# Patient Record
Sex: MALE | Race: BLACK OR AFRICAN AMERICAN | NOT HISPANIC OR LATINO | Employment: FULL TIME | URBAN - METROPOLITAN AREA
[De-identification: names, ages, dates, MRNs, and addresses within clinical notes are randomized per-mention and may not be internally consistent; named-entity substitution may affect disease eponyms.]

---

## 2017-02-23 DIAGNOSIS — B00.9 HERPES: ICD-10-CM

## 2017-02-24 RX ORDER — VALACYCLOVIR HYDROCHLORIDE 500 MG/1
500 TABLET, FILM COATED ORAL DAILY
Qty: 90 TABLET | Refills: 2 | Status: SHIPPED | OUTPATIENT
Start: 2017-02-24 | End: 2018-01-25 | Stop reason: SDUPTHER

## 2017-02-24 RX ORDER — OMEPRAZOLE 20 MG/1
20 CAPSULE, DELAYED RELEASE ORAL NIGHTLY
Qty: 90 CAPSULE | Refills: 0 | Status: SHIPPED | OUTPATIENT
Start: 2017-02-24 | End: 2017-07-10 | Stop reason: SDUPTHER

## 2017-02-24 RX ORDER — MELOXICAM 15 MG/1
15 TABLET ORAL DAILY
Qty: 90 TABLET | Refills: 0 | Status: SHIPPED | OUTPATIENT
Start: 2017-02-24 | End: 2017-07-10 | Stop reason: SDUPTHER

## 2017-03-28 ENCOUNTER — OFFICE VISIT (OUTPATIENT)
Dept: INTERNAL MEDICINE | Facility: CLINIC | Age: 62
End: 2017-03-28

## 2017-03-28 VITALS
BODY MASS INDEX: 26.6 KG/M2 | WEIGHT: 190 LBS | HEART RATE: 85 BPM | DIASTOLIC BLOOD PRESSURE: 90 MMHG | OXYGEN SATURATION: 97 % | SYSTOLIC BLOOD PRESSURE: 128 MMHG | HEIGHT: 71 IN

## 2017-03-28 DIAGNOSIS — Z12.5 PROSTATE CANCER SCREENING: ICD-10-CM

## 2017-03-28 DIAGNOSIS — I10 ESSENTIAL HYPERTENSION: ICD-10-CM

## 2017-03-28 DIAGNOSIS — M19.90 ARTHRITIS: ICD-10-CM

## 2017-03-28 DIAGNOSIS — E08.00 DIABETES MELLITUS DUE TO UNDERLYING CONDITION WITH HYPEROSMOLARITY WITHOUT COMA, WITHOUT LONG-TERM CURRENT USE OF INSULIN (HCC): ICD-10-CM

## 2017-03-28 DIAGNOSIS — Z00.00 ANNUAL PHYSICAL EXAM: Primary | ICD-10-CM

## 2017-03-28 LAB
ALBUMIN SERPL-MCNC: 4.13 G/DL (ref 3.4–4.6)
ALBUMIN UR-MCNC: 20.3 MG/L (ref 1.3–20)
ALBUMIN/GLOB SERPL: 1.3 G/DL
ALP SERPL-CCNC: 57 U/L (ref 46–116)
ALT SERPL W P-5'-P-CCNC: 29 U/L (ref 16–63)
ANION GAP SERPL CALCULATED.3IONS-SCNC: 11 MMOL/L
AST SERPL-CCNC: 27 U/L (ref 7–37)
BASOPHILS # BLD AUTO: 0.03 10*3/MM3 (ref 0–0.2)
BASOPHILS NFR BLD AUTO: 0.5 % (ref 0–1.5)
BILIRUB SERPL-MCNC: 0.7 MG/DL (ref 0.2–1)
BUN BLD-MCNC: 25 MG/DL (ref 6–22)
BUN/CREAT SERPL: 28.4 (ref 7–25)
CALCIUM SPEC-SCNC: 8.5 MG/DL (ref 8.6–10.5)
CHLORIDE SERPL-SCNC: 106 MMOL/L (ref 95–107)
CHOLEST SERPL-MCNC: 170 MG/DL (ref 0–200)
CO2 SERPL-SCNC: 25 MMOL/L (ref 23–32)
CREAT BLD-MCNC: 0.88 MG/DL (ref 0.7–1.3)
CREAT UR-MCNC: 267.1 MG/DL (ref 20–320)
EOSINOPHIL # BLD AUTO: 0.07 10*3/MM3 (ref 0–0.7)
EOSINOPHIL # BLD AUTO: 1.1 % (ref 0.3–6.2)
ERYTHROCYTE [DISTWIDTH] IN BLOOD BY AUTOMATED COUNT: 14 % (ref 11.5–14.5)
GFR SERPL CREATININE-BSD FRML MDRD: 106 ML/MIN/1.73
GLOBULIN UR ELPH-MCNC: 3.3 GM/DL
GLUCOSE BLD-MCNC: 104 MG/DL (ref 70–100)
HBA1C MFR BLD: 5.84 % (ref 4.8–5.6)
HCT VFR BLD AUTO: 42.7 % (ref 40.4–52.2)
HDLC SERPL-MCNC: 56 MG/DL (ref 40–81)
HGB BLD-MCNC: 14.2 G/DL (ref 13.7–17.6)
IMM GRANULOCYTES # BLD: 0 10*3/MM3 (ref 0–0.03)
IMM GRANULOCYTES NFR BLD: 0 % (ref 0–0.5)
LDLC SERPL CALC-MCNC: 107 MG/DL (ref 0–100)
LDLC/HDLC SERPL: 1.92 {RATIO}
LYMPHOCYTES # BLD AUTO: 1.53 10*3/MM3 (ref 0.9–4.8)
LYMPHOCYTES NFR BLD AUTO: 24.1 % (ref 19.6–45.3)
MCH RBC QN AUTO: 30.9 PG (ref 27–32.7)
MCHC RBC AUTO-ENTMCNC: 33.3 G/DL (ref 32.6–36.4)
MCV RBC AUTO: 93 FL (ref 79.8–96.2)
MICROALBUMIN/CREAT UR: 7.6 MG/L (ref 1.3–30)
MONOCYTES # BLD AUTO: 0.42 10*3/MM3 (ref 0.2–1.2)
MONOCYTES NFR BLD AUTO: 6.6 % (ref 5–12)
NEUTROPHILS # BLD AUTO: 4.3 10*3/MM3 (ref 1.9–8.1)
NEUTROPHILS NFR BLD AUTO: 67.7 % (ref 42.7–76)
PLATELET # BLD AUTO: 214 10*3/MM3 (ref 140–500)
POTASSIUM BLD-SCNC: 4.4 MMOL/L (ref 3.3–5.3)
PROT SERPL-MCNC: 7.4 G/DL (ref 6.3–8.4)
PSA SERPL-MCNC: 0.74 NG/ML (ref 0.13–4)
RBC # BLD AUTO: 4.59 10*6/MM3 (ref 4.6–6)
SODIUM BLD-SCNC: 142 MMOL/L (ref 136–145)
TRIGL SERPL-MCNC: 33 MG/DL (ref 0–150)
VLDLC SERPL-MCNC: 6.6 MG/DL
WBC # BLD AUTO: 6.35 10*3/MM3 (ref 4.5–10.7)

## 2017-03-28 PROCEDURE — 82043 UR ALBUMIN QUANTITATIVE: CPT | Performed by: INTERNAL MEDICINE

## 2017-03-28 PROCEDURE — 80053 COMPREHEN METABOLIC PANEL: CPT | Performed by: INTERNAL MEDICINE

## 2017-03-28 PROCEDURE — 99396 PREV VISIT EST AGE 40-64: CPT | Performed by: INTERNAL MEDICINE

## 2017-03-28 PROCEDURE — 84153 ASSAY OF PSA TOTAL: CPT | Performed by: INTERNAL MEDICINE

## 2017-03-28 PROCEDURE — 80061 LIPID PANEL: CPT | Performed by: INTERNAL MEDICINE

## 2017-03-28 PROCEDURE — 82570 ASSAY OF URINE CREATININE: CPT | Performed by: INTERNAL MEDICINE

## 2017-03-28 NOTE — PROGRESS NOTES
Subjective   Bob Biggs is a 62 y.o. male.     HPI Comments: Here for Annual Physical       The following portions of the patient's history were reviewed and updated as appropriate: allergies, current medications, past family history, past medical history, past social history, past surgical history and problem list.    Review of Systems   Constitutional:        Doing well Has [employer] program @ work  Has chandan dieting exercising & haslost 20 lbs   HENT: Negative.    Eyes: Positive for visual disturbance (Wears glasses).        Diabetic eye exam was good   Respiratory: Negative.    Cardiovascular: Negative for chest pain and palpitations.   Gastrointestinal: Negative.    Endocrine:        Diabetes has been doing well   Genitourinary: Negative.    Musculoskeletal: Positive for joint swelling (Has Rt knee replacement Dr Apoorva HARTLEY knee giving him some trouble).   Neurological: Negative.    Hematological: Negative.        Objective   Physical Exam   Constitutional: He is oriented to person, place, and time. He appears well-developed and well-nourished.   HENT:   Head: Normocephalic.   Right Ear: External ear normal.   Left Ear: External ear normal.   Nose: Nose normal.   Mouth/Throat: Oropharynx is clear and moist.   Eyes: Conjunctivae and EOM are normal. Pupils are equal, round, and reactive to light.   Neck: Normal range of motion. Neck supple.   Cardiovascular: Normal rate, regular rhythm, normal heart sounds and intact distal pulses.    Repeat 140/90   Pulmonary/Chest: Effort normal and breath sounds normal.   Abdominal: Soft. Bowel sounds are normal.   Genitourinary: Rectum normal, prostate normal and penis normal.   Musculoskeletal: Normal range of motion.   Neurological: He is alert and oriented to person, place, and time. He has normal reflexes.   Skin: Skin is warm and dry.   Psychiatric: He has a normal mood and affect. His behavior is normal. Thought content normal.   Vitals reviewed.        Assessment/Plan    Bob was seen today for annual exam.    Diagnoses and all orders for this visit:    Annual physical exam    Diabetes mellitus due to underlying condition with hyperosmolarity without coma, without long-term current use of insulin  -     Hemoglobin A1c; Future  -     Microalbumin / Creatinine Urine Ratio; Future  -     Lipid Panel; Future  -     Hemoglobin A1c  -     Microalbumin / Creatinine Urine Ratio  -     Lipid Panel    Essential hypertension  -     CBC Auto Differential; Future  -     Comprehensive Metabolic Panel; Future  -     XR Chest 2 View  -     CBC Auto Differential  -     Comprehensive Metabolic Panel    Arthritis    Prostate cancer screening  -     PSA; Future  -     PSA    Other orders  -     Cancel: ECG 12 Lead    Procedures

## 2017-07-10 RX ORDER — MELOXICAM 15 MG/1
TABLET ORAL
Qty: 90 TABLET | Refills: 1 | Status: SHIPPED | OUTPATIENT
Start: 2017-07-10 | End: 2018-04-05

## 2017-07-10 RX ORDER — OMEPRAZOLE 20 MG/1
CAPSULE, DELAYED RELEASE ORAL
Qty: 90 CAPSULE | Refills: 1 | Status: SHIPPED | OUTPATIENT
Start: 2017-07-10 | End: 2017-09-21 | Stop reason: CLARIF

## 2017-09-12 DIAGNOSIS — R21 RASH: Primary | ICD-10-CM

## 2017-09-12 RX ORDER — BETAMETHASONE DIPROPIONATE 0.5 MG/G
CREAM TOPICAL 2 TIMES DAILY
Qty: 15 G | Refills: 0 | Status: SHIPPED | OUTPATIENT
Start: 2017-09-12 | End: 2018-12-28 | Stop reason: SDUPTHER

## 2017-09-12 NOTE — TELEPHONE ENCOUNTER
"Patient called complaining of a buttock rash x \"few weeks.\"   States he gets this every once in a while, and has just ran out of his cream.  He is requesting a script for  Betamethasone 0.05% cream, 15 g    Last ov as physical 03/28/17  This cream not on his med list and never E RX by you ..  Please approve if OK with you.  Patient:  [phone]    "

## 2017-09-21 ENCOUNTER — OFFICE VISIT (OUTPATIENT)
Dept: INTERNAL MEDICINE | Facility: CLINIC | Age: 62
End: 2017-09-21

## 2017-09-21 VITALS
HEART RATE: 78 BPM | WEIGHT: 204 LBS | BODY MASS INDEX: 28.56 KG/M2 | OXYGEN SATURATION: 98 % | DIASTOLIC BLOOD PRESSURE: 108 MMHG | HEIGHT: 71 IN | SYSTOLIC BLOOD PRESSURE: 160 MMHG

## 2017-09-21 DIAGNOSIS — R10.84 GENERALIZED ABDOMINAL PAIN: Primary | ICD-10-CM

## 2017-09-21 LAB
ALBUMIN SERPL-MCNC: 4.4 G/DL (ref 3.5–5.2)
ALBUMIN/GLOB SERPL: 1.8 G/DL
ALP SERPL-CCNC: 53 U/L (ref 39–117)
ALT SERPL-CCNC: 21 U/L (ref 1–41)
AMYLASE SERPL-CCNC: 73 U/L (ref 28–100)
AST SERPL-CCNC: 20 U/L (ref 1–40)
BASOPHILS # BLD AUTO: 0.04 10*3/MM3 (ref 0–0.2)
BASOPHILS NFR BLD AUTO: 0.5 % (ref 0–1.5)
BILIRUB SERPL-MCNC: 0.4 MG/DL (ref 0.1–1.2)
BUN SERPL-MCNC: 18 MG/DL (ref 8–23)
BUN/CREAT SERPL: 21.4 (ref 7–25)
CALCIUM SERPL-MCNC: 9.2 MG/DL (ref 8.6–10.5)
CHLORIDE SERPL-SCNC: 104 MMOL/L (ref 98–107)
CO2 SERPL-SCNC: 26.5 MMOL/L (ref 22–29)
CREAT SERPL-MCNC: 0.84 MG/DL (ref 0.76–1.27)
EOSINOPHIL # BLD AUTO: 0.31 10*3/MM3 (ref 0–0.7)
EOSINOPHIL # BLD AUTO: 4.2 % (ref 0.3–6.2)
ERYTHROCYTE [DISTWIDTH] IN BLOOD BY AUTOMATED COUNT: 14 % (ref 11.5–14.5)
GLOBULIN SER CALC-MCNC: 2.5 GM/DL
GLUCOSE SERPL-MCNC: 117 MG/DL (ref 65–99)
HCT VFR BLD AUTO: 41.3 % (ref 40.4–52.2)
HGB BLD-MCNC: 13.6 G/DL (ref 13.7–17.6)
IMM GRANULOCYTES # BLD: 0.02 10*3/MM3 (ref 0–0.03)
IMM GRANULOCYTES NFR BLD: 0.3 % (ref 0–0.5)
LIPASE SERPL-CCNC: 27 U/L (ref 13–60)
LYMPHOCYTES # BLD AUTO: 2.58 10*3/MM3 (ref 0.9–4.8)
LYMPHOCYTES NFR BLD AUTO: 35 % (ref 19.6–45.3)
MCH RBC QN AUTO: 30.7 PG (ref 27–32.7)
MCHC RBC AUTO-ENTMCNC: 32.9 G/DL (ref 32.6–36.4)
MCV RBC AUTO: 93.2 FL (ref 79.8–96.2)
MONOCYTES # BLD AUTO: 0.5 10*3/MM3 (ref 0.2–1.2)
MONOCYTES NFR BLD AUTO: 6.8 % (ref 5–12)
NEUTROPHILS # BLD AUTO: 3.93 10*3/MM3 (ref 1.9–8.1)
NEUTROPHILS NFR BLD AUTO: 53.2 % (ref 42.7–76)
PLATELET # BLD AUTO: 208 10*3/MM3 (ref 140–500)
POTASSIUM SERPL-SCNC: 4.2 MMOL/L (ref 3.5–5.2)
PROT SERPL-MCNC: 6.9 G/DL (ref 6–8.5)
RBC # BLD AUTO: 4.43 10*6/MM3 (ref 4.6–6)
SODIUM SERPL-SCNC: 143 MMOL/L (ref 136–145)
WBC # BLD AUTO: 7.38 10*3/MM3 (ref 4.5–10.7)

## 2017-09-21 PROCEDURE — 99214 OFFICE O/P EST MOD 30 MIN: CPT | Performed by: INTERNAL MEDICINE

## 2017-09-21 RX ORDER — PANTOPRAZOLE SODIUM 40 MG/1
40 TABLET, DELAYED RELEASE ORAL 2 TIMES DAILY
Qty: 60 TABLET | Refills: 3 | Status: SHIPPED | OUTPATIENT
Start: 2017-09-21 | End: 2017-11-13 | Stop reason: SDUPTHER

## 2017-09-21 NOTE — PROGRESS NOTES
Subjective   Bob Biggs is a 62 y.o. male.     Abdominal Cramping   This is a new problem. The current episode started 1 to 4 weeks ago.        The following portions of the patient's history were reviewed and updated as appropriate: allergies, current medications, past family history, past medical history, past social history, past surgical history and problem list.    Review of Systems   Constitutional: Unexpected weight change: Not feeling well.   HENT: Negative.    Eyes: Negative.    Respiratory: Negative.    Cardiovascular: Negative.    Gastrointestinal: Positive for abdominal pain (Has been having upper abdominal pain for past 1o days Having a lot of heartburn & reflux JHas alot of gas & belching BM<s OK  No nausea or vomiting  Pain is more generalized).   Genitourinary: Negative.    Musculoskeletal: Negative.    Neurological: Negative.        Objective   Physical Exam   Constitutional: He appears well-developed.   HENT:   Head: Normocephalic.   Eyes: EOM are normal.   Neck: Neck supple.   Cardiovascular: Normal rate, regular rhythm and normal heart sounds.    Repeat 150/90   Pulmonary/Chest: Effort normal and breath sounds normal.   Abdominal: Soft. Bowel sounds are normal. He exhibits no distension. There is tenderness (Mild diffuse tenderness W/O guarding).   Musculoskeletal: Normal range of motion.   Neurological: He is alert.   Vitals reviewed.      Assessment/Plan   Bob was seen today for abdominal cramping.    Diagnoses and all orders for this visit:    Generalized abdominal pain  -     CT Abdomen Pelvis With Contrast; Future  -     CBC Auto Differential; Future  -     Comprehensive Metabolic Panel; Future  -     Amylase; Future  -     Lipase; Future  -     pantoprazole (PROTONIX) 40 MG EC tablet; Take 1 tablet by mouth 2 (Two) Times a Day.  -     CBC Auto Differential  -     Comprehensive Metabolic Panel  -     Amylase  -     Lipase

## 2017-09-22 ENCOUNTER — TELEPHONE (OUTPATIENT)
Dept: INTERNAL MEDICINE | Facility: CLINIC | Age: 62
End: 2017-09-22

## 2017-09-22 NOTE — TELEPHONE ENCOUNTER
Pt calling for a doctors note to excuse him from work 9/22-9/24 with return date 9/25 due to abd pain.    Pt needs this faxed by noon today and wants called when done with a copy left at front for him.     Fax# [phone] [employer]    Pt#[phone]

## 2017-09-28 ENCOUNTER — TELEPHONE (OUTPATIENT)
Dept: INTERNAL MEDICINE | Facility: CLINIC | Age: 62
End: 2017-09-28

## 2017-09-28 DIAGNOSIS — K21.9 GASTROESOPHAGEAL REFLUX DISEASE, ESOPHAGITIS PRESENCE NOT SPECIFIED: Primary | ICD-10-CM

## 2017-10-02 ENCOUNTER — TELEPHONE (OUTPATIENT)
Dept: INTERNAL MEDICINE | Facility: CLINIC | Age: 62
End: 2017-10-02

## 2017-10-02 NOTE — TELEPHONE ENCOUNTER
Please see if he can schedule him with the first available GI in that group and let me know thanks

## 2017-10-02 NOTE — TELEPHONE ENCOUNTER
----- Message from Agustina Mayfield MA sent at 10/2/2017  9:51 AM EDT -----  Pt calling and says he is still having severe reflux and stomach pains.  He called Dr Branch office but can not be seen in consultation until November.  Pt would like to know if our office can get an appt sooner or even with another Gastro.  Pt has missed work and is concerned about his employment        Pt#[phone]

## 2017-10-09 ENCOUNTER — OFFICE (AMBULATORY)
Dept: URBAN - METROPOLITAN AREA CLINIC 75 | Facility: CLINIC | Age: 62
End: 2017-10-09

## 2017-10-09 VITALS
HEIGHT: 70 IN | SYSTOLIC BLOOD PRESSURE: 130 MMHG | WEIGHT: 199 LBS | DIASTOLIC BLOOD PRESSURE: 70 MMHG | HEART RATE: 81 BPM

## 2017-10-09 DIAGNOSIS — R10.13 EPIGASTRIC PAIN: ICD-10-CM

## 2017-10-09 DIAGNOSIS — R13.10 DYSPHAGIA, UNSPECIFIED: ICD-10-CM

## 2017-10-09 DIAGNOSIS — K21.9 GASTRO-ESOPHAGEAL REFLUX DISEASE WITHOUT ESOPHAGITIS: ICD-10-CM

## 2017-10-09 PROCEDURE — 99204 OFFICE O/P NEW MOD 45 MIN: CPT | Performed by: INTERNAL MEDICINE

## 2017-10-10 ENCOUNTER — TELEPHONE (OUTPATIENT)
Dept: INTERNAL MEDICINE | Facility: CLINIC | Age: 62
End: 2017-10-10

## 2017-10-10 NOTE — TELEPHONE ENCOUNTER
----- Message from Isis Mendoza sent at 10/10/2017  9:13 AM EDT -----  Contact: Patient  Patent called regarding FMLA paperwork.  He saw Dr. Mckay Mason yesterday with scope scheduled for Tuesday 10/17/2017.  Needs FMLA papers ASAP; they need to be filled out for 6 weeks with [id number] date of 11/03/2017.  Please call patient if any questions.  Please advise.  Thanks.      Patient:  [phone]

## 2017-10-10 NOTE — TELEPHONE ENCOUNTER
Called pt, informed him that we completed and faxed his FMLA .  would like to f/u with him on 10/31 , made him yeimi for that date at 2:00pm and will discuss his health situation further .

## 2017-10-16 VITALS
HEART RATE: 94 BPM | TEMPERATURE: 97.3 F | OXYGEN SATURATION: 99 % | DIASTOLIC BLOOD PRESSURE: 79 MMHG | HEART RATE: 90 BPM | RESPIRATION RATE: 18 BRPM | SYSTOLIC BLOOD PRESSURE: 128 MMHG | OXYGEN SATURATION: 98 % | TEMPERATURE: 97.2 F | DIASTOLIC BLOOD PRESSURE: 90 MMHG | OXYGEN SATURATION: 96 % | OXYGEN SATURATION: 95 % | SYSTOLIC BLOOD PRESSURE: 151 MMHG | DIASTOLIC BLOOD PRESSURE: 86 MMHG | SYSTOLIC BLOOD PRESSURE: 136 MMHG | WEIGHT: 199 LBS | HEIGHT: 70 IN | SYSTOLIC BLOOD PRESSURE: 131 MMHG | DIASTOLIC BLOOD PRESSURE: 77 MMHG | DIASTOLIC BLOOD PRESSURE: 95 MMHG | RESPIRATION RATE: 20 BRPM | HEART RATE: 81 BPM | OXYGEN SATURATION: 91 % | HEART RATE: 77 BPM | DIASTOLIC BLOOD PRESSURE: 97 MMHG | RESPIRATION RATE: 23 BRPM | OXYGEN SATURATION: 87 % | SYSTOLIC BLOOD PRESSURE: 156 MMHG | HEART RATE: 88 BPM | DIASTOLIC BLOOD PRESSURE: 82 MMHG | OXYGEN SATURATION: 97 % | SYSTOLIC BLOOD PRESSURE: 141 MMHG | HEART RATE: 91 BPM

## 2017-10-17 ENCOUNTER — OFFICE (AMBULATORY)
Dept: URBAN - METROPOLITAN AREA CLINIC 64 | Facility: CLINIC | Age: 62
End: 2017-10-17

## 2017-10-17 ENCOUNTER — AMBULATORY SURGICAL CENTER (AMBULATORY)
Dept: URBAN - METROPOLITAN AREA SURGERY 17 | Facility: SURGERY | Age: 62
End: 2017-10-17

## 2017-10-17 DIAGNOSIS — R10.84 GENERALIZED ABDOMINAL PAIN: ICD-10-CM

## 2017-10-17 DIAGNOSIS — R13.10 DYSPHAGIA, UNSPECIFIED: ICD-10-CM

## 2017-10-17 DIAGNOSIS — K21.9 GASTRO-ESOPHAGEAL REFLUX DISEASE WITHOUT ESOPHAGITIS: ICD-10-CM

## 2017-10-17 DIAGNOSIS — B96.81 HELICOBACTER PYLORI [H. PYLORI] AS THE CAUSE OF DISEASES CLA: ICD-10-CM

## 2017-10-17 DIAGNOSIS — K21.0 GASTRO-ESOPHAGEAL REFLUX DISEASE WITH ESOPHAGITIS: ICD-10-CM

## 2017-10-17 DIAGNOSIS — K29.70 GASTRITIS, UNSPECIFIED, WITHOUT BLEEDING: ICD-10-CM

## 2017-10-17 DIAGNOSIS — K29.50 UNSPECIFIED CHRONIC GASTRITIS WITHOUT BLEEDING: ICD-10-CM

## 2017-10-17 DIAGNOSIS — R10.13 EPIGASTRIC PAIN: ICD-10-CM

## 2017-10-17 LAB
GI HISTOLOGY: A. UNSPECIFIED: (no result)
GI HISTOLOGY: B. UNSPECIFIED: (no result)
GI HISTOLOGY: C. UNSPECIFIED: (no result)
GI HISTOLOGY: D. SELECT: (no result)
GI HISTOLOGY: E. SELECT: (no result)
GI HISTOLOGY: PDF REPORT: (no result)

## 2017-10-17 PROCEDURE — 43239 EGD BIOPSY SINGLE/MULTIPLE: CPT | Performed by: INTERNAL MEDICINE

## 2017-10-17 PROCEDURE — 88305 TISSUE EXAM BY PATHOLOGIST: CPT | Performed by: INTERNAL MEDICINE

## 2017-10-17 PROCEDURE — 43450 DILATE ESOPHAGUS 1/MULT PASS: CPT | Performed by: INTERNAL MEDICINE

## 2017-10-17 RX ADMIN — PROPOFOL 100 MG: 10 INJECTION, EMULSION INTRAVENOUS at 15:20

## 2017-10-17 RX ADMIN — PROPOFOL 25 MG: 10 INJECTION, EMULSION INTRAVENOUS at 15:26

## 2017-10-17 RX ADMIN — PROPOFOL 25 MG: 10 INJECTION, EMULSION INTRAVENOUS at 15:27

## 2017-10-17 RX ADMIN — LIDOCAINE HYDROCHLORIDE 25 MG: 10 INJECTION, SOLUTION EPIDURAL; INFILTRATION; INTRACAUDAL; PERINEURAL at 15:20

## 2017-10-17 RX ADMIN — PROPOFOL 25 MG: 10 INJECTION, EMULSION INTRAVENOUS at 15:23

## 2017-10-17 RX ADMIN — PROPOFOL 50 MG: 10 INJECTION, EMULSION INTRAVENOUS at 15:21

## 2017-10-31 ENCOUNTER — OFFICE VISIT (OUTPATIENT)
Dept: INTERNAL MEDICINE | Facility: CLINIC | Age: 62
End: 2017-10-31

## 2017-10-31 VITALS
HEIGHT: 71 IN | BODY MASS INDEX: 28.56 KG/M2 | WEIGHT: 204 LBS | SYSTOLIC BLOOD PRESSURE: 150 MMHG | DIASTOLIC BLOOD PRESSURE: 98 MMHG

## 2017-10-31 DIAGNOSIS — J06.9 UPPER RESPIRATORY TRACT INFECTION, UNSPECIFIED TYPE: ICD-10-CM

## 2017-10-31 DIAGNOSIS — K29.70 HELICOBACTER PYLORI GASTRITIS: Primary | ICD-10-CM

## 2017-10-31 DIAGNOSIS — I10 ESSENTIAL HYPERTENSION: ICD-10-CM

## 2017-10-31 DIAGNOSIS — B96.81 HELICOBACTER PYLORI GASTRITIS: Primary | ICD-10-CM

## 2017-10-31 PROCEDURE — 99214 OFFICE O/P EST MOD 30 MIN: CPT | Performed by: INTERNAL MEDICINE

## 2017-10-31 RX ORDER — CLARITHROMYCIN 500 MG/1
TABLET, COATED ORAL
Refills: 0 | COMMUNITY
Start: 2017-10-19 | End: 2018-04-05

## 2017-10-31 RX ORDER — METRONIDAZOLE 500 MG/1
TABLET ORAL
Refills: 0 | COMMUNITY
Start: 2017-10-19 | End: 2018-04-05

## 2017-10-31 NOTE — PROGRESS NOTES
Subjective   Bob Biggs is a 62 y.o. male.     Abdominal Pain   This is a recurrent problem.        The following portions of the patient's history were reviewed and updated as appropriate: allergies, current medications, past family history, past medical history, past social history, past surgical history and problem list.    Review of Systems   Constitutional:        Here for F/U   HENT: Positive for rhinorrhea (Having URI SX ).    Respiratory: Positive for cough.    Gastrointestinal: Abdominal pain:  found to have H Pylori gastritis  Finishing RX  Abdominal pain is better.   Genitourinary: Negative.    Musculoskeletal: Negative.        Objective   Physical Exam   Constitutional: He appears well-developed.   HENT:   Head: Normocephalic.   Eyes: EOM are normal.   Cardiovascular: Normal rate, regular rhythm and normal heart sounds.    Repeat 150/100   Pulmonary/Chest: Effort normal and breath sounds normal.   Musculoskeletal: Normal range of motion.       Assessment/Plan   Bob was seen today for abdominal pain.    Diagnoses and all orders for this visit:    Helicobacter pylori gastritis    Essential hypertension    Upper respiratory tract infection, unspecified type    May return to work Friday    Advised OTC RXs for URI

## 2017-11-02 ENCOUNTER — TELEPHONE (OUTPATIENT)
Dept: INTERNAL MEDICINE | Facility: CLINIC | Age: 62
End: 2017-11-02

## 2017-11-02 NOTE — TELEPHONE ENCOUNTER
----- Message from Agustina Mayfield MA sent at 11/2/2017  9:08 AM EDT -----      Pt called and needs a release to work note with no restrictions effect 11/3.  Pt asks to pls fax it to employer-he did not have number but thinks we should have it

## 2017-11-13 ENCOUNTER — TELEPHONE (OUTPATIENT)
Dept: INTERNAL MEDICINE | Facility: CLINIC | Age: 62
End: 2017-11-13

## 2017-11-13 DIAGNOSIS — R10.84 GENERALIZED ABDOMINAL PAIN: ICD-10-CM

## 2017-11-13 RX ORDER — PANTOPRAZOLE SODIUM 40 MG/1
40 TABLET, DELAYED RELEASE ORAL 2 TIMES DAILY
Qty: 180 TABLET | Refills: 3 | Status: SHIPPED | OUTPATIENT
Start: 2017-11-13 | End: 2017-11-20 | Stop reason: SDUPTHER

## 2017-11-13 NOTE — TELEPHONE ENCOUNTER
----- Message from Ashli Brantley sent at 11/13/2017  1:57 PM EST -----  Contact: pt - Dr Mitchell's pt - RE: Rx to mail order pharmacy  Pt calling and would like a refill on Rx to be sent to pt's mail order pharmacy. Could you please send to pt's pharmacy? Please advise. Thanks      pantoprazole (PROTONIX) 40 MG EC tablet 60 tablet 3       Sig - Route: Take 1 tablet by mouth 2 (Two) Times a Day. - Oral    [facility] - [facility] [address] - [phone]  - [phone] FX    Pt # [phone]

## 2017-11-20 ENCOUNTER — TELEPHONE (OUTPATIENT)
Dept: INTERNAL MEDICINE | Facility: CLINIC | Age: 62
End: 2017-11-20

## 2017-11-20 DIAGNOSIS — R10.84 GENERALIZED ABDOMINAL PAIN: ICD-10-CM

## 2017-11-20 RX ORDER — PANTOPRAZOLE SODIUM 40 MG/1
40 TABLET, DELAYED RELEASE ORAL 2 TIMES DAILY
Qty: 14 TABLET | Refills: 0 | Status: SHIPPED | OUTPATIENT
Start: 2017-11-20 | End: 2018-09-20

## 2017-11-20 NOTE — TELEPHONE ENCOUNTER
----- Message from Isis Mendoza sent at 11/20/2017 12:30 PM EST -----  Contact: Patient   Patient called regarding his Rx for pantoprazole.  Mail order is being processed but his medication will not be delivered for a week.  He is requesting a week's supply of this be called into local pharmacy.  Please advise.      pantoprazole (PROTONIX) 40 MG EC tablet    Patient:  [phone]    Pharmacy:  [facility] [address] - [phone] [facility] [phone] FX

## 2017-11-28 ENCOUNTER — TELEPHONE (OUTPATIENT)
Dept: INTERNAL MEDICINE | Facility: CLINIC | Age: 62
End: 2017-11-28

## 2017-11-29 ENCOUNTER — CLINICAL SUPPORT (OUTPATIENT)
Dept: INTERNAL MEDICINE | Facility: CLINIC | Age: 62
End: 2017-11-29

## 2017-11-29 DIAGNOSIS — Z23 NEED FOR VACCINATION: Primary | ICD-10-CM

## 2017-11-29 PROCEDURE — 90686 IIV4 VACC NO PRSV 0.5 ML IM: CPT | Performed by: INTERNAL MEDICINE

## 2017-11-29 PROCEDURE — G0009 ADMIN PNEUMOCOCCAL VACCINE: HCPCS | Performed by: INTERNAL MEDICINE

## 2017-11-29 PROCEDURE — 90670 PCV13 VACCINE IM: CPT | Performed by: INTERNAL MEDICINE

## 2017-11-29 PROCEDURE — G0008 ADMIN INFLUENZA VIRUS VAC: HCPCS | Performed by: INTERNAL MEDICINE

## 2018-01-25 DIAGNOSIS — E08.00 DIABETES MELLITUS DUE TO UNDERLYING CONDITION WITH HYPEROSMOLARITY WITHOUT COMA, WITHOUT LONG-TERM CURRENT USE OF INSULIN (HCC): ICD-10-CM

## 2018-01-25 DIAGNOSIS — B00.9 HERPES: ICD-10-CM

## 2018-01-26 RX ORDER — METFORMIN HYDROCHLORIDE 500 MG/1
TABLET, EXTENDED RELEASE ORAL
Qty: 90 TABLET | Refills: 0 | Status: SHIPPED | OUTPATIENT
Start: 2018-01-26 | End: 2018-04-05 | Stop reason: SDUPTHER

## 2018-01-26 RX ORDER — VALACYCLOVIR HYDROCHLORIDE 500 MG/1
TABLET, FILM COATED ORAL
Qty: 90 TABLET | Refills: 2 | Status: SHIPPED | OUTPATIENT
Start: 2018-01-26 | End: 2018-11-07

## 2018-02-06 ENCOUNTER — TELEPHONE (OUTPATIENT)
Dept: INTERNAL MEDICINE | Facility: CLINIC | Age: 63
End: 2018-02-06

## 2018-02-15 DIAGNOSIS — Z96.651 HX OF TOTAL KNEE ARTHROPLASTY, RIGHT: Primary | ICD-10-CM

## 2018-02-15 RX ORDER — CEPHALEXIN 500 MG/1
CAPSULE ORAL
Qty: 4 CAPSULE | Refills: 0 | Status: SHIPPED | OUTPATIENT
Start: 2018-02-15 | End: 2018-04-05

## 2018-02-15 NOTE — TELEPHONE ENCOUNTER
PATIENT HAVING DENTAL PROCEDURE-02/20/2018    SX 03/03/2016 TKA-RIGHT   [facility] ANTIBIOTIC PROTOCOL-2 YEARS AFTER SURGERY     [facility] OUT OF OFFICE.

## 2018-02-25 DIAGNOSIS — R10.84 GENERALIZED ABDOMINAL PAIN: ICD-10-CM

## 2018-02-26 DIAGNOSIS — R10.84 GENERALIZED ABDOMINAL PAIN: ICD-10-CM

## 2018-02-26 RX ORDER — PANTOPRAZOLE SODIUM 40 MG/1
TABLET, DELAYED RELEASE ORAL
Qty: 60 TABLET | Refills: 0 | Status: SHIPPED | OUTPATIENT
Start: 2018-02-26 | End: 2018-02-26 | Stop reason: SDUPTHER

## 2018-02-26 RX ORDER — PANTOPRAZOLE SODIUM 40 MG/1
TABLET, DELAYED RELEASE ORAL
Qty: 180 TABLET | Refills: 0 | Status: SHIPPED | OUTPATIENT
Start: 2018-02-26 | End: 2018-04-05 | Stop reason: SDUPTHER

## 2018-03-29 ENCOUNTER — TELEPHONE (OUTPATIENT)
Dept: ORTHOPEDIC SURGERY | Facility: CLINIC | Age: 63
End: 2018-03-29

## 2018-04-05 ENCOUNTER — OFFICE VISIT (OUTPATIENT)
Dept: INTERNAL MEDICINE | Facility: CLINIC | Age: 63
End: 2018-04-05

## 2018-04-05 VITALS
DIASTOLIC BLOOD PRESSURE: 100 MMHG | OXYGEN SATURATION: 99 % | BODY MASS INDEX: 28.84 KG/M2 | HEART RATE: 83 BPM | HEIGHT: 71 IN | WEIGHT: 206 LBS | SYSTOLIC BLOOD PRESSURE: 150 MMHG

## 2018-04-05 DIAGNOSIS — I10 ESSENTIAL HYPERTENSION: ICD-10-CM

## 2018-04-05 DIAGNOSIS — K21.00 GASTROESOPHAGEAL REFLUX DISEASE WITH ESOPHAGITIS: ICD-10-CM

## 2018-04-05 DIAGNOSIS — E08.00 DIABETES MELLITUS DUE TO UNDERLYING CONDITION WITH HYPEROSMOLARITY WITHOUT COMA, WITHOUT LONG-TERM CURRENT USE OF INSULIN (HCC): ICD-10-CM

## 2018-04-05 DIAGNOSIS — Z00.00 ANNUAL PHYSICAL EXAM: Primary | ICD-10-CM

## 2018-04-05 DIAGNOSIS — Z12.5 PROSTATE CANCER SCREENING: ICD-10-CM

## 2018-04-05 DIAGNOSIS — E11.9 DIABETES MELLITUS WITHOUT COMPLICATION (HCC): ICD-10-CM

## 2018-04-05 DIAGNOSIS — M19.90 ARTHRITIS: ICD-10-CM

## 2018-04-05 LAB
ALBUMIN SERPL-MCNC: 4.2 G/DL (ref 3.5–5.2)
ALBUMIN UR-MCNC: <1.2 MG/L
ALBUMIN/GLOB SERPL: 1.4 G/DL
ALP SERPL-CCNC: 58 U/L (ref 39–117)
ALT SERPL W P-5'-P-CCNC: 25 U/L (ref 1–41)
ANION GAP SERPL CALCULATED.3IONS-SCNC: 11.9 MMOL/L
AST SERPL-CCNC: 22 U/L (ref 1–40)
BASOPHILS # BLD AUTO: 0.02 10*3/MM3 (ref 0–0.2)
BASOPHILS NFR BLD AUTO: 0.3 % (ref 0–2)
BILIRUB SERPL-MCNC: 0.5 MG/DL (ref 0.1–1.2)
BUN BLD-MCNC: 14 MG/DL (ref 8–23)
BUN/CREAT SERPL: 16.1 (ref 7–25)
CALCIUM SPEC-SCNC: 9.4 MG/DL (ref 8.6–10.5)
CHLORIDE SERPL-SCNC: 101 MMOL/L (ref 98–107)
CHOLEST SERPL-MCNC: 183 MG/DL (ref 0–200)
CO2 SERPL-SCNC: 26.1 MMOL/L (ref 22–29)
CREAT BLD-MCNC: 0.87 MG/DL (ref 0.76–1.27)
CREAT UR-MCNC: 206.8 MG/DL
DEPRECATED RDW RBC AUTO: 45.6 FL (ref 37–54)
EOSINOPHIL # BLD AUTO: 0.22 10*3/MM3 (ref 0–0.7)
EOSINOPHIL NFR BLD AUTO: 3.2 % (ref 0–5)
ERYTHROCYTE [DISTWIDTH] IN BLOOD BY AUTOMATED COUNT: 13.7 % (ref 11.5–15)
GFR SERPL CREATININE-BSD FRML MDRD: 107 ML/MIN/1.73
GLOBULIN UR ELPH-MCNC: 3.1 GM/DL
GLUCOSE BLD-MCNC: 184 MG/DL (ref 65–99)
HBA1C MFR BLD: 7.92 % (ref 4.8–5.6)
HCT VFR BLD AUTO: 41.3 % (ref 40.1–51)
HDLC SERPL-MCNC: 37 MG/DL (ref 40–60)
HGB BLD-MCNC: 13.7 G/DL (ref 13.7–17.5)
LDLC SERPL CALC-MCNC: 126 MG/DL (ref 0–100)
LDLC/HDLC SERPL: 3.41 {RATIO}
LYMPHOCYTES # BLD AUTO: 2.06 10*3/MM3 (ref 0.8–7)
LYMPHOCYTES NFR BLD AUTO: 29.7 % (ref 10–60)
MCH RBC QN AUTO: 30.9 PG (ref 26–34)
MCHC RBC AUTO-ENTMCNC: 33.2 G/DL (ref 31–37)
MCV RBC AUTO: 93 FL (ref 80–100)
MICROALBUMIN/CREAT UR: NORMAL MG/G
MONOCYTES # BLD AUTO: 0.53 10*3/MM3 (ref 0–1)
MONOCYTES NFR BLD AUTO: 7.6 % (ref 0–13)
NEUTROPHILS # BLD AUTO: 4.1 10*3/MM3 (ref 1–11)
NEUTROPHILS NFR BLD AUTO: 59.2 % (ref 30–85)
PLATELET # BLD AUTO: 189 10*3/MM3 (ref 150–450)
PMV BLD AUTO: 11.5 FL (ref 6–12)
POTASSIUM BLD-SCNC: 4.2 MMOL/L (ref 3.5–5.2)
PROT SERPL-MCNC: 7.3 G/DL (ref 6–8.5)
PSA SERPL-MCNC: 1.05 NG/ML (ref 0–4)
RBC # BLD AUTO: 4.44 10*6/MM3 (ref 4.63–6.08)
SODIUM BLD-SCNC: 139 MMOL/L (ref 136–145)
TRIGL SERPL-MCNC: 100 MG/DL (ref 0–150)
VLDLC SERPL-MCNC: 20 MG/DL (ref 5–40)
WBC NRBC COR # BLD: 6.93 10*3/MM3 (ref 5–10)

## 2018-04-05 PROCEDURE — 80053 COMPREHEN METABOLIC PANEL: CPT | Performed by: INTERNAL MEDICINE

## 2018-04-05 PROCEDURE — 85025 COMPLETE CBC W/AUTO DIFF WBC: CPT | Performed by: INTERNAL MEDICINE

## 2018-04-05 PROCEDURE — 80061 LIPID PANEL: CPT | Performed by: INTERNAL MEDICINE

## 2018-04-05 PROCEDURE — 82570 ASSAY OF URINE CREATININE: CPT | Performed by: INTERNAL MEDICINE

## 2018-04-05 PROCEDURE — 36415 COLL VENOUS BLD VENIPUNCTURE: CPT | Performed by: INTERNAL MEDICINE

## 2018-04-05 PROCEDURE — 93000 ELECTROCARDIOGRAM COMPLETE: CPT | Performed by: INTERNAL MEDICINE

## 2018-04-05 PROCEDURE — 82043 UR ALBUMIN QUANTITATIVE: CPT | Performed by: INTERNAL MEDICINE

## 2018-04-05 PROCEDURE — G0103 PSA SCREENING: HCPCS | Performed by: INTERNAL MEDICINE

## 2018-04-05 PROCEDURE — 99396 PREV VISIT EST AGE 40-64: CPT | Performed by: INTERNAL MEDICINE

## 2018-04-05 PROCEDURE — 83036 HEMOGLOBIN GLYCOSYLATED A1C: CPT | Performed by: INTERNAL MEDICINE

## 2018-04-05 RX ORDER — METFORMIN HYDROCHLORIDE 500 MG/1
500 TABLET, EXTENDED RELEASE ORAL
Qty: 90 TABLET | Refills: 3 | Status: SHIPPED | OUTPATIENT
Start: 2018-04-05 | End: 2019-05-08 | Stop reason: SDUPTHER

## 2018-04-05 NOTE — PROGRESS NOTES
Subjective   Bob Biggs is a 63 y.o. male.     Diabetes   He presents for his follow-up diabetic visit. He has type 2 diabetes mellitus. Pertinent negatives for diabetes include no chest pain.        The following portions of the patient's history were reviewed and updated as appropriate: allergies, current medications, past family history, past medical history, past social history, past surgical history and problem list.    Review of Systems   Constitutional:        Has been doing well No new problems Daibetes has been doing well   HENT: Negative.    Eyes: Positive for visual disturbance (Wears glasses ).        No diabetic issues    Respiratory: Negative.    Cardiovascular: Negative.  Negative for chest pain and palpitations.   Gastrointestinal: Positive for abdominal pain (No further bowell problems).   Genitourinary: Negative.    Musculoskeletal: Positive for arthralgias (Usual aches & Pains L knee need replacement).   Neurological: Negative.    Psychiatric/Behavioral: Negative.        Objective   Physical Exam   Constitutional: He is oriented to person, place, and time. He appears well-developed and well-nourished.   HENT:   Head: Normocephalic.   Right Ear: External ear normal.   Left Ear: External ear normal.   Nose: Nose normal.   Mouth/Throat: Oropharynx is clear and moist.   Eyes: Conjunctivae and EOM are normal. Pupils are equal, round, and reactive to light.   Neck: Normal range of motion. Neck supple.   Cardiovascular: Normal rate, regular rhythm, normal heart sounds and intact distal pulses.    Repeat 140/90   Pulmonary/Chest: Effort normal and breath sounds normal.   Abdominal: Soft. Bowel sounds are normal.   Genitourinary: Rectum normal, prostate normal and penis normal.   Musculoskeletal: Normal range of motion.   Rt knee replacement scar   Lymphadenopathy:     He has no cervical adenopathy.   Neurological: He is alert and oriented to person, place, and time. He has normal reflexes.   Skin:  Skin is warm and dry.   Psychiatric: He has a normal mood and affect. His behavior is normal.   Vitals reviewed.      Assessment/Plan   Bob was seen today for annual exam.    Diagnoses and all orders for this visit:    Annual physical exam    Diabetes mellitus without complication  -     Comprehensive Metabolic Panel; Future  -     Lipid Panel; Future  -     Hemoglobin A1c; Future  -     Microalbumin / Creatinine Urine Ratio - Urine, Clean Catch; Future    Essential hypertension  -     CBC Auto Differential; Future  -     Cancel: XR Chest 2 View; Future  -     ECG 12 Lead    Arthritis    Gastroesophageal reflux disease with esophagitis    Prostate cancer screening  -     PSA SCREENING; Future    Diabetes mellitus due to underlying condition with hyperosmolarity without coma, without long-term current use of insulin  -     metFORMIN ER (GLUCOPHAGE-XR) 500 MG 24 hr tablet; Take 1 tablet by mouth Daily With Breakfast.      ECG 12 Lead  Date/Time: 4/5/2018 9:27 AM  Performed by: ALVARO ZEPEDA  Authorized by: ALVARO ZEPEDA   Rhythm: sinus rhythm  Rate: normal  Conduction: conduction normal  ST Segments: ST segments normal  T depression: V4, V5 and V6  Clinical impression: non-specific ECG  Comments: No change from prior tracing

## 2018-07-02 DIAGNOSIS — R10.84 GENERALIZED ABDOMINAL PAIN: ICD-10-CM

## 2018-07-02 RX ORDER — PANTOPRAZOLE SODIUM 40 MG/1
TABLET, DELAYED RELEASE ORAL
Qty: 180 TABLET | Refills: 0 | Status: SHIPPED | OUTPATIENT
Start: 2018-07-02 | End: 2018-09-20

## 2018-07-11 ENCOUNTER — OFFICE VISIT (OUTPATIENT)
Dept: ORTHOPEDIC SURGERY | Facility: CLINIC | Age: 63
End: 2018-07-11

## 2018-07-11 VITALS — BODY MASS INDEX: 27.77 KG/M2 | HEIGHT: 70 IN | WEIGHT: 194 LBS

## 2018-07-11 DIAGNOSIS — M25.562 CHRONIC PAIN OF LEFT KNEE: Primary | ICD-10-CM

## 2018-07-11 DIAGNOSIS — G89.29 CHRONIC PAIN OF LEFT KNEE: Primary | ICD-10-CM

## 2018-07-11 DIAGNOSIS — M17.10 ARTHRITIS OF KNEE: ICD-10-CM

## 2018-07-11 PROCEDURE — 99214 OFFICE O/P EST MOD 30 MIN: CPT | Performed by: ORTHOPAEDIC SURGERY

## 2018-07-11 PROCEDURE — 73562 X-RAY EXAM OF KNEE 3: CPT | Performed by: ORTHOPAEDIC SURGERY

## 2018-07-11 RX ORDER — PREGABALIN 75 MG/1
150 CAPSULE ORAL ONCE
Status: CANCELLED | OUTPATIENT
Start: 2018-10-04 | End: 2018-10-04

## 2018-07-11 RX ORDER — CEFAZOLIN SODIUM 2 G/100ML
2 INJECTION, SOLUTION INTRAVENOUS ONCE
Status: CANCELLED | OUTPATIENT
Start: 2018-10-04 | End: 2018-10-04

## 2018-07-11 RX ORDER — MELOXICAM 15 MG/1
15 TABLET ORAL ONCE
Status: CANCELLED | OUTPATIENT
Start: 2018-10-04 | End: 2018-10-04

## 2018-07-12 NOTE — PROGRESS NOTES
Patient: Bob Biggs    YOB: 1955    Medical Record Number: [id number]    Chief Complaints:  Worsening left knee pain    History of Present Illness:     63 y.o. male patient who presents for follow-up of his left knee.  He reports that his symptoms seem to be getting progressively worse.  He cannot walk more than 100-200 feet without having to rest.  He describes severe constant aching and/or throbbing pain associated with grinding, clicking, and popping.  The last injection did not help significantly.  He comes in today wanting to discuss the option of an arthroplasty.    Allergies:   Allergies   Allergen Reactions   • No Known Drug Allergy        Home Medications:    Current Outpatient Prescriptions:   •  Acetaminophen (ACETAMIN PO), Take 500-1,000 mg by mouth as needed (pain)., Disp: , Rfl:   •  betamethasone dipropionate (DIPROLENE) 0.05 % cream, Apply  topically 2 (Two) Times a Day., Disp: 15 g, Rfl: 0  •  metFORMIN ER (GLUCOPHAGE-XR) 500 MG 24 hr tablet, Take 1 tablet by mouth Daily With Breakfast., Disp: 90 tablet, Rfl: 3  •  Multiple Vitamins-Minerals (CENTRUM SILVER ADULT 50+) tablet, Take  by mouth daily., Disp: , Rfl:   •  ONE TOUCH LANCETS misc, 1 each 2 (Two) Times a Day., Disp: 200 each, Rfl: 1  •  pantoprazole (PROTONIX) 40 MG EC tablet, Take 1 tablet by mouth 2 (Two) Times a Day., Disp: 14 tablet, Rfl: 0  •  pantoprazole (PROTONIX) 40 MG EC tablet, TAKE 1 TABLET BY MOUTH TWICE DAILY, Disp: 180 tablet, Rfl: 0  •  valACYclovir (VALTREX) 500 MG tablet, TAKE 1 TABLET EVERY DAY, Disp: 90 tablet, Rfl: 2  •  Glucos-MSM-C-Fn-Pjbuxi-Sypelh (GLUCOSAMINE MSM COMPLEX) tablet, Take 1,500 mg by mouth every night., Disp: , Rfl:   •  glucose blood test strip, Patient tests two times a day, Disp: 100 each, Rfl: 12    Past Medical History:   Diagnosis Date   • Arthritis    • Diabetes mellitus (CMS/[facility])    • GERD (gastroesophageal reflux disease)    • Hypertension    • Painful swelling of joint    •  "Tear of meniscus of knee        Past Surgical History:   Procedure Laterality Date   • COLONOSCOPY  2008   • HYDROCELECTOMY      late 1990s   • KNEE ARTHROSCOPY Left    • KNEE SURGERY  03/03/2016    Right TKA       Social History     Occupational History   • Not on file.     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   • Sexual activity: Defer      Social History     Social History Narrative   • No narrative on file       Family History   Problem Relation Age of Onset   • Rheumatologic disease Mother    • Heart disease Father    • Cancer Sister    • Diabetes Brother    • Cancer Brother    • No Known Problems Daughter    • No Known Problems Son    • No Known Problems Other    • No Known Problems Daughter        Review of Systems:      Constitutional: Denies fever, shaking or chills   Eyes: Denies change in visual acuity   HEENT: Denies nasal congestion or sore throat   Respiratory: Denies cough or shortness of breath   Cardiovascular: Denies chest pain or edema  Endocrine: Denies tremors, palpitations, intolerance of heat or cold, polyuria, polydipsia.  GI: Denies abdominal pain, nausea, vomiting, bloody stools or diarrhea  : Denies frequency, urgency, incontinence, retention, or nocturia.  Musculoskeletal: Denies numbness tingling or loss of motor function   Integument: Denies rash, lesion or ulceration   Neurologic: Denies headache or focal weakness, deficits  Heme: Denies spontaneous or excessive bleeding, epistaxis, hematuria, melena, fatigue, enlarged or tender lymph nodes.      All other pertinent positives and negatives as noted above in HPI.    Physical Exam: 63 y.o. male  Vitals:    07/11/18 1314   Weight: 88 kg (194 lb)   Height: 177.8 cm (70\")     General:  Patient is awake and alert.  Appears in no acute distress or discomfort.    Psych:  Affect and demeanor are appropriate.    Eyes:  " Conjunctiva and sclera appear grossly normal.  Eyes track well and EOM seem to be intact.    Ears:  No gross abnormalities.  Hearing adequate for the exam.    Cardiovascular:  Regular rate and rhythm.    Lungs:  Good chest expansion.  Breathing unlabored.    Extremities: Left knee is examined.  Moderate medial and patellofemoral tenderness.  Moderate effusion.  Varus alignment.  There is pseudo-laxity of the MCL but the knee is otherwise stable.  Motion is from 3° hyperextension to 100° of flexion.  Good strength with hip flexion, knee extension, ankle and toe plantarflexion dorsal flex compared sensations intact.  Palpable pedal pulses.    Imaging:  Bilateral standing AP views and knees, bilateral merchants and a left knee lateral x-ray are ordered and reviewed.  These are compared to previous x-rays.  He has end stage left knee osteoarthritis with bone-on-bone degeneration, multiple intra-articular loose bodies, varus alignment and significant subchondral sclerosis.    Assessment/Plan:  Left knee osteoarthritis    We discussed options, both surgical and nonsurgical.  He had a good experience with his right knee and wants to pursue an arthroplasty for his left.  The risks, benefits, and alternatives were thoroughly discussed.  He consented.  He will follow-up for a preoperative visit.    Nato Guerin MD    07/11/2018

## 2018-07-19 PROBLEM — M25.562 CHRONIC PAIN OF LEFT KNEE: Status: ACTIVE | Noted: 2018-07-19

## 2018-07-19 PROBLEM — G89.29 CHRONIC PAIN OF LEFT KNEE: Status: ACTIVE | Noted: 2018-07-19

## 2018-09-04 ENCOUNTER — TREATMENT (OUTPATIENT)
Dept: PHYSICAL THERAPY | Facility: CLINIC | Age: 63
End: 2018-09-04

## 2018-09-04 DIAGNOSIS — G89.29 CHRONIC PAIN OF LEFT KNEE: Primary | ICD-10-CM

## 2018-09-04 DIAGNOSIS — M25.562 CHRONIC PAIN OF LEFT KNEE: Primary | ICD-10-CM

## 2018-09-04 PROCEDURE — 97161 PT EVAL LOW COMPLEX 20 MIN: CPT | Performed by: PHYSICAL THERAPIST

## 2018-09-04 PROCEDURE — 97110 THERAPEUTIC EXERCISES: CPT | Performed by: PHYSICAL THERAPIST

## 2018-09-04 PROCEDURE — 97530 THERAPEUTIC ACTIVITIES: CPT | Performed by: PHYSICAL THERAPIST

## 2018-09-04 NOTE — PATIENT INSTRUCTIONS
Educated about Dx and exam findings, rationale and POC. Gave handout on HEP with instructions.  Basic joint protection principles.

## 2018-09-04 NOTE — PROGRESS NOTES
Physical Therapy Initial Evaluation and Plan of Care        Subjective Evaluation    History of Present Illness  Onset date: 2018.  Mechanism of injury: Knee has been hurting for years with recent worsening last few months - Hx of 2 scopes L knee; R TKA 2016      Patient Occupation:  ([employer])   Precautions and Work Restrictions: nonePain  Current pain ratin  At best pain ratin  At worst pain rating: 10  Location: ant left knee  Quality: grinding, burning, dull ache, sharp and throbbing  Relieving factors: rest, medications and ice  Aggravating factors: standing, ambulation, stairs, squatting, sleeping and prolonged positioning  Progression: worsening    Social Support  Lives in: multiple-level home  Lives with: not alone.    Diagnostic Tests  X-ray: abnormal (end stage OA with multiple loose bodies)    Treatments  Previous treatment: injection treatment and medication  Current treatment: medication  Patient Goals  Patient goal: get knee ready for TKA Oct 4           Objective       Tenderness   Left Knee   Tenderness in the medial joint line.     Active Range of Motion   Left Knee   Flexion: 112 degrees     Right Knee   Flexion: 105 degrees   Extensor la degrees     Patellar Mobility   Left Knee Patellar tendons within functional limits include the medial and lateral. Hypomobile in the left superior and left inferior patellar tendon(s).     Strength/Myotome Testing     Left Hip   Planes of Motion   Flexion: 4-  Abduction: 4+  Adduction: 4+  External rotation: 4-  Internal rotation: 4-    Right Hip   Planes of Motion   Flexion: 5  Abduction: 5  Adduction: 5  External rotation: 5  Internal rotation: 4    Left Knee   Flexion: 4-  Extension: 4-    Right Knee   Flexion: 5  Extension: 5    Tests     Additional Tests Details  + Crepitus moderate; mod tight HS, gastroc, ITB    Ambulation     Ambulation: Level Surfaces     Additional Level Surfaces Ambulation Details  Mild antalgia L knee  with dec stance time         Assessment & Plan     Assessment  Impairments: abnormal gait, abnormal or restricted ROM, impaired physical strength and pain with function  Assessment details: 64 yo M with pain from advanced OA and plan of TKA in 1 month presents with limited and painful AROM, khris extension with notable mm tightness, notable weakness knee and hip mm, antalgic gait and limited kelly for normal ADLs  Prognosis: good  Prognosis details: For preparation for successful outcome of planned TKA  Functional Limitations: sleeping, walking, uncomfortable because of pain, sitting, standing and stooping  Goals  Plan Goals: STGs x 2 wks  1. Increasing ROM and strength for improved ADLs  2. Demos kelly for and compliance with PT and HEP  3. Review body mechanics and joint protection principles with ADLs  4. Dec pain with sleep and WB ADLs to < 8/10    LTGs x 4 wks  1. ROM and strength continue to improve  2. Independent with HEP for preparation of TKA  3. Decreased pain (< 6/10) and antalgia with WB ADLs  4.demos understanding of joint protection principles    Plan  Therapy options: will be seen for skilled physical therapy services  Planned modality interventions: cryotherapy  Planned therapy interventions: home exercise program, therapeutic activities, stretching, strengthening, functional ROM exercises, body mechanics training and balance/weight-bearing training  Duration in visits: 12  Treatment plan discussed with: patient        Manual Therapy:    0     mins  78851;  Therapeutic Exercise:    20     mins  67807;     Neuromuscular Cirilo:    0    mins  65435;    Therapeutic Activity:     10     mins  87228;       Timed Treatment:   30   mins   Total Treatment:     57   mins    PT SIGNATURE: Mayuri Mauricio PT   DATE TREATMENT INITIATED: 9/4/2018    Initial Certification  Certification Period: 12/3/2018  I certify that the therapy services are furnished while this patient is under my care.  The services outlined above  are required by this patient, and will be reviewed every 90 days.     PHYSICIAN: Nato Guerin MD      DATE:     Please sign and return via fax to [phone].. Thank you, [facility].

## 2018-09-06 ENCOUNTER — TREATMENT (OUTPATIENT)
Dept: PHYSICAL THERAPY | Facility: CLINIC | Age: 63
End: 2018-09-06

## 2018-09-06 DIAGNOSIS — G89.29 CHRONIC PAIN OF LEFT KNEE: Primary | ICD-10-CM

## 2018-09-06 DIAGNOSIS — M25.562 CHRONIC PAIN OF LEFT KNEE: Primary | ICD-10-CM

## 2018-09-06 PROCEDURE — 97110 THERAPEUTIC EXERCISES: CPT | Performed by: PHYSICAL THERAPIST

## 2018-09-06 PROCEDURE — 97530 THERAPEUTIC ACTIVITIES: CPT | Performed by: PHYSICAL THERAPIST

## 2018-09-06 NOTE — PROGRESS NOTES
Physical Therapy Daily Progress Note            Subjective   Not able to do any ex as was on vacation.  Knee feels the same.    Objective   See Exercise, Manual, and Modality Logs for complete treatment.       Assessment/Plan    Able to progress ex without increased c/o pain but fatigued easily with SLR.  Good squat mechanics after instruction with report of improved tolerance vs how he normally does it.      Progress flexibility and strength ex as kelly to prepare for TKA             Manual Therapy:    0     mins  79099;  Therapeutic Exercise:    26    mins  52865;     Neuromuscular Cirilo:    0    mins  67417;    Therapeutic Activity:     8     mins  34650;       Timed Treatment:   34   mins   Total Treatment:     44   mins    Mayuri Mauricio, ERIC  Physical Therapist

## 2018-09-10 ENCOUNTER — TREATMENT (OUTPATIENT)
Dept: PHYSICAL THERAPY | Facility: CLINIC | Age: 63
End: 2018-09-10

## 2018-09-10 DIAGNOSIS — G89.29 CHRONIC PAIN OF LEFT KNEE: Primary | ICD-10-CM

## 2018-09-10 DIAGNOSIS — M25.562 CHRONIC PAIN OF LEFT KNEE: Primary | ICD-10-CM

## 2018-09-10 PROCEDURE — 97530 THERAPEUTIC ACTIVITIES: CPT | Performed by: PHYSICAL THERAPIST

## 2018-09-10 PROCEDURE — 97110 THERAPEUTIC EXERCISES: CPT | Performed by: PHYSICAL THERAPIST

## 2018-09-10 NOTE — PROGRESS NOTES
Physical Therapy Daily Progress Note    Visit # : 3  Bob Biggs reports: exercises are going well.  Having a lot of knee pain at work due to prolonged standing/walking.    Subjective     Objective   See Exercise, Manual, and Modality Logs for complete treatment.       Assessment/Plan  TKE remains limited secondary to end stage OA.  Pt reported pain with quad sets.  Add lateral band walks next visit.   Progress strengthening /stabilization /functional activity           Manual Therapy:    -     mins  69984;  Therapeutic Exercise:    28     mins  81401;     Neuromuscular Cirilo:    -    mins  42509;    Therapeutic Activity:     8     mins  44928;     Gait Training:      -     mins  02557;     Ultrasound:     -     mins  60360;    Electrical Stimulation:    -     mins  58055 ( );  Iontophoresis                 -     mins 39336      Timed Treatment:   36   mins   Total Treatment:     46   mins        Criss Hansen PT  Physical Therapist  [place] License # [id number]

## 2018-09-10 NOTE — PATIENT INSTRUCTIONS
Access Code: [email or web address]   URL: [email or web address]   Date: 09/10/2018   Prepared by: Henna Hansen     Exercises   Standing Terminal Knee Extension with Resistance - 15 reps - 1 sets - 5 hold - 1x daily     Issued blue TB for HEP

## 2018-09-18 ENCOUNTER — TREATMENT (OUTPATIENT)
Dept: PHYSICAL THERAPY | Facility: CLINIC | Age: 63
End: 2018-09-18

## 2018-09-18 DIAGNOSIS — M25.562 CHRONIC PAIN OF LEFT KNEE: Primary | ICD-10-CM

## 2018-09-18 DIAGNOSIS — G89.29 CHRONIC PAIN OF LEFT KNEE: Primary | ICD-10-CM

## 2018-09-18 PROCEDURE — 97110 THERAPEUTIC EXERCISES: CPT | Performed by: PHYSICAL THERAPIST

## 2018-09-18 NOTE — PROGRESS NOTES
Physical Therapy Daily Progress Note            Subjective   Feeling a little stronger but still the same pain    Objective   See Exercise, Manual, and Modality Logs for complete treatment.       Assessment/Plan    Slow, gradual progression of ex due to limited tolerance but patient reporting improvement in strength    Continue to progress ex and functional activities as kelly             Manual Therapy:    0     mins  35350;  Therapeutic Exercise:    32     mins  80919;       Timed Treatment:   32   mins   Total Treatment:     42   mins    Mayuri Mauricio PT  Physical Therapist

## 2018-09-20 ENCOUNTER — APPOINTMENT (OUTPATIENT)
Dept: PREADMISSION TESTING | Facility: HOSPITAL | Age: 63
End: 2018-09-20

## 2018-09-20 VITALS
HEART RATE: 68 BPM | DIASTOLIC BLOOD PRESSURE: 82 MMHG | WEIGHT: 197 LBS | BODY MASS INDEX: 28.2 KG/M2 | SYSTOLIC BLOOD PRESSURE: 130 MMHG | RESPIRATION RATE: 16 BRPM | TEMPERATURE: 98.2 F | OXYGEN SATURATION: 98 % | HEIGHT: 70 IN

## 2018-09-20 DIAGNOSIS — M25.562 CHRONIC PAIN OF LEFT KNEE: ICD-10-CM

## 2018-09-20 DIAGNOSIS — G89.29 CHRONIC PAIN OF LEFT KNEE: ICD-10-CM

## 2018-09-20 LAB
ABO GROUP BLD: NORMAL
ANION GAP SERPL CALCULATED.3IONS-SCNC: 10.2 MMOL/L
BILIRUB UR QL STRIP: NEGATIVE
BLD GP AB SCN SERPL QL: NEGATIVE
BUN BLD-MCNC: 17 MG/DL (ref 8–23)
BUN/CREAT SERPL: 18.7 (ref 7–25)
CALCIUM SPEC-SCNC: 9.3 MG/DL (ref 8.6–10.5)
CHLORIDE SERPL-SCNC: 103 MMOL/L (ref 98–107)
CLARITY UR: CLEAR
CO2 SERPL-SCNC: 26.8 MMOL/L (ref 22–29)
COLOR UR: YELLOW
CREAT BLD-MCNC: 0.91 MG/DL (ref 0.76–1.27)
DEPRECATED RDW RBC AUTO: 47.3 FL (ref 37–54)
ERYTHROCYTE [DISTWIDTH] IN BLOOD BY AUTOMATED COUNT: 14 % (ref 11.5–14.5)
GFR SERPL CREATININE-BSD FRML MDRD: 102 ML/MIN/1.73
GLUCOSE BLD-MCNC: 147 MG/DL (ref 65–99)
GLUCOSE UR STRIP-MCNC: NEGATIVE MG/DL
HCT VFR BLD AUTO: 42.1 % (ref 40.4–52.2)
HGB BLD-MCNC: 13.8 G/DL (ref 13.7–17.6)
HGB UR QL STRIP.AUTO: NEGATIVE
KETONES UR QL STRIP: NEGATIVE
LEUKOCYTE ESTERASE UR QL STRIP.AUTO: NEGATIVE
MCH RBC QN AUTO: 30.2 PG (ref 27–32.7)
MCHC RBC AUTO-ENTMCNC: 32.8 G/DL (ref 32.6–36.4)
MCV RBC AUTO: 92.1 FL (ref 79.8–96.2)
NITRITE UR QL STRIP: NEGATIVE
PH UR STRIP.AUTO: 8 [PH] (ref 5–8)
PLATELET # BLD AUTO: 218 10*3/MM3 (ref 140–500)
PMV BLD AUTO: 11.1 FL (ref 6–12)
POTASSIUM BLD-SCNC: 4.5 MMOL/L (ref 3.5–5.2)
PROT UR QL STRIP: NEGATIVE
RBC # BLD AUTO: 4.57 10*6/MM3 (ref 4.6–6)
RH BLD: POSITIVE
SODIUM BLD-SCNC: 140 MMOL/L (ref 136–145)
SP GR UR STRIP: 1.02 (ref 1–1.03)
T&S EXPIRATION DATE: NORMAL
UROBILINOGEN UR QL STRIP: NORMAL
WBC NRBC COR # BLD: 7.15 10*3/MM3 (ref 4.5–10.7)

## 2018-09-20 PROCEDURE — 85027 COMPLETE CBC AUTOMATED: CPT | Performed by: ORTHOPAEDIC SURGERY

## 2018-09-20 PROCEDURE — 86900 BLOOD TYPING SEROLOGIC ABO: CPT | Performed by: ORTHOPAEDIC SURGERY

## 2018-09-20 PROCEDURE — 86850 RBC ANTIBODY SCREEN: CPT | Performed by: ORTHOPAEDIC SURGERY

## 2018-09-20 PROCEDURE — 86901 BLOOD TYPING SEROLOGIC RH(D): CPT | Performed by: ORTHOPAEDIC SURGERY

## 2018-09-20 PROCEDURE — 36415 COLL VENOUS BLD VENIPUNCTURE: CPT

## 2018-09-20 PROCEDURE — 80048 BASIC METABOLIC PNL TOTAL CA: CPT | Performed by: ORTHOPAEDIC SURGERY

## 2018-09-20 PROCEDURE — 81003 URINALYSIS AUTO W/O SCOPE: CPT | Performed by: ORTHOPAEDIC SURGERY

## 2018-09-20 RX ORDER — GUAIFENESIN 600 MG/1
1200 TABLET, EXTENDED RELEASE ORAL AS NEEDED
COMMUNITY

## 2018-09-20 RX ORDER — CETIRIZINE HYDROCHLORIDE 10 MG/1
10 TABLET ORAL DAILY
COMMUNITY

## 2018-09-20 RX ORDER — PANTOPRAZOLE SODIUM 40 MG/1
40 TABLET, DELAYED RELEASE ORAL 2 TIMES DAILY
COMMUNITY
End: 2020-01-16

## 2018-09-20 RX ORDER — SENNOSIDES 8.6 MG
650 CAPSULE ORAL EVERY 8 HOURS PRN
COMMUNITY

## 2018-09-20 ASSESSMENT — KOOS JR
KOOS JR SCORE: 39.625
KOOS JR SCORE: 19

## 2018-09-20 NOTE — DISCHARGE INSTRUCTIONS
Take the following medications the morning of surgery with a small sip of water:  PANTOPRAZOLE    PLEASE ARRIVE AT THE HOSPITAL AT 6 AM ON October 4, 2018    General Instructions:  • Do not eat solid food after midnight the night before surgery.  • You may drink clear liquids day of surgery but must stop at least one hour before your hospital arrival time.  • It is beneficial for you to have a clear drink that contains carbohydrates the day of surgery.  We suggest a 12 to 20 ounce bottle of Gatorade or Powerade for non-diabetic patients or a 12 to 20 ounce bottle of G2 or Powerade Zero for diabetic patients. (Pediatric patients, are not advised to drink a 12 to 20 ounce carbohydrate drink)    Clear liquids are liquids you can see through.  Nothing red in color.     Plain water                               Sports drinks  Sodas                                   Gelatin (Jell-O)  Fruit juices without pulp such as white grape juice and apple juice  Popsicles that contain no fruit or yogurt  Tea or coffee (no cream or milk added)  Gatorade / Powerade  G2 / Powerade Zero    • Infants may have breast milk up to four hours before surgery.  • Infants drinking formula may drink formula up to six hours before surgery.   • Patients who avoid smoking, chewing tobacco and alcohol for 4 weeks prior to surgery have a reduced risk of post-operative complications.  Quit smoking as many days before surgery as you can.  • Do not smoke, use chewing tobacco or drink alcohol the day of surgery.   • If applicable bring your C-PAP/ BI-PAP machine.  • Bring any papers given to you in the doctor’s office.  • Wear clean comfortable clothes and socks.  • Do not wear contact lenses or make-up.  Bring a case for your glasses.   • Bring crutches or walker if applicable.  • Remove all piercings.  Leave jewelry and any other valuables at home.  • Hair extensions with metal clips must be removed prior to surgery.  • The Pre-Admission Testing nurse  will instruct you to bring medications if unable to obtain an accurate list in Pre-Admission Testing.        If you were given a blood bank ID arm band remember to bring it with you the day of surgery.    Preventing a Surgical Site Infection:  • For 2 to 3 days before surgery, avoid shaving with a razor because the razor can irritate skin and make it easier to develop an infection.    • Any areas of open skin can increase the risk of a post-operative wound infection by allowing bacteria to enter and travel throughout the body.  Notify your surgeon if you have any skin wounds / rashes even if it is not near the expected surgical site.  The area will need assessed to determine if surgery should be delayed until it is healed.  • The night prior to surgery sleep in a clean bed with clean clothing.  Do not allow pets to sleep with you.  • Shower on the morning of surgery using a fresh bar of anti-bacterial soap (such as Dial) and clean washcloth.  Dry with a clean towel and dress in clean clothing.  • Ask your surgeon if you will be receiving antibiotics prior to surgery.  • Make sure you, your family, and all healthcare providers clean their hands with soap and water or an alcohol based hand  before caring for you or your wound.    Day of surgery:  Upon arrival, a Pre-op nurse and Anesthesiologist will review your health history, obtain vital signs, and answer questions you may have.  The only belongings needed at this time will be your home medications and if applicable your C-PAP/BI-PAP machine.  If you are staying overnight your family can leave the rest of your belongings in the car and bring them to your room later.  A Pre-op nurse will start an IV and you may receive medication in preparation for surgery, including something to help you relax.  Your family will be able to see you in the Pre-op area.  While you are in surgery your family should notify the waiting room  if they leave the waiting  room area and provide a contact phone number.    2% CHLORAHEXIDINE GLUCONATE* CLOTH  Preparing or “prepping” skin before surgery can reduce the risk of infection at the surgical site. To make the process easier, [facility] has chosen disposable cloths moistened with a rinse-free, 2% Chlorhexidine Gluconate (CHG) antiseptic solution. The steps below outline the prepping process and should be carefully followed.        Use the prep cloth on the area that is circled in the diagram             Directions Night before Surgery  1) Shower using a fresh bar of anti-bacterial soap (such as Dial) and clean washcloth.  Use a clean towel to completely dry your skin.  2) Do not use any lotions, oils or creams on your skin.  3) Open the package and remove 1 cloth, wipe your skin for 30 seconds in a circular motion.  Allow to dry for 3 minutes.  4) Repeat #3 with second cloth.  5) Do not touch your eyes, ears, or mouth with the prep cloth.  6) Allow the wet prep solution to air dry.  7) Discard the prep cloth and wash your hands with soap and water.   8) Dress in clean bed clothes and sleep on fresh clean bed sheets.   9) You may experience some temporary itching after the prep.    Directions Day of Surgery  1) Repeat steps 1,2,3,4,5,6,7, and 9.   2) Dress in clean clothes before coming to the hospital.    BACTROBAN NASAL OINTMENT  There are many germs normally in your nose. Bactroban is an ointment that will help reduce these germs. Please follow these instructions for Bactroban use:      ____The day before surgery in the morning  Date________    ____The day before surgery in the evening              Date________    ____The day of surgery in the morning    Date________    **Squirt ½ package of Bactroban Ointment onto a cotton applicator and apply to inside of 1st nostril.  Squirt the remaining Bactroban and apply to the inside of the other nostril.    Please be aware that surgery does come with discomfort.  We want  to make every effort to control your discomfort so please discuss any uncontrolled symptoms with your nurse.   Your doctor will most likely have prescribed pain medications.      If you are going home after surgery you will receive individualized written care instructions before being discharged.  A responsible adult must drive you to and from the hospital on the day of your surgery and stay with you for 24 hours.    If you are staying overnight following surgery, you will be transported to your hospital room following the recovery period.  [facility] has all private rooms.    You have received a list of surgical assistants for your reference.  If you have any questions please call Pre-Admission Testing at [phone].  Deductibles and co-payments are collected on the day of service. Please be prepared to pay the required co-pay, deductible or deposit on the day of service as defined by your plan.

## 2018-09-22 ENCOUNTER — HOSPITAL ENCOUNTER (EMERGENCY)
Facility: HOSPITAL | Age: 63
Discharge: HOME | End: 2018-09-22
Attending: EMERGENCY MEDICINE | Admitting: EMERGENCY MEDICINE

## 2018-09-22 ENCOUNTER — APPOINTMENT (OUTPATIENT)
Dept: GENERAL RADIOLOGY | Facility: HOSPITAL | Age: 63
End: 2018-09-22

## 2018-09-22 VITALS
SYSTOLIC BLOOD PRESSURE: 170 MMHG | DIASTOLIC BLOOD PRESSURE: 93 MMHG | RESPIRATION RATE: 18 BRPM | OXYGEN SATURATION: 98 % | BODY MASS INDEX: 28.2 KG/M2 | TEMPERATURE: 97.7 F | WEIGHT: 197 LBS | HEART RATE: 108 BPM | HEIGHT: 70 IN

## 2018-09-22 DIAGNOSIS — S61.217A LACERATION OF LEFT LITTLE FINGER WITHOUT FOREIGN BODY WITHOUT DAMAGE TO NAIL, INITIAL ENCOUNTER: Primary | ICD-10-CM

## 2018-09-22 DIAGNOSIS — S62.627B OPEN DISPLACED FRACTURE OF MIDDLE PHALANX OF LEFT LITTLE FINGER, INITIAL ENCOUNTER: ICD-10-CM

## 2018-09-22 LAB
AMPHET+METHAMPHET UR QL: NEGATIVE
BARBITURATES UR QL SCN: NEGATIVE
BENZODIAZ UR QL SCN: NEGATIVE
CANNABINOIDS SERPL QL: NEGATIVE
COCAINE UR QL: NEGATIVE
METHADONE UR QL SCN: NEGATIVE
OPIATES UR QL: NEGATIVE
OXYCODONE UR QL SCN: NEGATIVE

## 2018-09-22 PROCEDURE — 80307 DRUG TEST PRSMV CHEM ANLYZR: CPT | Performed by: PHYSICIAN ASSISTANT

## 2018-09-22 PROCEDURE — 99283 EMERGENCY DEPT VISIT LOW MDM: CPT

## 2018-09-22 PROCEDURE — 73140 X-RAY EXAM OF FINGER(S): CPT

## 2018-09-22 RX ORDER — LIDOCAINE HYDROCHLORIDE 10 MG/ML
5 INJECTION, SOLUTION EPIDURAL; INFILTRATION; INTRACAUDAL; PERINEURAL ONCE
Status: COMPLETED | OUTPATIENT
Start: 2018-09-22 | End: 2018-09-22

## 2018-09-22 RX ORDER — CEPHALEXIN 500 MG/1
500 CAPSULE ORAL 3 TIMES DAILY
Qty: 30 CAPSULE | Refills: 0 | Status: SHIPPED | OUTPATIENT
Start: 2018-09-22 | End: 2018-10-02

## 2018-09-22 RX ADMIN — LIDOCAINE HYDROCHLORIDE 5 ML: 10 INJECTION, SOLUTION EPIDURAL; INFILTRATION; INTRACAUDAL; PERINEURAL at 19:38

## 2018-09-24 ENCOUNTER — OFFICE VISIT (OUTPATIENT)
Dept: ORTHOPEDIC SURGERY | Facility: CLINIC | Age: 63
End: 2018-09-24

## 2018-09-24 VITALS — TEMPERATURE: 99.6 F | HEIGHT: 70 IN | WEIGHT: 199.4 LBS | BODY MASS INDEX: 28.55 KG/M2

## 2018-09-24 DIAGNOSIS — Z01.818 PRE-OP EVALUATION: Primary | ICD-10-CM

## 2018-09-24 DIAGNOSIS — R10.84 GENERALIZED ABDOMINAL PAIN: ICD-10-CM

## 2018-09-24 PROCEDURE — S0260 H&P FOR SURGERY: HCPCS | Performed by: ORTHOPAEDIC SURGERY

## 2018-09-24 RX ORDER — PANTOPRAZOLE SODIUM 40 MG/1
TABLET, DELAYED RELEASE ORAL
Qty: 180 TABLET | Refills: 1 | Status: SHIPPED | OUTPATIENT
Start: 2018-09-24 | End: 2018-11-07

## 2018-09-25 ENCOUNTER — APPOINTMENT (OUTPATIENT)
Dept: GENERAL RADIOLOGY | Facility: HOSPITAL | Age: 63
End: 2018-09-25

## 2018-09-25 ENCOUNTER — ANESTHESIA (OUTPATIENT)
Dept: PERIOP | Facility: HOSPITAL | Age: 63
End: 2018-09-25

## 2018-09-25 ENCOUNTER — ANESTHESIA EVENT (OUTPATIENT)
Dept: PERIOP | Facility: HOSPITAL | Age: 63
End: 2018-09-25

## 2018-09-25 ENCOUNTER — HOSPITAL ENCOUNTER (OUTPATIENT)
Facility: HOSPITAL | Age: 63
Discharge: HOME | End: 2018-09-25
Attending: SURGERY | Admitting: SURGERY

## 2018-09-25 VITALS
HEART RATE: 62 BPM | WEIGHT: 195.7 LBS | SYSTOLIC BLOOD PRESSURE: 155 MMHG | HEIGHT: 70 IN | DIASTOLIC BLOOD PRESSURE: 90 MMHG | RESPIRATION RATE: 18 BRPM | TEMPERATURE: 98.4 F | BODY MASS INDEX: 28.02 KG/M2 | OXYGEN SATURATION: 96 %

## 2018-09-25 DIAGNOSIS — M25.562 CHRONIC PAIN OF LEFT KNEE: ICD-10-CM

## 2018-09-25 DIAGNOSIS — G89.29 CHRONIC PAIN OF LEFT KNEE: ICD-10-CM

## 2018-09-25 LAB — GLUCOSE BLDC GLUCOMTR-MCNC: 87 MG/DL (ref 70–130)

## 2018-09-25 PROCEDURE — 82962 GLUCOSE BLOOD TEST: CPT

## 2018-09-25 PROCEDURE — 73140 X-RAY EXAM OF FINGER(S): CPT

## 2018-09-25 PROCEDURE — 25010000002 FENTANYL CITRATE (PF) 100 MCG/2ML SOLUTION: Performed by: NURSE ANESTHETIST, CERTIFIED REGISTERED

## 2018-09-25 PROCEDURE — 25010000002 FENTANYL CITRATE (PF) 100 MCG/2ML SOLUTION: Performed by: ANESTHESIOLOGY

## 2018-09-25 PROCEDURE — 25010000002 MIDAZOLAM PER 1 MG: Performed by: ANESTHESIOLOGY

## 2018-09-25 PROCEDURE — 76000 FLUOROSCOPY <1 HR PHYS/QHP: CPT

## 2018-09-25 PROCEDURE — G0378 HOSPITAL OBSERVATION PER HR: HCPCS

## 2018-09-25 PROCEDURE — 25010000003 MEPIVACAINE PER 10 ML: Performed by: ANESTHESIOLOGY

## 2018-09-25 PROCEDURE — 25010000002 ROPIVACAINE PER 1 MG: Performed by: ANESTHESIOLOGY

## 2018-09-25 PROCEDURE — 25010000002 ONDANSETRON PER 1 MG: Performed by: NURSE ANESTHETIST, CERTIFIED REGISTERED

## 2018-09-25 PROCEDURE — 25010000002 PROPOFOL 10 MG/ML EMULSION: Performed by: NURSE ANESTHETIST, CERTIFIED REGISTERED

## 2018-09-25 RX ORDER — PREGABALIN 75 MG/1
150 CAPSULE ORAL ONCE
Status: DISCONTINUED | OUTPATIENT
Start: 2018-09-25 | End: 2018-09-25 | Stop reason: HOSPADM

## 2018-09-25 RX ORDER — SODIUM CHLORIDE, SODIUM LACTATE, POTASSIUM CHLORIDE, CALCIUM CHLORIDE 600; 310; 30; 20 MG/100ML; MG/100ML; MG/100ML; MG/100ML
9 INJECTION, SOLUTION INTRAVENOUS CONTINUOUS
Status: DISCONTINUED | OUTPATIENT
Start: 2018-09-25 | End: 2018-09-25 | Stop reason: HOSPADM

## 2018-09-25 RX ORDER — PROPOFOL 10 MG/ML
VIAL (ML) INTRAVENOUS AS NEEDED
Status: DISCONTINUED | OUTPATIENT
Start: 2018-09-25 | End: 2018-09-25 | Stop reason: SURG

## 2018-09-25 RX ORDER — MELOXICAM 15 MG/1
15 TABLET ORAL ONCE
Status: DISCONTINUED | OUTPATIENT
Start: 2018-09-25 | End: 2018-09-25 | Stop reason: HOSPADM

## 2018-09-25 RX ORDER — LIDOCAINE HYDROCHLORIDE 10 MG/ML
0.5 INJECTION, SOLUTION EPIDURAL; INFILTRATION; INTRACAUDAL; PERINEURAL ONCE AS NEEDED
Status: DISCONTINUED | OUTPATIENT
Start: 2018-09-25 | End: 2018-09-25 | Stop reason: HOSPADM

## 2018-09-25 RX ORDER — ROPIVACAINE HYDROCHLORIDE 5 MG/ML
INJECTION, SOLUTION EPIDURAL; INFILTRATION; PERINEURAL AS NEEDED
Status: DISCONTINUED | OUTPATIENT
Start: 2018-09-25 | End: 2018-09-25 | Stop reason: SURG

## 2018-09-25 RX ORDER — LIDOCAINE HYDROCHLORIDE 20 MG/ML
INJECTION, SOLUTION INFILTRATION; PERINEURAL AS NEEDED
Status: DISCONTINUED | OUTPATIENT
Start: 2018-09-25 | End: 2018-09-25 | Stop reason: SURG

## 2018-09-25 RX ORDER — CEFAZOLIN SODIUM 2 G/100ML
2 INJECTION, SOLUTION INTRAVENOUS
Status: DISCONTINUED | OUTPATIENT
Start: 2018-09-25 | End: 2018-09-25 | Stop reason: HOSPADM

## 2018-09-25 RX ORDER — ONDANSETRON 2 MG/ML
INJECTION INTRAMUSCULAR; INTRAVENOUS AS NEEDED
Status: DISCONTINUED | OUTPATIENT
Start: 2018-09-25 | End: 2018-09-25 | Stop reason: SURG

## 2018-09-25 RX ORDER — MIDAZOLAM HYDROCHLORIDE 1 MG/ML
2 INJECTION INTRAMUSCULAR; INTRAVENOUS
Status: DISCONTINUED | OUTPATIENT
Start: 2018-09-25 | End: 2018-09-25 | Stop reason: HOSPADM

## 2018-09-25 RX ORDER — HYDROCODONE BITARTRATE AND ACETAMINOPHEN 7.5; 325 MG/1; MG/1
1 TABLET ORAL ONCE AS NEEDED
Status: DISCONTINUED | OUTPATIENT
Start: 2018-09-25 | End: 2018-09-25 | Stop reason: HOSPADM

## 2018-09-25 RX ORDER — SODIUM CHLORIDE 0.9 % (FLUSH) 0.9 %
1-10 SYRINGE (ML) INJECTION AS NEEDED
Status: DISCONTINUED | OUTPATIENT
Start: 2018-09-25 | End: 2018-09-25 | Stop reason: HOSPADM

## 2018-09-25 RX ORDER — DIPHENHYDRAMINE HYDROCHLORIDE 50 MG/ML
12.5 INJECTION INTRAMUSCULAR; INTRAVENOUS
Status: DISCONTINUED | OUTPATIENT
Start: 2018-09-25 | End: 2018-09-25 | Stop reason: HOSPADM

## 2018-09-25 RX ORDER — NALOXONE HCL 0.4 MG/ML
0.2 VIAL (ML) INJECTION AS NEEDED
Status: DISCONTINUED | OUTPATIENT
Start: 2018-09-25 | End: 2018-09-25 | Stop reason: HOSPADM

## 2018-09-25 RX ORDER — VALACYCLOVIR HYDROCHLORIDE 500 MG/1
500 TABLET, FILM COATED ORAL NIGHTLY
COMMUNITY
End: 2019-08-08 | Stop reason: SDUPTHER

## 2018-09-25 RX ORDER — MAGNESIUM HYDROXIDE 1200 MG/15ML
LIQUID ORAL AS NEEDED
Status: DISCONTINUED | OUTPATIENT
Start: 2018-09-25 | End: 2018-09-25 | Stop reason: HOSPADM

## 2018-09-25 RX ORDER — PROMETHAZINE HYDROCHLORIDE 25 MG/ML
12.5 INJECTION, SOLUTION INTRAMUSCULAR; INTRAVENOUS ONCE AS NEEDED
Status: DISCONTINUED | OUTPATIENT
Start: 2018-09-25 | End: 2018-09-25 | Stop reason: HOSPADM

## 2018-09-25 RX ORDER — PROMETHAZINE HYDROCHLORIDE 25 MG/1
25 TABLET ORAL ONCE AS NEEDED
Status: DISCONTINUED | OUTPATIENT
Start: 2018-09-25 | End: 2018-09-25 | Stop reason: HOSPADM

## 2018-09-25 RX ORDER — ONDANSETRON 2 MG/ML
4 INJECTION INTRAMUSCULAR; INTRAVENOUS ONCE AS NEEDED
Status: DISCONTINUED | OUTPATIENT
Start: 2018-09-25 | End: 2018-09-25 | Stop reason: HOSPADM

## 2018-09-25 RX ORDER — OXYCODONE AND ACETAMINOPHEN 7.5; 325 MG/1; MG/1
1 TABLET ORAL ONCE AS NEEDED
Status: DISCONTINUED | OUTPATIENT
Start: 2018-09-25 | End: 2018-09-25 | Stop reason: HOSPADM

## 2018-09-25 RX ORDER — FENTANYL CITRATE 50 UG/ML
50 INJECTION, SOLUTION INTRAMUSCULAR; INTRAVENOUS
Status: DISCONTINUED | OUTPATIENT
Start: 2018-09-25 | End: 2018-09-25 | Stop reason: HOSPADM

## 2018-09-25 RX ORDER — PROMETHAZINE HYDROCHLORIDE 25 MG/1
12.5 TABLET ORAL ONCE AS NEEDED
Status: DISCONTINUED | OUTPATIENT
Start: 2018-09-25 | End: 2018-09-25 | Stop reason: HOSPADM

## 2018-09-25 RX ORDER — FAMOTIDINE 10 MG/ML
20 INJECTION, SOLUTION INTRAVENOUS ONCE
Status: COMPLETED | OUTPATIENT
Start: 2018-09-25 | End: 2018-09-25

## 2018-09-25 RX ORDER — PROMETHAZINE HYDROCHLORIDE 25 MG/1
25 SUPPOSITORY RECTAL ONCE AS NEEDED
Status: DISCONTINUED | OUTPATIENT
Start: 2018-09-25 | End: 2018-09-25 | Stop reason: HOSPADM

## 2018-09-25 RX ORDER — CEFAZOLIN SODIUM 2 G/100ML
2 INJECTION, SOLUTION INTRAVENOUS ONCE
Status: DISCONTINUED | OUTPATIENT
Start: 2018-09-25 | End: 2018-09-25 | Stop reason: HOSPADM

## 2018-09-25 RX ORDER — LABETALOL HYDROCHLORIDE 5 MG/ML
5 INJECTION, SOLUTION INTRAVENOUS
Status: DISCONTINUED | OUTPATIENT
Start: 2018-09-25 | End: 2018-09-25 | Stop reason: HOSPADM

## 2018-09-25 RX ORDER — EPHEDRINE SULFATE 50 MG/ML
5 INJECTION, SOLUTION INTRAVENOUS ONCE AS NEEDED
Status: DISCONTINUED | OUTPATIENT
Start: 2018-09-25 | End: 2018-09-25 | Stop reason: HOSPADM

## 2018-09-25 RX ORDER — FENTANYL CITRATE 50 UG/ML
INJECTION, SOLUTION INTRAMUSCULAR; INTRAVENOUS AS NEEDED
Status: DISCONTINUED | OUTPATIENT
Start: 2018-09-25 | End: 2018-09-25 | Stop reason: SURG

## 2018-09-25 RX ORDER — FLUMAZENIL 0.1 MG/ML
0.2 INJECTION INTRAVENOUS AS NEEDED
Status: DISCONTINUED | OUTPATIENT
Start: 2018-09-25 | End: 2018-09-25 | Stop reason: HOSPADM

## 2018-09-25 RX ORDER — MIDAZOLAM HYDROCHLORIDE 1 MG/ML
1 INJECTION INTRAMUSCULAR; INTRAVENOUS
Status: DISCONTINUED | OUTPATIENT
Start: 2018-09-25 | End: 2018-09-25 | Stop reason: HOSPADM

## 2018-09-25 RX ORDER — PROPOFOL 10 MG/ML
VIAL (ML) INTRAVENOUS CONTINUOUS PRN
Status: DISCONTINUED | OUTPATIENT
Start: 2018-09-25 | End: 2018-09-25 | Stop reason: SURG

## 2018-09-25 RX ADMIN — SODIUM CHLORIDE, POTASSIUM CHLORIDE, SODIUM LACTATE AND CALCIUM CHLORIDE 9 ML/HR: 600; 310; 30; 20 INJECTION, SOLUTION INTRAVENOUS at 15:30

## 2018-09-25 RX ADMIN — FENTANYL CITRATE 50 MCG: 50 INJECTION, SOLUTION INTRAMUSCULAR; INTRAVENOUS at 16:09

## 2018-09-25 RX ADMIN — ROPIVACAINE HYDROCHLORIDE 30 ML: 5 INJECTION, SOLUTION EPIDURAL; INFILTRATION; PERINEURAL at 16:02

## 2018-09-25 RX ADMIN — PROPOFOL 75 MCG/KG/MIN: 10 INJECTION, EMULSION INTRAVENOUS at 17:03

## 2018-09-25 RX ADMIN — MEPIVACAINE HYDROCHLORIDE 10 ML: 15 INJECTION, SOLUTION EPIDURAL; INFILTRATION at 16:02

## 2018-09-25 RX ADMIN — FENTANYL CITRATE 50 MCG: 50 INJECTION INTRAMUSCULAR; INTRAVENOUS at 17:41

## 2018-09-25 RX ADMIN — PROPOFOL 50 MG: 10 INJECTION, EMULSION INTRAVENOUS at 17:03

## 2018-09-25 RX ADMIN — FAMOTIDINE 20 MG: 10 INJECTION, SOLUTION INTRAVENOUS at 16:09

## 2018-09-25 RX ADMIN — FENTANYL CITRATE 50 MCG: 50 INJECTION INTRAMUSCULAR; INTRAVENOUS at 17:00

## 2018-09-25 RX ADMIN — MIDAZOLAM HYDROCHLORIDE 2 MG: 2 INJECTION, SOLUTION INTRAMUSCULAR; INTRAVENOUS at 16:09

## 2018-09-25 RX ADMIN — ONDANSETRON 4 MG: 2 INJECTION INTRAMUSCULAR; INTRAVENOUS at 18:05

## 2018-09-25 RX ADMIN — LIDOCAINE HYDROCHLORIDE 100 MG: 20 INJECTION, SOLUTION INFILTRATION; PERINEURAL at 17:03

## 2018-09-25 NOTE — ANESTHESIA PREPROCEDURE EVALUATION
Anesthesia Evaluation     Patient summary reviewed and Nursing notes reviewed                Airway   Mallampati: III  Possible difficult intubation  Dental      Pulmonary - negative pulmonary ROS   Cardiovascular     ECG reviewed  Rhythm: regular  Rate: normal    (+) hypertension,       Neuro/Psych- negative ROS  GI/Hepatic/Renal/Endo    (+)  GERD,  diabetes mellitus type 2,     Musculoskeletal     (+) joint swelling,   Abdominal    Substance History - negative use     OB/GYN negative ob/gyn ROS         Other   (+) arthritis                     Anesthesia Plan    ASA 3     regional   (Regional plus propofol sedation)  Anesthetic plan, all risks, benefits, and alternatives have been provided, discussed and informed consent has been obtained with: patient.

## 2018-09-25 NOTE — ANESTHESIA POSTPROCEDURE EVALUATION
"Patient: Bob Biggs Sr.    Procedure Summary     Date:  09/25/18 Room / Location:  [facility] OR  / [facility] OR    Anesthesia Start:  1657 Anesthesia Stop:  1826    Procedure:  CLOSED REDUCTION AND LT. SMALL PERCUTANEOUS FINGER PINNING (Left Fingers) Diagnosis:      Surgeon:  Raghu Porras MD Provider:  Froilan Gardiner MD    Anesthesia Type:  regional ASA Status:  3          Anesthesia Type: regional  Last vitals  BP   128/95 (09/25/18 1619)   Temp   36.9 °C (98.4 °F) (09/25/18 1520)   Pulse   63 (09/25/18 1619)   Resp   20 (09/25/18 1619)     SpO2   99 % (09/25/18 1619)     Post Anesthesia Care and Evaluation    Patient location during evaluation: bedside  Patient participation: complete - patient participated  Level of consciousness: awake  Pain management: adequate  Airway patency: patent  Anesthetic complications: No anesthetic complications    Cardiovascular status: acceptable  Respiratory status: acceptable  Hydration status: acceptable    Comments: /95 (BP Location: Right arm, Patient Position: Lying)   Pulse 63   Temp 36.9 °C (98.4 °F) (Oral)   Resp 20   Ht 177.8 cm (70\")   Wt 88.8 kg (195 lb 11.2 oz)   SpO2 99%   BMI 28.08 kg/m²         "

## 2018-09-25 NOTE — PROGRESS NOTES
History & Physical       Patient: Bob Biggs    YOB: 1955    Medical Record Number: [id number]    Chief Complaints: Left knee endstage osteoarthritis    History of Present Illness: 63 y.o. male presents today in anticipation of upcoming knee replacement surgery.  Patient has a long history of worsening symptoms.  Describes the pain as severe, constant, and typically dull and achy.  Patient has tried and failed prolonged conservative treatment.  Patient is struggling with routine daily activities and this has become a significant issue for overall quality of life.  Patient cannot walk any prolonged distances without having to rest.  Patient presents today in anticipation of a total knee arthroplasty     Allergies:   Allergies   Allergen Reactions   • No Known Drug Allergy        Medications:   Home Medications:    Current Outpatient Prescriptions:   •  acetaminophen (TYLENOL) 650 MG 8 hr tablet, Take 650 mg by mouth Every 8 (Eight) Hours As Needed for Mild Pain ., Disp: , Rfl:   •  betamethasone dipropionate (DIPROLENE) 0.05 % cream, Apply  topically 2 (Two) Times a Day. (Patient taking differently: Apply  topically to the appropriate area as directed As Needed.), Disp: 15 g, Rfl: 0  •  cephalexin (KEFLEX) 500 MG capsule, Take 1 capsule by mouth 3 (Three) Times a Day for 10 days., Disp: 30 capsule, Rfl: 0  •  cetirizine (zyrTEC) 10 MG tablet, Take 10 mg by mouth Daily., Disp: , Rfl:   •  Glucos-MSM-C-Jp-Ovzjfz-Tommld (GLUCOSAMINE MSM COMPLEX) tablet, Take 1,500 mg by mouth Every Night. HOLD FOR SURGERY, Disp: , Rfl:   •  glucose blood test strip, Patient tests two times a day, Disp: 100 each, Rfl: 12  •  metFORMIN ER (GLUCOPHAGE-XR) 500 MG 24 hr tablet, Take 1 tablet by mouth Daily With Breakfast., Disp: 90 tablet, Rfl: 3  •  Multiple Vitamins-Minerals (CENTRUM SILVER ADULT 50+) tablet, Take  by mouth Daily. HOLD FOR SURGERY, Disp: , Rfl:   •  ONE TOUCH LANCETS misc, 1 each 2 (Two) Times a Day.,  "Disp: 200 each, Rfl: 1  •  pantoprazole (PROTONIX) 40 MG EC tablet, Take 40 mg by mouth 2 (Two) Times a Day., Disp: , Rfl:   •  valACYclovir (VALTREX) 500 MG tablet, TAKE 1 TABLET EVERY DAY, Disp: 90 tablet, Rfl: 2  •  guaiFENesin (MUCINEX) 600 MG 12 hr tablet, Take 1,200 mg by mouth As Needed for Cough., Disp: , Rfl:   •  pantoprazole (PROTONIX) 40 MG EC tablet, TAKE 1 TABLET BY MOUTH TWICE DAILY, Disp: 180 tablet, Rfl: 1    Past Medical History:   Diagnosis Date   • Arthritis    • Diabetes mellitus (CMS/HCC)    • GERD (gastroesophageal reflux disease)    • Hypertension    • Painful swelling of joint    • Tear of meniscus of knee           Past Surgical History:   Procedure Laterality Date   • COLONOSCOPY  2008   • HYDROCELECTOMY      late 1990s   • KNEE ARTHROSCOPY Left 2007, 2011   • KNEE SURGERY  03/03/2016    Right TKA          Social History     Occupational History   • Not on file.     Social History Main Topics   • Smoking status: Never Smoker   • Smokeless tobacco: Never Used   • Alcohol use No   • Drug use: Yes     Types: \"Crack\" cocaine      Comment: Recovered 17 years   • Sexual activity: Defer      Social History     Social History Narrative   • No narrative on file          Family History   Problem Relation Age of Onset   • Rheumatologic disease Mother    • Heart disease Father    • Cancer Sister    • Diabetes Brother    • Cancer Brother    • No Known Problems Daughter    • No Known Problems Son    • No Known Problems Other    • No Known Problems Daughter    • Malig Hyperthermia Neg Hx      Review of Systems:  A 14 point review of systems is reviewed with the patient.  Pertinent positives are listed above.  All others are negative.    Physical Exam: 63 y.o. male    Vitals:    09/24/18 0843   Temp: 99.6 °F (37.6 °C)   Weight: 90.4 kg (199 lb 6.4 oz)   Height: 177.8 cm (70\")     General:  Patient is awake and alert.  Appears in no acute distress or discomfort.    Psych:  Affect and demeanor are " appropriate.    Eyes:  Conjunctiva and sclera appear grossly normal.  Eyes track well and EOM seem to be intact.    Ears:  No gross abnormalities.  Hearing adequate for the exam.    Cardiovascular:  Regular rate and rhythm.    Lungs:  Good chest expansion.  Breathing unlabored.    Lymph:  No palpable adenopathy about neck or axilla.    Left lower extremity:  Skin benign and intact without evidence for swelling, masses.  There is a well-healed scar over the medial calf where he has some atrophy.  No palpable masses.  He has obvious varus alignment.  Focal tenderness noted over the medial joint line but he also has tenderness laterally and anteriorly.  ROM is from 5° shy of full extension to 110° of flexion.  Knee is stable on exam.  Good strength throughout the lower leg and foot.  Intact sensation throughout.  Palpable pedal pulses with brisk cap refill.    Diagnostic Tests:  Lab Results   Component Value Date    GLUCOSE 147 (H) 09/20/2018    CALCIUM 9.3 09/20/2018     09/20/2018    K 4.5 09/20/2018    CO2 26.8 09/20/2018     09/20/2018    BUN 17 09/20/2018    CREATININE 0.91 09/20/2018    EGFRIFAFRI 102 09/20/2018    EGFRIFNONA 93 09/21/2017    BCR 18.7 09/20/2018    ANIONGAP 10.2 09/20/2018     Lab Results   Component Value Date    WBC 7.15 09/20/2018    HGB 13.8 09/20/2018    HCT 42.1 09/20/2018    MCV 92.1 09/20/2018     09/20/2018     No results found for: INR, PROTIME    Imaging:  Previous x-rays of the knee are reviewed.  The studies show severe diffuse tricompartmental osteoarthritis with varus alignment.    Assessment:  Left knee endstage osteoarthritis    Plan: We will plan on proceeding with a left total knee arthroplasty at the patient's request.  I reviewed details of procedure with patient today and discussed all the risks, benefits, alternatives, and limitations of the procedure in laymen's terms with the risks including but not limited to:  neurovascular damage resulting in  permanent dysfunction or footdrop and potential need for further surgery, bleeding, infection, hematoma, chronic pain, worsening of pain, persistent symptoms potentially necessitating revision, prosthesis related problems including loosening or allergy, swelling, loss of motion and arthrofibrosis, weakness, stiffness, instability, DVT, pulmonary embolus, death, stroke, complex regional pain syndrome, and need for additional procedures.  Patient verbalized understanding, and was given the opportunity to ask and have all questions answered today.  No guarantees were given regarding results of surgery.      Date: 9/25/2018    Nato Guerin MD

## 2018-09-25 NOTE — ANESTHESIA PROCEDURE NOTES
Peripheral Block    Pre-sedation assessment completed: 9/25/2018 4:02 PM    Patient reassessed immediately prior to procedure    Patient location during procedure: holding area  Start time: 9/25/2018 4:02 PM  Stop time: 9/25/2018 4:12 PM  Reason for block: at surgeon's request and post-op pain management  Performed by  Anesthesiologist: DAMI PABLO  Preanesthetic Checklist  Completed: patient identified, site marked, surgical consent, pre-op evaluation, timeout performed, IV checked, risks and benefits discussed and monitors and equipment checked  Prep:  Sterile barriers:cap, gloves, gown, mask and sterile barriers  Prep: ChloraPrep  Patient monitoring: blood pressure monitoring, continuous pulse oximetry and EKG  Procedure  Sedation:yes    Guidance:ultrasound guided  ULTRASOUND INTERPRETATION.  Using ultrasound guidance a 21 G gauge needle was placed in close proximity to the brachial plexus nerve, at which point, under ultrasound guidance anesthetic was injected in the area of the nerve and spread of the anesthesia was seen on ultrasound in close proximity thereto.  There were no abnormalities seen on ultrasound; a digital image was taken; and the patient tolerated the procedure with no complications. Images:still images obtained    Laterality:left  Block Type:supraclavicular  Injection Technique:single-shot  Needle Type:echogenic  Needle Gauge:21 G    Medications  Local Injected:ropivacaine 0.5% and 1.5% Mepivacaine  Post Assessment  Injection Assessment: negative aspiration for heme, no paresthesia on injection and incremental injection  Patient Tolerance:comfortable throughout block  Complications:no

## 2018-09-26 ENCOUNTER — TELEPHONE (OUTPATIENT)
Dept: ORTHOPEDIC SURGERY | Facility: CLINIC | Age: 63
End: 2018-09-26

## 2018-10-29 RX ORDER — VALACYCLOVIR HYDROCHLORIDE 500 MG/1
TABLET, FILM COATED ORAL
Qty: 90 TABLET | Refills: 2 | Status: SHIPPED | OUTPATIENT
Start: 2018-10-29 | End: 2018-11-07

## 2018-11-08 ENCOUNTER — HOSPITAL ENCOUNTER (INPATIENT)
Facility: HOSPITAL | Age: 63
LOS: 1 days | Discharge: HOME HEALTH | End: 2018-11-09
Attending: ORTHOPAEDIC SURGERY | Admitting: ORTHOPAEDIC SURGERY

## 2018-11-08 ENCOUNTER — ANESTHESIA (OUTPATIENT)
Dept: PERIOP | Facility: HOSPITAL | Age: 63
End: 2018-11-08

## 2018-11-08 ENCOUNTER — APPOINTMENT (OUTPATIENT)
Dept: GENERAL RADIOLOGY | Facility: HOSPITAL | Age: 63
End: 2018-11-08

## 2018-11-08 ENCOUNTER — ANESTHESIA EVENT (OUTPATIENT)
Dept: PERIOP | Facility: HOSPITAL | Age: 63
End: 2018-11-08

## 2018-11-08 DIAGNOSIS — M17.12 PRIMARY OSTEOARTHRITIS OF LEFT KNEE: ICD-10-CM

## 2018-11-08 DIAGNOSIS — M17.11 PRIMARY OSTEOARTHRITIS OF RIGHT KNEE: ICD-10-CM

## 2018-11-08 DIAGNOSIS — R26.89 DECREASED MOBILITY: Primary | ICD-10-CM

## 2018-11-08 LAB
ABO GROUP BLD: NORMAL
ANION GAP SERPL CALCULATED.3IONS-SCNC: 11.4 MMOL/L
BASOPHILS # BLD AUTO: 0.02 10*3/MM3 (ref 0–0.2)
BASOPHILS NFR BLD AUTO: 0.3 % (ref 0–1.5)
BILIRUB UR QL STRIP: NEGATIVE
BLD GP AB SCN SERPL QL: NEGATIVE
BUN BLD-MCNC: 17 MG/DL (ref 8–23)
BUN/CREAT SERPL: 17.5 (ref 7–25)
CALCIUM SPEC-SCNC: 9.3 MG/DL (ref 8.6–10.5)
CHLORIDE SERPL-SCNC: 104 MMOL/L (ref 98–107)
CLARITY UR: CLEAR
CO2 SERPL-SCNC: 24.6 MMOL/L (ref 22–29)
COLOR UR: YELLOW
CREAT BLD-MCNC: 0.97 MG/DL (ref 0.76–1.27)
DEPRECATED RDW RBC AUTO: 46.3 FL (ref 37–54)
EOSINOPHIL # BLD AUTO: 0.23 10*3/MM3 (ref 0–0.7)
EOSINOPHIL NFR BLD AUTO: 3 % (ref 0.3–6.2)
ERYTHROCYTE [DISTWIDTH] IN BLOOD BY AUTOMATED COUNT: 14 % (ref 11.5–14.5)
GFR SERPL CREATININE-BSD FRML MDRD: 95 ML/MIN/1.73
GLUCOSE BLD-MCNC: 138 MG/DL (ref 65–99)
GLUCOSE BLDC GLUCOMTR-MCNC: 115 MG/DL (ref 70–130)
GLUCOSE BLDC GLUCOMTR-MCNC: 163 MG/DL (ref 70–130)
GLUCOSE UR STRIP-MCNC: NEGATIVE MG/DL
HCT VFR BLD AUTO: 40.8 % (ref 40.4–52.2)
HGB BLD-MCNC: 13.6 G/DL (ref 13.7–17.6)
HGB UR QL STRIP.AUTO: NEGATIVE
IMM GRANULOCYTES # BLD: 0.02 10*3/MM3 (ref 0–0.03)
IMM GRANULOCYTES NFR BLD: 0.3 % (ref 0–0.5)
KETONES UR QL STRIP: NEGATIVE
LEUKOCYTE ESTERASE UR QL STRIP.AUTO: NEGATIVE
LYMPHOCYTES # BLD AUTO: 2.39 10*3/MM3 (ref 0.9–4.8)
LYMPHOCYTES NFR BLD AUTO: 30.8 % (ref 19.6–45.3)
MCH RBC QN AUTO: 30.4 PG (ref 27–32.7)
MCHC RBC AUTO-ENTMCNC: 33.3 G/DL (ref 32.6–36.4)
MCV RBC AUTO: 91.1 FL (ref 79.8–96.2)
MONOCYTES # BLD AUTO: 0.44 10*3/MM3 (ref 0.2–1.2)
MONOCYTES NFR BLD AUTO: 5.7 % (ref 5–12)
NEUTROPHILS # BLD AUTO: 4.69 10*3/MM3 (ref 1.9–8.1)
NEUTROPHILS NFR BLD AUTO: 60.2 % (ref 42.7–76)
NITRITE UR QL STRIP: NEGATIVE
PH UR STRIP.AUTO: 7.5 [PH] (ref 5–8)
PLATELET # BLD AUTO: 206 10*3/MM3 (ref 140–500)
PMV BLD AUTO: 10.8 FL (ref 6–12)
POTASSIUM BLD-SCNC: 4.2 MMOL/L (ref 3.5–5.2)
PROT UR QL STRIP: NEGATIVE
RBC # BLD AUTO: 4.48 10*6/MM3 (ref 4.6–6)
RH BLD: POSITIVE
SODIUM BLD-SCNC: 140 MMOL/L (ref 136–145)
SP GR UR STRIP: 1.02 (ref 1–1.03)
T&S EXPIRATION DATE: NORMAL
UROBILINOGEN UR QL STRIP: NORMAL
WBC NRBC COR # BLD: 7.77 10*3/MM3 (ref 4.5–10.7)

## 2018-11-08 PROCEDURE — 25010000003 CEFAZOLIN IN DEXTROSE 2-4 GM/100ML-% SOLUTION: Performed by: ORTHOPAEDIC SURGERY

## 2018-11-08 PROCEDURE — 25010000002 ONDANSETRON PER 1 MG: Performed by: NURSE ANESTHETIST, CERTIFIED REGISTERED

## 2018-11-08 PROCEDURE — 86850 RBC ANTIBODY SCREEN: CPT | Performed by: ORTHOPAEDIC SURGERY

## 2018-11-08 PROCEDURE — C1713 ANCHOR/SCREW BN/BN,TIS/BN: HCPCS | Performed by: ORTHOPAEDIC SURGERY

## 2018-11-08 PROCEDURE — 85025 COMPLETE CBC W/AUTO DIFF WBC: CPT | Performed by: ORTHOPAEDIC SURGERY

## 2018-11-08 PROCEDURE — 81003 URINALYSIS AUTO W/O SCOPE: CPT | Performed by: ORTHOPAEDIC SURGERY

## 2018-11-08 PROCEDURE — 80048 BASIC METABOLIC PNL TOTAL CA: CPT | Performed by: ORTHOPAEDIC SURGERY

## 2018-11-08 PROCEDURE — 86900 BLOOD TYPING SEROLOGIC ABO: CPT | Performed by: ORTHOPAEDIC SURGERY

## 2018-11-08 PROCEDURE — 25010000002 FENTANYL CITRATE (PF) 100 MCG/2ML SOLUTION: Performed by: NURSE ANESTHETIST, CERTIFIED REGISTERED

## 2018-11-08 PROCEDURE — 25010000002 HYDROMORPHONE PER 4 MG: Performed by: ORTHOPAEDIC SURGERY

## 2018-11-08 PROCEDURE — 25010000002 ROPIVACAINE PER 1 MG: Performed by: ANESTHESIOLOGY

## 2018-11-08 PROCEDURE — 97110 THERAPEUTIC EXERCISES: CPT

## 2018-11-08 PROCEDURE — 27447 TOTAL KNEE ARTHROPLASTY: CPT | Performed by: ORTHOPAEDIC SURGERY

## 2018-11-08 PROCEDURE — 82962 GLUCOSE BLOOD TEST: CPT

## 2018-11-08 PROCEDURE — 25010000002 PROMETHAZINE PER 50 MG: Performed by: NURSE ANESTHETIST, CERTIFIED REGISTERED

## 2018-11-08 PROCEDURE — 25010000002 HYDROMORPHONE PER 4 MG: Performed by: NURSE ANESTHETIST, CERTIFIED REGISTERED

## 2018-11-08 PROCEDURE — 25010000002 PROPOFOL 10 MG/ML EMULSION: Performed by: NURSE ANESTHETIST, CERTIFIED REGISTERED

## 2018-11-08 PROCEDURE — 25010000002 MIDAZOLAM PER 1 MG: Performed by: ANESTHESIOLOGY

## 2018-11-08 PROCEDURE — 97161 PT EVAL LOW COMPLEX 20 MIN: CPT

## 2018-11-08 PROCEDURE — G8978 MOBILITY CURRENT STATUS: HCPCS

## 2018-11-08 PROCEDURE — C1776 JOINT DEVICE (IMPLANTABLE): HCPCS | Performed by: ORTHOPAEDIC SURGERY

## 2018-11-08 PROCEDURE — 86901 BLOOD TYPING SEROLOGIC RH(D): CPT | Performed by: ORTHOPAEDIC SURGERY

## 2018-11-08 PROCEDURE — G8979 MOBILITY GOAL STATUS: HCPCS

## 2018-11-08 PROCEDURE — 25010000002 FENTANYL CITRATE (PF) 100 MCG/2ML SOLUTION: Performed by: ANESTHESIOLOGY

## 2018-11-08 PROCEDURE — 73560 X-RAY EXAM OF KNEE 1 OR 2: CPT

## 2018-11-08 PROCEDURE — 0SRD069 REPLACEMENT OF LEFT KNEE JOINT WITH OXIDIZED ZIRCONIUM ON POLYETHYLENE SYNTHETIC SUBSTITUTE, CEMENTED, OPEN APPROACH: ICD-10-PCS | Performed by: ORTHOPAEDIC SURGERY

## 2018-11-08 PROCEDURE — 25010000002 VANCOMYCIN 10 G RECONSTITUTED SOLUTION: Performed by: ORTHOPAEDIC SURGERY

## 2018-11-08 DEVICE — IMPLANTABLE DEVICE: Type: IMPLANTABLE DEVICE | Site: KNEE | Status: FUNCTIONAL

## 2018-11-08 DEVICE — GEN II 7.5MM RESUR PAT 35MM
Type: IMPLANTABLE DEVICE | Site: KNEE | Status: FUNCTIONAL
Brand: GENESIS II

## 2018-11-08 DEVICE — LEGION POSTERIOR STABILIZED                                    OXINIUM FEMORAL SIZE 6 LEFT
Type: IMPLANTABLE DEVICE | Site: KNEE | Status: FUNCTIONAL
Brand: LEGION

## 2018-11-08 DEVICE — CMT BONE PALACOS R HI/VISC 1X40: Type: IMPLANTABLE DEVICE | Site: KNEE | Status: FUNCTIONAL

## 2018-11-08 DEVICE — GENESIS II NON-POROUS TIBIAL                                    BASEPLATE SIZE 6 LT
Type: IMPLANTABLE DEVICE | Site: KNEE | Status: FUNCTIONAL
Brand: GENESIS II

## 2018-11-08 DEVICE — LEGION PS HIGH FLEX XLPE SZ 5-6 10MM
Type: IMPLANTABLE DEVICE | Site: KNEE | Status: FUNCTIONAL
Brand: LEGION

## 2018-11-08 RX ORDER — PREGABALIN 75 MG/1
150 CAPSULE ORAL ONCE
Status: COMPLETED | OUTPATIENT
Start: 2018-11-08 | End: 2018-11-08

## 2018-11-08 RX ORDER — ACETAMINOPHEN 500 MG
1000 TABLET ORAL ONCE
Status: COMPLETED | OUTPATIENT
Start: 2018-11-08 | End: 2018-11-08

## 2018-11-08 RX ORDER — LABETALOL HYDROCHLORIDE 5 MG/ML
5 INJECTION, SOLUTION INTRAVENOUS
Status: DISCONTINUED | OUTPATIENT
Start: 2018-11-08 | End: 2018-11-08 | Stop reason: HOSPADM

## 2018-11-08 RX ORDER — FLUMAZENIL 0.1 MG/ML
0.2 INJECTION INTRAVENOUS AS NEEDED
Status: DISCONTINUED | OUTPATIENT
Start: 2018-11-08 | End: 2018-11-08 | Stop reason: HOSPADM

## 2018-11-08 RX ORDER — CEFAZOLIN SODIUM 2 G/100ML
2 INJECTION, SOLUTION INTRAVENOUS EVERY 8 HOURS
Status: COMPLETED | OUTPATIENT
Start: 2018-11-08 | End: 2018-11-09

## 2018-11-08 RX ORDER — DOCUSATE SODIUM 100 MG/1
100 CAPSULE, LIQUID FILLED ORAL 2 TIMES DAILY
Status: DISCONTINUED | OUTPATIENT
Start: 2018-11-08 | End: 2018-11-09 | Stop reason: HOSPADM

## 2018-11-08 RX ORDER — MIDAZOLAM HYDROCHLORIDE 1 MG/ML
1 INJECTION INTRAMUSCULAR; INTRAVENOUS
Status: DISCONTINUED | OUTPATIENT
Start: 2018-11-08 | End: 2018-11-08 | Stop reason: HOSPADM

## 2018-11-08 RX ORDER — HYDROMORPHONE HCL 110MG/55ML
PATIENT CONTROLLED ANALGESIA SYRINGE INTRAVENOUS AS NEEDED
Status: DISCONTINUED | OUTPATIENT
Start: 2018-11-08 | End: 2018-11-08 | Stop reason: SURG

## 2018-11-08 RX ORDER — PROMETHAZINE HYDROCHLORIDE 25 MG/ML
12.5 INJECTION, SOLUTION INTRAMUSCULAR; INTRAVENOUS ONCE AS NEEDED
Status: COMPLETED | OUTPATIENT
Start: 2018-11-08 | End: 2018-11-08

## 2018-11-08 RX ORDER — SODIUM CHLORIDE 0.9 % (FLUSH) 0.9 %
1-10 SYRINGE (ML) INJECTION AS NEEDED
Status: DISCONTINUED | OUTPATIENT
Start: 2018-11-08 | End: 2018-11-08 | Stop reason: HOSPADM

## 2018-11-08 RX ORDER — PANTOPRAZOLE SODIUM 40 MG/1
40 TABLET, DELAYED RELEASE ORAL 2 TIMES DAILY
Status: DISCONTINUED | OUTPATIENT
Start: 2018-11-08 | End: 2018-11-09 | Stop reason: HOSPADM

## 2018-11-08 RX ORDER — MELOXICAM 15 MG/1
15 TABLET ORAL ONCE
Status: COMPLETED | OUTPATIENT
Start: 2018-11-08 | End: 2018-11-08

## 2018-11-08 RX ORDER — HYDROCODONE BITARTRATE AND ACETAMINOPHEN 7.5; 325 MG/1; MG/1
2 TABLET ORAL EVERY 4 HOURS PRN
Status: DISCONTINUED | OUTPATIENT
Start: 2018-11-08 | End: 2018-11-09 | Stop reason: HOSPADM

## 2018-11-08 RX ORDER — MAGNESIUM HYDROXIDE 1200 MG/15ML
LIQUID ORAL AS NEEDED
Status: DISCONTINUED | OUTPATIENT
Start: 2018-11-08 | End: 2018-11-08 | Stop reason: HOSPADM

## 2018-11-08 RX ORDER — DIPHENHYDRAMINE HYDROCHLORIDE 50 MG/ML
12.5 INJECTION INTRAMUSCULAR; INTRAVENOUS
Status: DISCONTINUED | OUTPATIENT
Start: 2018-11-08 | End: 2018-11-08 | Stop reason: HOSPADM

## 2018-11-08 RX ORDER — HYDROCODONE BITARTRATE AND ACETAMINOPHEN 7.5; 325 MG/1; MG/1
1 TABLET ORAL EVERY 4 HOURS PRN
Status: DISCONTINUED | OUTPATIENT
Start: 2018-11-08 | End: 2018-11-09 | Stop reason: HOSPADM

## 2018-11-08 RX ORDER — TRANEXAMIC ACID 100 MG/ML
INJECTION, SOLUTION INTRAVENOUS AS NEEDED
Status: DISCONTINUED | OUTPATIENT
Start: 2018-11-08 | End: 2018-11-08 | Stop reason: SURG

## 2018-11-08 RX ORDER — ASPIRIN 325 MG
325 TABLET, DELAYED RELEASE (ENTERIC COATED) ORAL DAILY
Status: DISCONTINUED | OUTPATIENT
Start: 2018-11-09 | End: 2018-11-09 | Stop reason: HOSPADM

## 2018-11-08 RX ORDER — ONDANSETRON 2 MG/ML
INJECTION INTRAMUSCULAR; INTRAVENOUS AS NEEDED
Status: DISCONTINUED | OUTPATIENT
Start: 2018-11-08 | End: 2018-11-08 | Stop reason: SURG

## 2018-11-08 RX ORDER — ONDANSETRON 2 MG/ML
4 INJECTION INTRAMUSCULAR; INTRAVENOUS EVERY 6 HOURS PRN
Status: DISCONTINUED | OUTPATIENT
Start: 2018-11-08 | End: 2018-11-09 | Stop reason: HOSPADM

## 2018-11-08 RX ORDER — CEFAZOLIN SODIUM 2 G/100ML
2 INJECTION, SOLUTION INTRAVENOUS ONCE
Status: COMPLETED | OUTPATIENT
Start: 2018-11-08 | End: 2018-11-08

## 2018-11-08 RX ORDER — ONDANSETRON 4 MG/1
4 TABLET, FILM COATED ORAL EVERY 6 HOURS PRN
Status: DISCONTINUED | OUTPATIENT
Start: 2018-11-08 | End: 2018-11-09 | Stop reason: HOSPADM

## 2018-11-08 RX ORDER — HYDROMORPHONE HYDROCHLORIDE 1 MG/ML
0.5 INJECTION, SOLUTION INTRAMUSCULAR; INTRAVENOUS; SUBCUTANEOUS
Status: DISCONTINUED | OUTPATIENT
Start: 2018-11-08 | End: 2018-11-09 | Stop reason: HOSPADM

## 2018-11-08 RX ORDER — FAMOTIDINE 10 MG/ML
20 INJECTION, SOLUTION INTRAVENOUS ONCE
Status: COMPLETED | OUTPATIENT
Start: 2018-11-08 | End: 2018-11-08

## 2018-11-08 RX ORDER — ROPIVACAINE HYDROCHLORIDE 5 MG/ML
INJECTION, SOLUTION EPIDURAL; INFILTRATION; PERINEURAL AS NEEDED
Status: DISCONTINUED | OUTPATIENT
Start: 2018-11-08 | End: 2018-11-08 | Stop reason: SURG

## 2018-11-08 RX ORDER — METFORMIN HYDROCHLORIDE 500 MG/1
500 TABLET, EXTENDED RELEASE ORAL
Status: DISCONTINUED | OUTPATIENT
Start: 2018-11-09 | End: 2018-11-09 | Stop reason: HOSPADM

## 2018-11-08 RX ORDER — OXYCODONE AND ACETAMINOPHEN 7.5; 325 MG/1; MG/1
1 TABLET ORAL EVERY 4 HOURS PRN
Status: DISCONTINUED | OUTPATIENT
Start: 2018-11-08 | End: 2018-11-08

## 2018-11-08 RX ORDER — ONDANSETRON 2 MG/ML
4 INJECTION INTRAMUSCULAR; INTRAVENOUS ONCE AS NEEDED
Status: COMPLETED | OUTPATIENT
Start: 2018-11-08 | End: 2018-11-08

## 2018-11-08 RX ORDER — MIDAZOLAM HYDROCHLORIDE 1 MG/ML
2 INJECTION INTRAMUSCULAR; INTRAVENOUS
Status: DISCONTINUED | OUTPATIENT
Start: 2018-11-08 | End: 2018-11-08 | Stop reason: HOSPADM

## 2018-11-08 RX ORDER — PROPOFOL 10 MG/ML
VIAL (ML) INTRAVENOUS AS NEEDED
Status: DISCONTINUED | OUTPATIENT
Start: 2018-11-08 | End: 2018-11-08 | Stop reason: SURG

## 2018-11-08 RX ORDER — NALOXONE HCL 0.4 MG/ML
0.1 VIAL (ML) INJECTION
Status: DISCONTINUED | OUTPATIENT
Start: 2018-11-08 | End: 2018-11-09 | Stop reason: HOSPADM

## 2018-11-08 RX ORDER — EPHEDRINE SULFATE 50 MG/ML
5 INJECTION, SOLUTION INTRAVENOUS ONCE AS NEEDED
Status: DISCONTINUED | OUTPATIENT
Start: 2018-11-08 | End: 2018-11-08 | Stop reason: HOSPADM

## 2018-11-08 RX ORDER — LIDOCAINE HYDROCHLORIDE 10 MG/ML
0.5 INJECTION, SOLUTION EPIDURAL; INFILTRATION; INTRACAUDAL; PERINEURAL ONCE AS NEEDED
Status: DISCONTINUED | OUTPATIENT
Start: 2018-11-08 | End: 2018-11-08 | Stop reason: HOSPADM

## 2018-11-08 RX ORDER — LIDOCAINE HYDROCHLORIDE 20 MG/ML
INJECTION, SOLUTION INFILTRATION; PERINEURAL AS NEEDED
Status: DISCONTINUED | OUTPATIENT
Start: 2018-11-08 | End: 2018-11-08 | Stop reason: SURG

## 2018-11-08 RX ORDER — SODIUM CHLORIDE, SODIUM LACTATE, POTASSIUM CHLORIDE, CALCIUM CHLORIDE 600; 310; 30; 20 MG/100ML; MG/100ML; MG/100ML; MG/100ML
100 INJECTION, SOLUTION INTRAVENOUS CONTINUOUS
Status: DISCONTINUED | OUTPATIENT
Start: 2018-11-08 | End: 2018-11-09 | Stop reason: HOSPADM

## 2018-11-08 RX ORDER — NALOXONE HCL 0.4 MG/ML
0.2 VIAL (ML) INJECTION AS NEEDED
Status: DISCONTINUED | OUTPATIENT
Start: 2018-11-08 | End: 2018-11-08 | Stop reason: HOSPADM

## 2018-11-08 RX ORDER — FENTANYL CITRATE 50 UG/ML
50 INJECTION, SOLUTION INTRAMUSCULAR; INTRAVENOUS
Status: DISCONTINUED | OUTPATIENT
Start: 2018-11-08 | End: 2018-11-08 | Stop reason: HOSPADM

## 2018-11-08 RX ORDER — PROMETHAZINE HYDROCHLORIDE 25 MG/1
25 SUPPOSITORY RECTAL ONCE AS NEEDED
Status: COMPLETED | OUTPATIENT
Start: 2018-11-08 | End: 2018-11-08

## 2018-11-08 RX ORDER — OXYCODONE AND ACETAMINOPHEN 7.5; 325 MG/1; MG/1
1 TABLET ORAL ONCE AS NEEDED
Status: DISCONTINUED | OUTPATIENT
Start: 2018-11-08 | End: 2018-11-08 | Stop reason: HOSPADM

## 2018-11-08 RX ORDER — PROMETHAZINE HYDROCHLORIDE 25 MG/1
25 TABLET ORAL ONCE AS NEEDED
Status: COMPLETED | OUTPATIENT
Start: 2018-11-08 | End: 2018-11-08

## 2018-11-08 RX ORDER — ONDANSETRON 4 MG/1
4 TABLET, ORALLY DISINTEGRATING ORAL EVERY 6 HOURS PRN
Status: DISCONTINUED | OUTPATIENT
Start: 2018-11-08 | End: 2018-11-09 | Stop reason: HOSPADM

## 2018-11-08 RX ORDER — OXYCODONE AND ACETAMINOPHEN 7.5; 325 MG/1; MG/1
2 TABLET ORAL EVERY 4 HOURS PRN
Status: DISCONTINUED | OUTPATIENT
Start: 2018-11-08 | End: 2018-11-08

## 2018-11-08 RX ORDER — CHLORHEXIDINE GLUCONATE 500 MG/1
1 CLOTH TOPICAL 3 TIMES DAILY
COMMUNITY
End: 2018-11-09 | Stop reason: HOSPADM

## 2018-11-08 RX ORDER — SODIUM CHLORIDE, SODIUM LACTATE, POTASSIUM CHLORIDE, CALCIUM CHLORIDE 600; 310; 30; 20 MG/100ML; MG/100ML; MG/100ML; MG/100ML
9 INJECTION, SOLUTION INTRAVENOUS CONTINUOUS
Status: DISCONTINUED | OUTPATIENT
Start: 2018-11-08 | End: 2018-11-08 | Stop reason: HOSPADM

## 2018-11-08 RX ORDER — GUAIFENESIN 600 MG/1
1200 TABLET, EXTENDED RELEASE ORAL DAILY PRN
Status: DISCONTINUED | OUTPATIENT
Start: 2018-11-08 | End: 2018-11-09 | Stop reason: HOSPADM

## 2018-11-08 RX ORDER — HYDROCODONE BITARTRATE AND ACETAMINOPHEN 7.5; 325 MG/1; MG/1
1 TABLET ORAL ONCE AS NEEDED
Status: DISCONTINUED | OUTPATIENT
Start: 2018-11-08 | End: 2018-11-08 | Stop reason: HOSPADM

## 2018-11-08 RX ORDER — FENTANYL CITRATE 50 UG/ML
INJECTION, SOLUTION INTRAMUSCULAR; INTRAVENOUS AS NEEDED
Status: DISCONTINUED | OUTPATIENT
Start: 2018-11-08 | End: 2018-11-08 | Stop reason: SURG

## 2018-11-08 RX ORDER — PROMETHAZINE HYDROCHLORIDE 25 MG/1
12.5 TABLET ORAL ONCE AS NEEDED
Status: DISCONTINUED | OUTPATIENT
Start: 2018-11-08 | End: 2018-11-08 | Stop reason: HOSPADM

## 2018-11-08 RX ORDER — ROCURONIUM BROMIDE 10 MG/ML
INJECTION, SOLUTION INTRAVENOUS AS NEEDED
Status: DISCONTINUED | OUTPATIENT
Start: 2018-11-08 | End: 2018-11-08 | Stop reason: SURG

## 2018-11-08 RX ORDER — HYDROMORPHONE HYDROCHLORIDE 1 MG/ML
0.5 INJECTION, SOLUTION INTRAMUSCULAR; INTRAVENOUS; SUBCUTANEOUS
Status: DISCONTINUED | OUTPATIENT
Start: 2018-11-08 | End: 2018-11-08 | Stop reason: HOSPADM

## 2018-11-08 RX ADMIN — MIDAZOLAM 1 MG: 1 INJECTION INTRAMUSCULAR; INTRAVENOUS at 09:00

## 2018-11-08 RX ADMIN — PROMETHAZINE HYDROCHLORIDE 12.5 MG: 25 INJECTION INTRAMUSCULAR; INTRAVENOUS at 12:44

## 2018-11-08 RX ADMIN — FENTANYL CITRATE 50 MCG: 50 INJECTION, SOLUTION INTRAMUSCULAR; INTRAVENOUS at 12:26

## 2018-11-08 RX ADMIN — HYDROMORPHONE HYDROCHLORIDE 0.5 MG: 1 INJECTION, SOLUTION INTRAMUSCULAR; INTRAVENOUS; SUBCUTANEOUS at 20:14

## 2018-11-08 RX ADMIN — MUPIROCIN 10 APPLICATION: 20 OINTMENT TOPICAL at 20:10

## 2018-11-08 RX ADMIN — HYDROCODONE BITARTRATE AND ACETAMINOPHEN 1 TABLET: 7.5; 325 TABLET ORAL at 17:50

## 2018-11-08 RX ADMIN — ONDANSETRON 4 MG: 2 INJECTION INTRAMUSCULAR; INTRAVENOUS at 11:19

## 2018-11-08 RX ADMIN — MIDAZOLAM 2 MG: 1 INJECTION INTRAMUSCULAR; INTRAVENOUS at 09:17

## 2018-11-08 RX ADMIN — CEFAZOLIN SODIUM 2 G: 2 INJECTION, SOLUTION INTRAVENOUS at 16:30

## 2018-11-08 RX ADMIN — PROPOFOL 200 MG: 10 INJECTION, EMULSION INTRAVENOUS at 09:47

## 2018-11-08 RX ADMIN — PREGABALIN 150 MG: 75 CAPSULE ORAL at 08:56

## 2018-11-08 RX ADMIN — FENTANYL CITRATE 50 MCG: 50 INJECTION, SOLUTION INTRAMUSCULAR; INTRAVENOUS at 09:17

## 2018-11-08 RX ADMIN — VANCOMYCIN HYDROCHLORIDE 1500 MG: 10 INJECTION, POWDER, LYOPHILIZED, FOR SOLUTION INTRAVENOUS at 20:10

## 2018-11-08 RX ADMIN — ROPIVACAINE HYDROCHLORIDE 30 ML: 5 INJECTION, SOLUTION EPIDURAL; INFILTRATION; PERINEURAL at 09:24

## 2018-11-08 RX ADMIN — HYDROCODONE BITARTRATE AND ACETAMINOPHEN 1 TABLET: 7.5; 325 TABLET ORAL at 16:55

## 2018-11-08 RX ADMIN — FENTANYL CITRATE 50 MCG: 50 INJECTION INTRAMUSCULAR; INTRAVENOUS at 09:50

## 2018-11-08 RX ADMIN — LIDOCAINE HYDROCHLORIDE 100 MG: 20 INJECTION, SOLUTION INFILTRATION; PERINEURAL at 09:47

## 2018-11-08 RX ADMIN — ROCURONIUM BROMIDE 40 MG: 10 INJECTION INTRAVENOUS at 09:47

## 2018-11-08 RX ADMIN — TRANEXAMIC ACID 1000 MG: 100 INJECTION, SOLUTION INTRAVENOUS at 11:09

## 2018-11-08 RX ADMIN — CEFAZOLIN SODIUM 2 G: 2 INJECTION, SOLUTION INTRAVENOUS at 09:50

## 2018-11-08 RX ADMIN — TRANEXAMIC ACID 1000 MG: 100 INJECTION, SOLUTION INTRAVENOUS at 10:06

## 2018-11-08 RX ADMIN — SODIUM CHLORIDE, POTASSIUM CHLORIDE, SODIUM LACTATE AND CALCIUM CHLORIDE 100 ML/HR: 600; 310; 30; 20 INJECTION, SOLUTION INTRAVENOUS at 15:45

## 2018-11-08 RX ADMIN — HYDROMORPHONE HYDROCHLORIDE 0.5 MG: 2 INJECTION INTRAMUSCULAR; INTRAVENOUS; SUBCUTANEOUS at 11:40

## 2018-11-08 RX ADMIN — SODIUM CHLORIDE, POTASSIUM CHLORIDE, SODIUM LACTATE AND CALCIUM CHLORIDE 500 ML: 600; 310; 30; 20 INJECTION, SOLUTION INTRAVENOUS at 08:57

## 2018-11-08 RX ADMIN — VANCOMYCIN HYDROCHLORIDE 1500 MG: 10 INJECTION, POWDER, LYOPHILIZED, FOR SOLUTION INTRAVENOUS at 08:56

## 2018-11-08 RX ADMIN — FENTANYL CITRATE 50 MCG: 50 INJECTION, SOLUTION INTRAMUSCULAR; INTRAVENOUS at 12:18

## 2018-11-08 RX ADMIN — PANTOPRAZOLE SODIUM 40 MG: 40 TABLET, DELAYED RELEASE ORAL at 20:10

## 2018-11-08 RX ADMIN — ONDANSETRON 4 MG: 2 INJECTION INTRAMUSCULAR; INTRAVENOUS at 12:18

## 2018-11-08 RX ADMIN — FAMOTIDINE 20 MG: 10 INJECTION, SOLUTION INTRAVENOUS at 08:19

## 2018-11-08 RX ADMIN — SODIUM CHLORIDE, POTASSIUM CHLORIDE, SODIUM LACTATE AND CALCIUM CHLORIDE 9 ML/HR: 600; 310; 30; 20 INJECTION, SOLUTION INTRAVENOUS at 08:19

## 2018-11-08 RX ADMIN — MELOXICAM 15 MG: 15 TABLET ORAL at 08:55

## 2018-11-08 RX ADMIN — SODIUM CHLORIDE, POTASSIUM CHLORIDE, SODIUM LACTATE AND CALCIUM CHLORIDE: 600; 310; 30; 20 INJECTION, SOLUTION INTRAVENOUS at 10:10

## 2018-11-08 RX ADMIN — DOCUSATE SODIUM 100 MG: 100 CAPSULE, LIQUID FILLED ORAL at 20:10

## 2018-11-08 RX ADMIN — HYDROMORPHONE HYDROCHLORIDE 0.5 MG: 2 INJECTION INTRAMUSCULAR; INTRAVENOUS; SUBCUTANEOUS at 10:07

## 2018-11-08 RX ADMIN — HYDROCODONE BITARTRATE AND ACETAMINOPHEN 1 TABLET: 7.5; 325 TABLET ORAL at 21:54

## 2018-11-08 RX ADMIN — FENTANYL CITRATE 100 MCG: 50 INJECTION INTRAMUSCULAR; INTRAVENOUS at 09:47

## 2018-11-08 RX ADMIN — HYDROMORPHONE HYDROCHLORIDE 0.5 MG: 1 INJECTION, SOLUTION INTRAMUSCULAR; INTRAVENOUS; SUBCUTANEOUS at 12:50

## 2018-11-08 RX ADMIN — ACETAMINOPHEN 1000 MG: 500 TABLET, FILM COATED ORAL at 08:55

## 2018-11-08 RX ADMIN — SUGAMMADEX 200 MG: 100 INJECTION, SOLUTION INTRAVENOUS at 11:32

## 2018-11-08 NOTE — BRIEF OP NOTE
TOTAL KNEE ARTHROPLASTY  Progress Note    Bob Biggs Sr.  11/8/2018    Pre-op Diagnosis:   Chronic pain of left knee [M25.562, G89.29]       Post-Op Diagnosis Codes:     * Chronic pain of left knee [M25.562, G89.29]    Procedure/CPT® Codes:      Procedure(s):  TOTAL KNEE ARTHROPLASTY    Surgeon(s):  Nato Guerin MD    Anesthesia: General    Staff:   Circulator: Evette Srinivasan RN; Emma Calix RN  Scrub Person: Duc Gamez  Vendor Representative: Ronald Smith  Assistant: Augusto Starks CSA    Estimated Blood Loss: minimal    Urine Voided: * No values recorded between 11/8/2018  9:33 AM and 11/8/2018 11:38 AM *    Specimens:                None      Drains:      Findings: see dictation    Complications: none      Nato Guerin MD     Date: 11/8/2018  Time: 11:38 AM

## 2018-11-08 NOTE — H&P
Patient: Bob Biggs     YOB: 1955     Medical Record Number: [id number]     Chief Complaints: Left knee endstage osteoarthritis     History of Present Illness: 63 y.o. male presents today in anticipation of upcoming knee replacement surgery.  Patient has a long history of worsening symptoms.  Describes the pain as severe, constant, and typically dull and achy.  Patient has tried and failed prolonged conservative treatment.  Patient is struggling with routine daily activities and this has become a significant issue for overall quality of life.  Patient cannot walk any prolonged distances without having to rest.  Patient presents today in anticipation of a total knee arthroplasty      Allergies:        Allergies   Allergen Reactions   • No Known Drug Allergy           Medications:   Home Medications:     Current Outpatient Prescriptions:   •  acetaminophen (TYLENOL) 650 MG 8 hr tablet, Take 650 mg by mouth Every 8 (Eight) Hours As Needed for Mild Pain ., Disp: , Rfl:   •  betamethasone dipropionate (DIPROLENE) 0.05 % cream, Apply  topically 2 (Two) Times a Day. (Patient taking differently: Apply  topically to the appropriate area as directed As Needed.), Disp: 15 g, Rfl: 0  •  cephalexin (KEFLEX) 500 MG capsule, Take 1 capsule by mouth 3 (Three) Times a Day for 10 days., Disp: 30 capsule, Rfl: 0  •  cetirizine (zyrTEC) 10 MG tablet, Take 10 mg by mouth Daily., Disp: , Rfl:   •  Glucos-MSM-C-Js-Okfwdq-Ftzkyy (GLUCOSAMINE MSM COMPLEX) tablet, Take 1,500 mg by mouth Every Night. HOLD FOR SURGERY, Disp: , Rfl:   •  glucose blood test strip, Patient tests two times a day, Disp: 100 each, Rfl: 12  •  metFORMIN ER (GLUCOPHAGE-XR) 500 MG 24 hr tablet, Take 1 tablet by mouth Daily With Breakfast., Disp: 90 tablet, Rfl: 3  •  Multiple Vitamins-Minerals (CENTRUM SILVER ADULT 50+) tablet, Take  by mouth Daily. HOLD FOR SURGERY, Disp: , Rfl:   •  ONE TOUCH LANCETS misc, 1 each 2 (Two) Times a Day., Disp: 200  "each, Rfl: 1  •  pantoprazole (PROTONIX) 40 MG EC tablet, Take 40 mg by mouth 2 (Two) Times a Day., Disp: , Rfl:   •  valACYclovir (VALTREX) 500 MG tablet, TAKE 1 TABLET EVERY DAY, Disp: 90 tablet, Rfl: 2  •  guaiFENesin (MUCINEX) 600 MG 12 hr tablet, Take 1,200 mg by mouth As Needed for Cough., Disp: , Rfl:   •  pantoprazole (PROTONIX) 40 MG EC tablet, TAKE 1 TABLET BY MOUTH TWICE DAILY, Disp: 180 tablet, Rfl: 1     Medical History        Past Medical History:   Diagnosis Date   • Arthritis     • Diabetes mellitus (CMS/HCC)     • GERD (gastroesophageal reflux disease)     • Hypertension     • Painful swelling of joint     • Tear of meniscus of knee                 Surgical History         Past Surgical History:   Procedure Laterality Date   • COLONOSCOPY   2008   • HYDROCELECTOMY         late 1990s   • KNEE ARTHROSCOPY Left 2007, 2011   • KNEE SURGERY   03/03/2016     Right TKA               Social History          Occupational History   • Not on file.             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  Pertinent positives are listed above.  All others are negative.     Physical Exam: 63 y.o. male     Vitals       Vitals:     09/24/18 0843   Temp: 99.6 °F (37.6 °C)   Weight: 90.4 kg (199 lb 6.4 oz)   Height: 177.8 cm (70\")    "      General:  Patient is awake and alert.  Appears in no acute distress or discomfort.     Psych:  Affect and demeanor are appropriate.     Eyes:  Conjunctiva and sclera appear grossly normal.  Eyes track well and EOM seem to be intact.     Ears:  No gross abnormalities.  Hearing adequate for the exam.     Cardiovascular:  Regular rate and rhythm.     Lungs:  Good chest expansion.  Breathing unlabored.     Lymph:  No palpable adenopathy about neck or axilla.     Left lower extremity:  Skin benign and intact without evidence for swelling, masses.  There is a well-healed scar over the medial calf where he has some atrophy.  No palpable masses.  He has obvious varus alignment.  Focal tenderness noted over the medial joint line but he also has tenderness laterally and anteriorly.  ROM is from 5° shy of full extension to 110° of flexion.  Knee is stable on exam.  Good strength throughout the lower leg and foot.  Intact sensation throughout.  Palpable pedal pulses with brisk cap refill.     Diagnostic Tests:        Lab Results   Component Value Date     GLUCOSE 147 (H) 09/20/2018     CALCIUM 9.3 09/20/2018      09/20/2018     K 4.5 09/20/2018     CO2 26.8 09/20/2018      09/20/2018     BUN 17 09/20/2018     CREATININE 0.91 09/20/2018     EGFRIFAFRI 102 09/20/2018     EGFRIFNONA 93 09/21/2017     BCR 18.7 09/20/2018     ANIONGAP 10.2 09/20/2018            Lab Results   Component Value Date     WBC 7.15 09/20/2018     HGB 13.8 09/20/2018     HCT 42.1 09/20/2018     MCV 92.1 09/20/2018      09/20/2018      No results found for: INR, PROTIME     Imaging:  Previous x-rays of the knee are reviewed.  The studies show severe diffuse tricompartmental osteoarthritis with varus alignment.     Assessment:  Left knee endstage osteoarthritis     Plan: We will plan on proceeding with a left total knee arthroplasty at the patient's request.  I reviewed details of procedure with patient today and discussed all the  risks, benefits, alternatives, and limitations of the procedure in laymen's terms with the risks including but not limited to:  neurovascular damage resulting in permanent dysfunction or footdrop and potential need for further surgery, bleeding, infection, hematoma, chronic pain, worsening of pain, persistent symptoms potentially necessitating revision, prosthesis related problems including loosening or allergy, swelling, loss of motion and arthrofibrosis, weakness, stiffness, instability, DVT, pulmonary embolus, death, stroke, complex regional pain syndrome, and need for additional procedures.  Patient verbalized understanding, and was given the opportunity to ask and have all questions answered today.  No guarantees were given regarding results of surgery.

## 2018-11-08 NOTE — OP NOTE
Orthopedic Operative Note      Facility: [facility]      Patient: Bob Biggs Sr.     Medical Record Number: [id number]     YOB: 1955     Dictating Surgeon: Nato Guerin M.D.     Primary Care Physician: Delta Mitchell MD     Date of Operation: 11/8/2018   ________________________________________________________________________      PREOPERATIVE DIAGNOSIS: Left knee endstage osteoarthritis      POSTOPERATIVE DIAGNOSIS:  Left knee endstage osteoarthritis      PROCEDURE PERFORMED: TOTAL KNEE ARTHROPLASTY      SURGEON: Nato Guerin MD       ASSISTANT: Augusto Starks      ANESTHESIA:    1. Regional followed general.    2. Local administration of Exparel solution      COMPLICATION: None.       ESTIMATED BLOOD LOSS: Less than 100 mL.       Specimens:   Order Name Source Comment Collection Info Order Time   URINALYSIS W/ MICROSCOPIC IF INDICATED (NO CULTURE)   Collected By: Tabitha Mojica RN 11/8/2018  7:42 AM   BASIC METABOLIC PANEL   Collected By: Tabitha Mojica RN 11/8/2018  7:42 AM   TYPE AND SCREEN   Collected By: Tabitha Mojica RN 11/8/2018  7:51 AM         TOURNIQUET TIME: 85 minutes.       IMPLANTS:    1. Smith and Nephew size 6  PS Legion Oxinium femoral   component.    2. Ling and Nephew size 6 Isabel II tibial baseplate.    3. Size 10 mm  PS polyethylene insert.    3. Size 35 by 7.5 mm patellar component.       INDICATIONS: The patient is a 63 y.o. male with a long history of   worsening left knee osteoarthritis.  The patient had tried and failed prolonged conservative treatment.   Following a thorough discussion of all options including both conservative and operative options,   the patient elected to proceed with a total knee arthroplasty.      INFORMED CONSENT: The risks, benefits, and alternatives to a total knee arthroplasty   were discussed with patient in detail. I explained that surgical risks include infection, hematoma,   hardware related  complications including failure of fixation, loosening, fracture, persistent pain   and/or loss of motion, post-operative arthrofibrosis, iatrogenic nerve and/or blood vessel injury   resulting in permanent weakness, numbness or dysfunction, DVT, PE, positioning related   neuropraxia, and anesthesia related complications resulting in death.    Patient did acknowledge   understanding the information and consented to proceed.         DESCRIPTION OF PROCEDURE: The patient and operative site were identified in the   preoperative holding area. The surgical site was marked. Following this,   adequate regional anesthesia of the operative extremity was administered. The   patient was then taken to the operating room and placed in the supine position.   Adequate general anesthesia was administered and then patient was repositioned on   the operating table in the supine position. A timeout was taken and preoperative   antibiotics administered.      The extremity was cleaned and prepped in the standard sterile fashion. I cleaned   the extremity with an alcohol solution. A Hibiclens scrub was performed and   then the extremity was prepped with 2 ChloraPreps. I allowed this to dry for 3   minutes before the draping procedure was carried out. The extremity was   exsanguinated with an Esmarch bandage and the tourniquet insufflated to 250   mmHg. Again, a timeout had been taken and preoperative antibiotics administered   prior to surgical incision.     An approximately 10 cm incision was fashioned over   the anterior aspect of the knee. This was carried down through the skin and   subcutaneous tissues. Full-thickness medial and lateral skin flaps were   developed. The underlying extensor mechanism was exposed. A standard medial   deborah-patellar arthrotomy was performed and the joint was entered. The   infrapatellar fat pad was removed. The anterior horns of the medial and lateral   menisci were released along with the ACL. The joint  was examined. The joint demonstrated   extensive arthrosis involving all 3 compartments. The medial and patellofemoral compartments   demonstrated extensive wear and this matched up with the preoperative imaging and   the deformity noted on exam.  I had difficulty getting the femur exposed due to the large   peripheral osteophytes on the patella and these were removed at this point.      An opening was created in the distal femur and the intramedullary   alignment alejandro for the distal femoral cutting guide was inserted. The 5 degree valgus   guide was pinned into position and then the distal femoral cut carried out in   typical fashion. The cut portion of the bone was removed followed by the cutting   guide. The posterior referencing guide was then inserted and I then measured the   distal femur. It measured a 6. The 6 cutting block was pinned into position and   then the anterior posterior and chamfer cuts were carried out in typical   fashion. The cut portions of the bone were removed. The cuts seemed to match up   very nicely with the cortex. Some of the peripheral osteophytes were removed   at this point. I then directed my attention to the tibia.        The cutting guide for the tibia was positioned. This was centered at the knee and   ankle. I placed the alignment alejandro parallel to the anterior face of the tibia. I   made sure to measure roughly 9 mm off the high or lateral side and 2 mm off   the low or medial side. I checked the cuts several times to make sure the   alignment was appropriate and then pinned the guide into position.  I again   checked the cuts and positioned retractors appropriately to allow for   protection of the neurovascular structures. An oscillating saw was used to carry   out the tibial cut in typical fashion, taking care not to penetrate posteriorly so   as to prevent iatrogenic injury to the neurovascular structures. The cut portion of   the bone was removed and then I examined the cut.  I checked the flexion and   extension gaps. They seemed to be well-balanced. The medial and lateral menisci   were removed. The posterior capsule was carefully infiltrated with some of the   Exparel solution, taking care to aspirate before injecting.     Next, I directed my attention to the trialing process.  I trialed with a 6   femur and 6 tibia.  These seemed to fit well and restored good motion and stability   with an 11 trial polyethylene but the knee was a slightly too tight in both flexion and extension.    I trialed with the 9 but this was slightly too loose.  I left the 11 in place but determined to place a 10 implant.   When the tibial and femoral preparations were   completed, I had my assistant mix 2 batches of Pal[place]os bone cement using current   generation cement mixing technique. While my assistant was mixing the cement, I   prepared the patella.        This structure demonstrated significant arthrosis and I feel that resurfacing was necessary.     The patella was reamed and prepared in typical fashion. I trialed   with the 35 mm trial patellar component. The patella seemed to track well. I   did not feel that a lateral release was necessary in this case. The trial components were removed.  Once the    cement was prepared, the components were impacted into position. The extruded   cement was carefully removed with a [place] elevator. The knee was left in full   extension with the 11 trial polyethylene in position. I also kept the patella   clamped until the cement had fully cured.     Once the cement had cured, I again trialed the knee with the trial implant in place.     Again, it determined that the 10 implant should allow excellent motion and stability.   At this point,     the trial polyethylene was removed and the final component impacted   into position. I took care to make sure the medial and lateral dovetails were   fully interdigitated and that the polyethylene was well-seated before again    carrying the knee through a range of motion. The knee demonstrated   excellent motion and stability. Again, the patella was noted to track well.  The   knee demonstrated full extension and flexion past 130 degrees.  The fllexion and   extension gaps seemed to be well-balanced. The knee was irrigated out with 500   mL of a Betadine-containing saline solution followed by 3L of sterile saline   mixed with bacitracin via pulsatile lavage. I infiltrated the periarticular   soft tissues with the remaining Exparel solution. The leg was placed in about   60' of flexion and then the extensor mechanism repaired using multiple 0 Vicryl   sutures, interrupted Vicryl was used to repair the subcutaneous, and then a   running subcuticular Monocryl stitch was used to close the skin followed by   Dermabond.     Sterile dressings were applied to the wound and the drapes were   withdrawn. The patient tolerated the procedure well. There were no   complications. The patient was taken to the recovery room in good condition.         Nato Guerin M.D.      Date: 11/8/2018     cc: Delta Mitchell MD

## 2018-11-08 NOTE — PLAN OF CARE
Problem: Patient Care Overview  Goal: Plan of Care Review  Outcome: Ongoing (interventions implemented as appropriate)   11/08/18 7421   OTHER   Outcome Summary s/p L total knee. up in chair post op. up with stand by asst and walker in room. pain control w/lortab. voiding w/o issue. VSS. family at bedside--DURGA Cooper RN   Coping/Psychosocial   Plan of Care Reviewed With patient   Plan of Care Review   Progress improving     Goal: Individualization and Mutuality  Outcome: Ongoing (interventions implemented as appropriate)    Goal: Discharge Needs Assessment  Outcome: Ongoing (interventions implemented as appropriate)    Goal: Interprofessional Rounds/Family Conf  Outcome: Ongoing (interventions implemented as appropriate)      Problem: Fall Risk (Adult)  Goal: Identify Related Risk Factors and Signs and Symptoms  Outcome: Ongoing (interventions implemented as appropriate)    Goal: Absence of Fall  Outcome: Ongoing (interventions implemented as appropriate)      Problem: Knee Arthroplasty (Total, Partial) (Adult)  Goal: Signs and Symptoms of Listed Potential Problems Will be Absent, Minimized or Managed (Knee Arthroplasty)  Outcome: Ongoing (interventions implemented as appropriate)

## 2018-11-08 NOTE — ANESTHESIA POSTPROCEDURE EVALUATION
"Patient: Bob Biggs Sr.    Procedure Summary     Date:  11/08/18 Room / Location:  [facility] OR [address] [place] [address] MAIN OR    Anesthesia Start:  0946 Anesthesia Stop:  1155    Procedure:  TOTAL KNEE ARTHROPLASTY (Left Knee) Diagnosis:       Chronic pain of left knee      (Chronic pain of left knee [M25.562, G89.29])    Surgeon:  Nato Guerin MD Provider:  Baltazar Newton MD    Anesthesia Type:  general ASA Status:  2          Anesthesia Type: general  Last vitals  BP   119/78 (11/08/18 1310)   Temp   36.6 °C (97.8 °F) (11/08/18 1151)   Pulse   76 (11/08/18 1310)   Resp   16 (11/08/18 1310)     SpO2   96 % (11/08/18 1310)     Post Anesthesia Care and Evaluation    Patient location during evaluation: PACU  Patient participation: complete - patient participated  Level of consciousness: awake and alert  Pain management: adequate  Airway patency: patent  Anesthetic complications: No anesthetic complications    Cardiovascular status: acceptable  Respiratory status: acceptable  Hydration status: acceptable    Comments: /78   Pulse 76   Temp 36.6 °C (97.8 °F) (Oral)   Resp 16   Ht 179.1 cm (70.5\")   Wt 91.8 kg (202 lb 5 oz)   SpO2 96%   BMI 28.62 kg/m²         "

## 2018-11-08 NOTE — ANESTHESIA PREPROCEDURE EVALUATION
Anesthesia Evaluation     no history of anesthetic complications:  NPO Solid Status: > 8 hours  NPO Liquid Status: > 2 hours           Airway   Mallampati: II  TM distance: >3 FB  Neck ROM: full  Possible difficult intubation  Dental - normal exam     Pulmonary    (-) COPD, asthma, sleep apnea, rhonchi, decreased breath sounds, wheezes, not a smoker  Cardiovascular   Exercise tolerance: poor (<4 METS)    Rhythm: regular  Rate: normal    (-) valvular problems/murmurs, past MI, CAD, dysrhythmias, murmur, cardiac stents, hyperlipidemia      Neuro/Psych  (-) seizures, CVA  GI/Hepatic/Renal/Endo    (+)  GERD well controlled,  diabetes mellitus type 2 poorly controlled,   (-) hepatitis, liver disease, no renal disease, hypothyroidism, hyperthyroidism    Musculoskeletal     (+) joint swelling,   Abdominal     Abdomen: soft.   Substance History      OB/GYN          Other   (+) arthritis                   Anesthesia Plan    ASA 2     general   (Adductor canal )  intravenous induction   Anesthetic plan, all risks, benefits, and alternatives have been provided, discussed and informed consent has been obtained with: patient.

## 2018-11-08 NOTE — ANESTHESIA PROCEDURE NOTES
Airway  Urgency: elective    Date/Time: 11/8/2018 10:04 AM  End Time:11/8/2018 10:04 AM  Airway not difficult    General Information and Staff    Patient location during procedure: OR  Anesthesiologist: YOAN WOLFF  CRNA: FLETCHER CASTRO    Indications and Patient Condition  Indications for airway management: airway protection    Preoxygenated: yes  MILS maintained throughout  Mask difficulty assessment: 1 - vent by mask    Final Airway Details  Final airway type: endotracheal airway      Successful airway: ETT  Cuffed: yes   Successful intubation technique: direct laryngoscopy  Endotracheal tube insertion site: oral  Blade: Smith  Blade size: 2  ETT size: 8.0 mm  Cormack-Lehane Classification: grade I - full view of glottis  Placement verified by: chest auscultation and capnometry   Measured from: lips  Number of attempts at approach: 1    Additional Comments  Atraumatic, Secured after verification of placement

## 2018-11-08 NOTE — PLAN OF CARE
Problem: Patient Care Overview  Goal: Plan of Care Review   11/08/18 1501   OTHER   Outcome Summary Pt POD0 L TKA. Previously independent. R TKA 2 years ago. Has necessary equipment at home. Upon eval, decreased L knee ROM/strength as expected. Will see pt to improve mobility and independence. Anticipate d/c home with [facility] PT when medically able. Will progress towards goals as tolerated.

## 2018-11-08 NOTE — THERAPY EVALUATION
Acute Care - Physical Therapy Initial Evaluation  [facility]     Patient Name: Bob Biggs Sr.  : 1955  MRN: [id number]  Today's Date: 2018         Referring Physician: Apoorva      Admit Date: 2018    Visit Dx:     ICD-10-CM ICD-9-CM   1. Decreased mobility R26.89 781.99     Patient Active Problem List   Diagnosis   • Osteoarthritis of right knee   • Status post total right knee replacement   • Diabetes (CMS/HCC)   • Arthritis   • Arthritis   • GERD (gastroesophageal reflux disease)   • Chronic pain of left knee   • Osteoarthritis of left knee     Past Medical History:   Diagnosis Date   • Arthritis    • Diabetes mellitus (CMS/HCC)    • Finger fracture, left 2018   • GERD (gastroesophageal reflux disease)    • Painful swelling of joint    • Tear of meniscus of knee      Past Surgical History:   Procedure Laterality Date   • COLONOSCOPY     • FINGER/THUMB CLOSED REDUCTION AND PERCUTANEOUS PINNING Left 2018    Procedure: CLOSED REDUCTION AND LT. SMALL PERCUTANEOUS FINGER PINNING;  Surgeon: Raghu Porras MD;  Location: [facility];  Service: Hand   • HYDROCELECTOMY      late    • KNEE ARTHROSCOPY Left ,    • KNEE SURGERY  2016    Right TKA        PT ASSESSMENT (last 12 hours)      Physical Therapy Evaluation     Row Name 18 1436          PT Evaluation Time/Intention    Subjective Information complains of;fatigue;pain  -MA     Document Type evaluation;therapy note (daily note)  -MA     Mode of Treatment physical therapy;individual therapy  -MA     Patient Effort good  -MA     Symptoms Noted During/After Treatment fatigue;increased pain  -MA     Row Name 18 1436          General Information    Patient Profile Reviewed? yes  -MA     Referring Physician Apoorva  -MA     Patient Observations alert;cooperative;agree to therapy  -MA     Patient/Family Observations supine in bed, no acute distress, wife in room   -MA     Prior Level of Function  independent:  -MA     Equipment Currently Used at Home none   has Rwx  -MA     Pertinent History of Current Functional Problem POD0 L TKA  -MA     Existing Precautions/Restrictions fall  -MA     Barriers to Rehab none identified  -MA     Row Name 11/08/18 1436          Relationship/Environment    Lives With spouse  -MA     Row Name 11/08/18 1436          Cognitive Assessment/Interventions    Additional Documentation Cognitive Assessment/Intervention (Group)  -MA     Row Name 11/08/18 1436          Cognitive Assessment/Intervention- PT/OT    Affect/Mental Status (Cognitive) WNL  -MA     Orientation Status (Cognition) oriented x 4  -MA     Follows Commands (Cognition) WNL  -MA     Personal Safety Interventions fall prevention program maintained;gait belt;muscle strengthening facilitated;nonskid shoes/slippers when out of bed;supervised activity  -MA     Row Name 11/08/18 1436          Safety Issues, Functional Mobility    Impairments Affecting Function (Mobility) balance;endurance/activity tolerance;pain;range of motion (ROM);strength  -MA     Row Name 11/08/18 1436          Bed Mobility Assessment/Treatment    Bed Mobility Assessment/Treatment supine-sit  -MA     Supine-Sit [place] (Bed Mobility) supervision  -MA     Assistive Device (Bed Mobility) bed rails;head of bed elevated  -MA     Row Name 11/08/18 1436          Transfer Assessment/Treatment    Transfer Assessment/Treatment sit-stand transfer;stand-sit transfer  -MA     Sit-Stand [place] (Transfers) contact guard  -MA     Stand-Sit [place] (Transfers) contact guard  -MA     Row Name 11/08/18 1436          Sit-Stand Transfer    Assistive Device (Sit-Stand Transfers) walker, front-wheeled  -MA     Row Name 11/08/18 1436          Stand-Sit Transfer    Assistive Device (Stand-Sit Transfers) walker, front-wheeled  -MA     Row Name 11/08/18 1436          Gait/Stairs Assessment/Training    [place] Level (Gait) contact guard  -MA     Assistive  Device (Gait) walker, front-wheeled  -MA     Distance in Feet (Gait) 20  -MA     Pattern (Gait) step-through  -MA     Deviations/Abnormal Patterns (Gait) left sided deviations;antalgic;base of support, wide;kd decreased;stride length decreased  -MA     Left Sided Gait Deviations heel strike decreased;weight shift ability decreased  -MA     Comment (Gait/Stairs) cues for sequencing   -MA     Row Name 11/08/18 1436          General ROM    GENERAL ROM COMMENTS L knee grossly -5-80deg  -MA     Row Name 11/08/18 1436          MMT (Manual Muscle Testing)    General MMT Comments L knee post op weakness   -MA     Row Name 11/08/18 1436          Motor Assessment/Intervention    Additional Documentation Therapeutic Exercise Interventions (Group)  -MA     Row Name 11/08/18 1436          Therapeutic Exercise    Comment (Therapeutic Exercise) L knee post op protocol x10   -MA     Row Name 11/08/18 1436          Pain Assessment    Additional Documentation Pain Scale: Numbers Pre/Post-Treatment (Group);Pain Scale: Word Pre/Post-Treatment (Group)  -MA     Row Name 11/08/18 1436          Pain Scale: Word Pre/Post-Treatment    Pain: Word Scale, Pretreatment 2 - mild pain  -MA     Pain: Word Scale, Post-Treatment 2 - mild pain  -MA     Pre/Post Treatment Pain Comment L knee   -MA     Row Name             Wound 11/08/18 1036 Left leg incision    Wound - Properties Group Date first assessed: 11/08/18  -MP Time first assessed: 1036  -MP Side: Left  -MP Location: leg  -MP Type: incision  -MP    Row Name 11/08/18 1436          Physical Therapy Clinical Impression    Patient/Family Goals Statement (PT Clinical Impression) return home  -MA     Criteria for Skilled Interventions Met (PT Clinical Impression) yes;treatment indicated  -MA     Pathology/Pathophysiology Noted (Describe Specifically for Each System) musculoskeletal  -MA     Impairments Found (describe specific impairments) aerobic capacity/endurance;gait, locomotion, and  balance;muscle performance;ROM  -MA     Rehab Potential (PT Clinical Summary) good, to achieve stated therapy goals  -MA     Row Name 11/08/18 1436          Vital Signs    O2 Delivery Pre Treatment supplemental O2  -MA     O2 Delivery Intra Treatment supplemental O2  -MA     Post SpO2 (%) 95  -MA     O2 Delivery Post Treatment supplemental O2  -MA     Row Name 11/08/18 1436          Physical Therapy Goals    Transfer Goal Selection (PT) transfer, PT goal 1  -MA     Gait Training Goal Selection (PT) gait training, PT goal 1  -MA     Row Name 11/08/18 1436          Transfer Goal 1 (PT)    Activity/Assistive Device (Transfer Goal 1, PT) sit-to-stand/stand-to-sit;bed-to-chair/chair-to-bed;walker, rolling  -MA     [place] Level/Cues Needed (Transfer Goal 1, PT) conditional independence  -MA     Time Frame (Transfer Goal 1, PT) 3 days  -MA     Row Name 11/08/18 1436          Gait Training Goal 1 (PT)    Activity/Assistive Device (Gait Training Goal 1, PT) gait (walking locomotion);walker, rolling  -MA     [place] Level (Gait Training Goal 1, PT) conditional independence  -MA     Distance (Gait Goal 1, PT) 100  -MA     Time Frame (Gait Training Goal 1, PT) 3 days  -MA     Row Name 11/08/18 1436          Patient Education Goal (PT)    Activity (Patient Education Goal, PT) independent with HEP   -MA     Row Name 11/08/18 1436          Positioning and Restraints    Pre-Treatment Position in bed  -MA     Post Treatment Position chair  -MA     In Chair reclined;call light within reach;encouraged to call for assist;exit alarm on;with family/caregiver;notified nsg  -MA     Row Name 11/08/18 1436          Living Environment    Home Accessibility --   no stairs to enter, doesn't have to use stairs upstairs   -MA       User Key  (r) = Recorded By, (t) = Taken By, (c) = Cosigned By    Initials Name Provider Type    Emma Vasquez RN Registered Nurse    Bonny Cates, PT Physical Therapist          Physical Therapy  Education     Title: PT OT SLP Therapies (Done)     Topic: Physical Therapy (Done)     Point: Mobility training (Done)    Learning Progress Summary     Learner Status Readiness Method Response Comment Documented by    Patient Done Acceptance E BILL,NR  MA 11/08/18 1501          Point: Home exercise program (Done)    Learning Progress Summary     Learner Status Readiness Method Response Comment Documented by    Patient Done Acceptance E BILL,NR  MA 11/08/18 1501          Point: Body mechanics (Done)    Learning Progress Summary     Learner Status Readiness Method Response Comment Documented by    Patient Done Acceptance E BILL,NR  MA 11/08/18 1501          Point: Precautions (Done)    Learning Progress Summary     Learner Status Readiness Method Response Comment Documented by    Patient Done Acceptance E BILL,NR  MA 11/08/18 1501                      User Key     Initials Effective Dates Name Provider Type Discipline    MA 10/19/18 -  Bonny Vu, PT Physical Therapist PT                PT Recommendation and Plan  Anticipated Discharge Disposition (PT): home with assist, home with home health  Planned Therapy Interventions (PT Eval): balance training, bed mobility training, gait training, home exercise program, patient/family education, ROM (range of motion), strengthening, transfer training  Therapy Frequency (PT Clinical Impression): 2 times/day  Outcome Summary/Treatment Plan (PT)  Anticipated Equipment Needs at Discharge (PT):  (has Rwx)  Anticipated Discharge Disposition (PT): home with assist, home with home health  Outcome Summary: Pt POD0 L TKA. Previously independent. R TKA 2 years ago. Has necessary equipment at home. Upon eval, decreased L knee ROM/strength as expected. Will see pt to improve mobility and independence. Anticipate d/c home with [facility] PT when medically able. Will progress towards goals as tolerated.           Outcome Measures     Row Name 11/08/18 1500             How much help from another person do  you currently need...    Turning from your back to your side while in flat bed without using bedrails? 4  -MA      Moving from lying on back to sitting on the side of a flat bed without bedrails? 4  -MA      Moving to and from a bed to a chair (including a wheelchair)? 3  -MA      Standing up from a chair using your arms (e.g., wheelchair, bedside chair)? 3  -MA      Climbing 3-5 steps with a railing? 3  -MA      To walk in hospital room? 3  -MA      AM-PAC 6 Clicks Score 20  -MA         Functional Assessment    Outcome Measure Options AM-PAC 6 Clicks Basic Mobility (PT)  -MA        User Key  (r) = Recorded By, (t) = Taken By, (c) = Cosigned By    Initials Name Provider Type    Bonny Cates PT Physical Therapist           Time Calculation:         PT Charges     Row Name 11/08/18 1503             Time Calculation    Start Time 1425  -MA      Stop Time 1451  -MA      Time Calculation (min) 26 min  -MA      PT Received On 11/08/18  -MA      PT - Next Appointment 11/09/18  -MA      PT Goal Re-Cert Due Date 11/11/18  -MA         Time Calculation- PT    Total Timed Code Minutes- PT 15 minute(s)  -MA        User Key  (r) = Recorded By, (t) = Taken By, (c) = Cosigned By    Initials Name Provider Type    Bonny Cates PT Physical Therapist        Therapy Suggested Charges     Code   Minutes Charges    None           Therapy Charges for Today     Code Description Service Date Service Provider Modifiers Qty    00077779661 HC PT MOBILITY CURRENT 11/8/2018 Bonny Vu, PT GP, CJ 1    75845747806 HC PT MOBILITY PROJECTED 11/8/2018 Bonny Vu, PT GP, CI 1    62868542830 HC PT EVAL LOW COMPLEXITY 2 11/8/2018 Bonny Vu, PT GP 1    14010674182 HC PT THER PROC EA 15 MIN 11/8/2018 Bonny Vu, PT GP 1          PT G-Codes  PT Professional Judgement Used?: Yes  Outcome Measure Options: AM-PAC 6 Clicks Basic Mobility (PT)  AM-PAC 6 Clicks Score: 20  Functional Limitation: Mobility: Walking and moving around  Mobility:  Walking and Moving Around Current Status (): At least 20 percent but less than 40 percent impaired, limited or restricted  Mobility: Walking and Moving Around Goal Status (): At least 1 percent but less than 20 percent impaired, limited or restricted      Bonny Vu, PT  11/8/2018

## 2018-11-08 NOTE — ANESTHESIA PROCEDURE NOTES
Peripheral Block    Pre-sedation assessment completed: 11/8/2018 9:16 AM    Patient reassessed immediately prior to procedure    Patient location during procedure: holding area  Start time: 11/8/2018 9:16 AM  Stop time: 11/8/2018 9:24 AM  Reason for block: procedure for pain, at surgeon's request and post-op pain management  Performed by  Anesthesiologist: YOAN WOLFF  Preanesthetic Checklist  Completed: patient identified, site marked, surgical consent, pre-op evaluation, timeout performed, IV checked, risks and benefits discussed and monitors and equipment checked  Prep:  Pt Position: supine  Sterile barriers:cap, gloves, sterile barriers and mask  Prep: ChloraPrep  Patient monitoring: blood pressure monitoring, continuous pulse oximetry and EKG  Procedure  Sedation:yes  Performed under: local infiltration  Guidance:ultrasound guided  ULTRASOUND INTERPRETATION.  Using ultrasound guidance a 21 G gauge needle was placed in close proximity to the femoral nerve, at which point, under ultrasound guidance anesthetic was injected in the area of the nerve and spread of the anesthesia was seen on ultrasound in close proximity thereto.  There were no abnormalities seen on ultrasound; a digital image was taken; and the patient tolerated the procedure with no complications. Images:still images obtained    Laterality:left  Block Type:adductor canal block  Injection Technique:single-shot  Needle Type:echogenic  Needle Gauge:22 G  Resistance on Injection: none  Medications  Local Injected:ropivacaine 0.5% Local Amount Injected:30mL  Post Assessment  Injection Assessment: negative aspiration for heme, no paresthesia on injection and incremental injection  Patient Tolerance:comfortable throughout block  Complications:no

## 2018-11-09 VITALS
BODY MASS INDEX: 28.32 KG/M2 | SYSTOLIC BLOOD PRESSURE: 161 MMHG | TEMPERATURE: 99.1 F | DIASTOLIC BLOOD PRESSURE: 86 MMHG | HEIGHT: 71 IN | OXYGEN SATURATION: 98 % | WEIGHT: 202.31 LBS | HEART RATE: 98 BPM | RESPIRATION RATE: 18 BRPM

## 2018-11-09 PROBLEM — R26.89 DECREASED MOBILITY: Status: ACTIVE | Noted: 2018-11-09

## 2018-11-09 LAB
ANION GAP SERPL CALCULATED.3IONS-SCNC: 11.6 MMOL/L
BUN BLD-MCNC: 11 MG/DL (ref 8–23)
BUN/CREAT SERPL: 12.8 (ref 7–25)
CALCIUM SPEC-SCNC: 8.4 MG/DL (ref 8.6–10.5)
CHLORIDE SERPL-SCNC: 98 MMOL/L (ref 98–107)
CO2 SERPL-SCNC: 23.4 MMOL/L (ref 22–29)
CREAT BLD-MCNC: 0.86 MG/DL (ref 0.76–1.27)
GFR SERPL CREATININE-BSD FRML MDRD: 109 ML/MIN/1.73
GLUCOSE BLD-MCNC: 203 MG/DL (ref 65–99)
HCT VFR BLD AUTO: 34.3 % (ref 40.4–52.2)
HGB BLD-MCNC: 11.4 G/DL (ref 13.7–17.6)
POTASSIUM BLD-SCNC: 4 MMOL/L (ref 3.5–5.2)
SODIUM BLD-SCNC: 133 MMOL/L (ref 136–145)

## 2018-11-09 PROCEDURE — 85018 HEMOGLOBIN: CPT | Performed by: ORTHOPAEDIC SURGERY

## 2018-11-09 PROCEDURE — 80048 BASIC METABOLIC PNL TOTAL CA: CPT | Performed by: ORTHOPAEDIC SURGERY

## 2018-11-09 PROCEDURE — 85014 HEMATOCRIT: CPT | Performed by: ORTHOPAEDIC SURGERY

## 2018-11-09 PROCEDURE — 25010000002 HYDROMORPHONE PER 4 MG: Performed by: ORTHOPAEDIC SURGERY

## 2018-11-09 PROCEDURE — 97150 GROUP THERAPEUTIC PROCEDURES: CPT

## 2018-11-09 PROCEDURE — 97110 THERAPEUTIC EXERCISES: CPT

## 2018-11-09 PROCEDURE — 25010000002 KETOROLAC TROMETHAMINE PER 15 MG: Performed by: NURSE PRACTITIONER

## 2018-11-09 PROCEDURE — 25010000003 CEFAZOLIN IN DEXTROSE 2-4 GM/100ML-% SOLUTION: Performed by: ORTHOPAEDIC SURGERY

## 2018-11-09 RX ORDER — HYDROCODONE BITARTRATE AND ACETAMINOPHEN 7.5; 325 MG/1; MG/1
TABLET ORAL
Qty: 42 TABLET | Refills: 0
Start: 2018-11-09 | End: 2018-11-09

## 2018-11-09 RX ORDER — HYDROCODONE BITARTRATE AND ACETAMINOPHEN 7.5; 325 MG/1; MG/1
TABLET ORAL
Qty: 42 TABLET | Refills: 0 | Status: CANCELLED
Start: 2018-11-09

## 2018-11-09 RX ORDER — HYDROCODONE BITARTRATE AND ACETAMINOPHEN 7.5; 325 MG/1; MG/1
TABLET ORAL
Qty: 42 TABLET | Refills: 0 | Status: SHIPPED | OUTPATIENT
Start: 2018-11-09 | End: 2018-12-10 | Stop reason: SDUPTHER

## 2018-11-09 RX ORDER — KETOROLAC TROMETHAMINE 30 MG/ML
30 INJECTION, SOLUTION INTRAMUSCULAR; INTRAVENOUS ONCE
Status: COMPLETED | OUTPATIENT
Start: 2018-11-09 | End: 2018-11-09

## 2018-11-09 RX ORDER — ACETAMINOPHEN 325 MG/1
650 TABLET ORAL EVERY 6 HOURS PRN
Status: DISCONTINUED | OUTPATIENT
Start: 2018-11-09 | End: 2018-11-09 | Stop reason: HOSPADM

## 2018-11-09 RX ORDER — PSEUDOEPHEDRINE HCL 30 MG
100 TABLET ORAL 2 TIMES DAILY
Qty: 60 EACH | Refills: 0 | Status: SHIPPED | OUTPATIENT
Start: 2018-11-09 | End: 2018-12-10 | Stop reason: SDUPTHER

## 2018-11-09 RX ORDER — PSEUDOEPHEDRINE HCL 30 MG
100 TABLET ORAL 2 TIMES DAILY
Qty: 60 CAPSULE | Refills: 0 | Status: CANCELLED | OUTPATIENT
Start: 2018-11-09

## 2018-11-09 RX ADMIN — HYDROCODONE BITARTRATE AND ACETAMINOPHEN 1 TABLET: 7.5; 325 TABLET ORAL at 10:03

## 2018-11-09 RX ADMIN — MUPIROCIN: 20 OINTMENT TOPICAL at 09:03

## 2018-11-09 RX ADMIN — CEFAZOLIN SODIUM 2 G: 2 INJECTION, SOLUTION INTRAVENOUS at 01:01

## 2018-11-09 RX ADMIN — ASPIRIN 325 MG: 325 TABLET, DELAYED RELEASE ORAL at 07:54

## 2018-11-09 RX ADMIN — HYDROCODONE BITARTRATE AND ACETAMINOPHEN 2 TABLET: 7.5; 325 TABLET ORAL at 14:32

## 2018-11-09 RX ADMIN — KETOROLAC TROMETHAMINE 30 MG: 30 INJECTION, SOLUTION INTRAMUSCULAR at 07:54

## 2018-11-09 RX ADMIN — PANTOPRAZOLE SODIUM 40 MG: 40 TABLET, DELAYED RELEASE ORAL at 07:54

## 2018-11-09 RX ADMIN — HYDROCODONE BITARTRATE AND ACETAMINOPHEN 1 TABLET: 7.5; 325 TABLET ORAL at 01:40

## 2018-11-09 RX ADMIN — POLYETHYLENE GLYCOL 3350 17 G: 17 POWDER, FOR SOLUTION ORAL at 07:54

## 2018-11-09 RX ADMIN — DOCUSATE SODIUM 100 MG: 100 CAPSULE, LIQUID FILLED ORAL at 07:54

## 2018-11-09 RX ADMIN — HYDROMORPHONE HYDROCHLORIDE 0.5 MG: 1 INJECTION, SOLUTION INTRAMUSCULAR; INTRAVENOUS; SUBCUTANEOUS at 04:17

## 2018-11-09 RX ADMIN — ACETAMINOPHEN 650 MG: 325 TABLET, FILM COATED ORAL at 01:39

## 2018-11-09 RX ADMIN — METFORMIN HYDROCHLORIDE 500 MG: 500 TABLET, EXTENDED RELEASE ORAL at 07:53

## 2018-11-09 NOTE — PLAN OF CARE
Problem: Patient Care Overview  Goal: Plan of Care Review  Outcome: Ongoing (interventions implemented as appropriate)   11/09/18 1228   Coping/Psychosocial   Plan of Care Reviewed With patient   Plan of Care Review   Progress improving

## 2018-11-09 NOTE — PROGRESS NOTES
Continued Stay Note  [facility]     Patient Name: Bob Biggs Sr.  MRN: [id number]  Today's Date: 11/9/2018    Admit Date: 11/8/2018          Discharge Plan     Row Name 11/09/18 1158       Plan    Plan Home with family and [facility]    Patient/Family in Agreement with Plan yes    Plan Comments CCP met with pt at bedside, explained role of CCP and verified information gathered on pre-op ortho assessment. Pt confirms plan is still to go home with family and [facility]. Referral to [facility], spoke with Bill. CCP to follow for any additional needs. -RADHA Mckenzie              Discharge Codes    No documentation.           RADHA Mckenzie

## 2018-11-09 NOTE — THERAPY TREATMENT NOTE
Acute Care - Physical Therapy Treatment Note  [facility]     Patient Name: Bob Biggs Sr.  : 1955  MRN: [id number]  Today's Date: 2018        Referring Physician: Apoorva    Admit Date: 2018    Visit Dx:    ICD-10-CM ICD-9-CM   1. Decreased mobility R26.89 781.99   2. Primary osteoarthritis of right knee M17.11 715.16   3. Primary osteoarthritis of left knee M17.12 715.16     Patient Active Problem List   Diagnosis   • Osteoarthritis of right knee   • Status post total right knee replacement   • Diabetes (CMS/[facility])   • Arthritis   • Arthritis   • GERD (gastroesophageal reflux disease)   • Chronic pain of left knee   • Osteoarthritis of left knee       Therapy Treatment          Rehabilitation Treatment Summary     Row Name 18 1300 18 0830          Treatment Time/Intention    Discipline physical therapy assistant  - physical therapy assistant  -     Document Type therapy note (daily note)  - therapy note (daily note)  -     Subjective Information complains of;pain  - complains of;pain  -SM     Mode of Treatment physical therapy  - physical therapy  -     Patient Effort good  -SM good  -SM     Existing Precautions/Restrictions fall  -SM fall  -SM     Recorded by [SM] Peggy Smith, PTA 18 1451 [SM] Peggy Smith, PTA 18 1227     Row Name 18 1300 18 0830          Cognitive Assessment/Intervention- PT/OT    Orientation Status (Cognition) oriented x 4  -SM oriented x 4  -SM     Follows Commands (Cognition) WNL  -SM WNL  -SM     Personal Safety Interventions fall prevention program maintained;gait belt;nonskid shoes/slippers when out of bed  - fall prevention program maintained;gait belt;nonskid shoes/slippers when out of bed  -SM     Recorded by [SM] Peggy Smith, PTA 18 1451 [SM] Peggy Smith, PTA 18 1227     Row Name 18 1300 18 0830          Bed Mobility Assessment/Treatment    Comment (Bed  Mobility) up in chair  - up in chair  -     Recorded by [] Luis Peggy Ursula, PTA 11/09/18 1451 [SM] Nallely Smithah Ursula, PTA 11/09/18 1227     Row Name 11/09/18 1300 11/09/18 0830          Transfer Assessment/Treatment    Transfer Assessment/Treatment sit-stand transfer;stand-sit transfer  - sit-stand transfer;stand-sit transfer;toilet transfer  -     Recorded by [] Peggy Smith, PTA 11/09/18 1451 [SM] Nallely Smithah Ursula, PTA 11/09/18 1227     Row Name 11/09/18 1300 11/09/18 0830          Sit-Stand Transfer    Sit-Stand [place] (Transfers) stand by assist  - stand by assist  -     Assistive Device (Sit-Stand Transfers) walker, front-wheeled  - walker, front-wheeled  -     Recorded by [SM] Peggy Smith, PTA 11/09/18 1451 [SM] Nallely Smithah Ursula, PTA 11/09/18 1227     Row Name 11/09/18 1300 11/09/18 0830          Stand-Sit Transfer    Stand-Sit [place] (Transfers) stand by assist  - stand by assist  -     Assistive Device (Stand-Sit Transfers) walker, front-wheeled  - walker, front-wheeled  -     Recorded by [] Peggy Smith, PTA 11/09/18 1451 [SM] Nallely Smihtah Ursula, PTA 11/09/18 1227     Row Name 11/09/18 0830             Toilet Transfer    Type (Toilet Transfer) sit-stand;stand-sit  -      [place] Level (Toilet Transfer) conditional independence  -      Assistive Device (Toilet Transfer) commode;grab bars/safety frame;walker, front-wheeled  -      Recorded by [SM] Peggy Simth, PTA 11/09/18 1227      Row Name 11/09/18 1300 11/09/18 0830          Gait/Stairs Assessment/Training    [place] Level (Gait) stand by assist  - stand by assist  -     Assistive Device (Gait) walker, front-wheeled  -SM walker, front-wheeled  -     Distance in Feet (Gait) 200  -  -     Pattern (Gait) step-through  -SM step-through;step-to  -SM     Deviations/Abnormal Patterns (Gait) antalgic;kd decreased;stride length decreased  -SM  antalgic;kd decreased;stride length decreased  -SM     Left Sided Gait Deviations weight shift ability decreased  -SM weight shift ability decreased  -SM     Recorded by [SM] Peggy Smith, [facility] 11/09/18 1451 [SM] Peggy Smith, [facility] 11/09/18 1227     Row Name 11/09/18 0830             General ROM    GENERAL ROM COMMENTS L knee 14-72  -SM      Recorded by [SM] Peggy Smith, [facility] 11/09/18 1227      Row Name 11/09/18 1300 11/09/18 0830          Therapeutic Exercise    Comment (Therapeutic Exercise) L TKR protocol x 20 reps  -SM L TKR protocol x 15 reps  -SM     Recorded by [SM] Peggy Smith, [facility] 11/09/18 1451 [SM] Peggy Simth, [facility] 11/09/18 1227     Row Name 11/09/18 1300 11/09/18 0830          Positioning and Restraints    Pre-Treatment Position sitting in chair/recliner  -SM sitting in chair/recliner  -SM     Post Treatment Position chair  -SM chair  -SM     In Chair reclined;call light within reach;encouraged to call for assist;with family/caregiver  -SM reclined;call light within reach;encouraged to call for assist  -SM     Recorded by [SM] Peggy Smith, [facility] 11/09/18 1451 [SM] Peggy Smith, [facility] 11/09/18 1227     Row Name 11/09/18 1300 11/09/18 0830          Pain Assessment    Additional Documentation Pain Scale: Numbers Pre/Post-Treatment (Group)  -SM Pain Scale: Numbers Pre/Post-Treatment (Group)  -SM     Recorded by [SM] Peggy Smith, [facility] 11/09/18 1451 [SM] Peggy Smith, [facility] 11/09/18 1227     Row Name 11/09/18 1300 11/09/18 0830          Pain Scale: Numbers Pre/Post-Treatment    Pain Scale: Numbers, Pretreatment 4/10  -SM 3/10  -SM     Pain Scale: Numbers, Post-Treatment 4/10  -SM 5/10  -SM     Pain Location - Side Left  -SM Left  -SM     Pain Location knee  -SM knee  -SM     Pain Intervention(s) Repositioned;Ambulation/increased activity;Rest  -SM Repositioned;Ambulation/increased activity;Rest  -SM     Recorded by [SM] Peggy Smith, PTA  11/09/18 1451 [SM] Peggy Smith Ursula, PTA 11/09/18 1227     Row Name                Wound 11/08/18 1036 Left leg incision    Wound - Properties Group Date first assessed: 11/08/18 [MP] Time first assessed: 1036 [MP] Side: Left [MP] Location: leg [MP] Type: incision [MP] Recorded by:  [MP] Emma Calix RN 11/08/18 1036      User Key  (r) = Recorded By, (t) = Taken By, (c) = Cosigned By    Initials Name Effective Dates Discipline     Emma Calix RN 05/15/17 -  Nurse    SM Peggy Smith Ursula, PTA 03/07/18 -  PT          Wound 11/08/18 1036 Left leg incision (Active)   Dressing Appearance dry;intact;no drainage 11/9/2018 12:15 PM   Closure JAC 11/9/2018 12:15 PM   Base dressing in place, unable to visualize 11/9/2018 12:15 PM   Drainage Amount none 11/9/2018 12:15 PM             Physical Therapy Education     Title: PT OT SLP Therapies (Done)     Topic: Physical Therapy (Done)     Point: Mobility training (Done)    Learning Progress Summary     Learner Status Readiness Method Response Comment Documented by    Patient Done Acceptance ETORO D VU   11/09/18 1227     Done Acceptance E VUNR  MA 11/08/18 1501          Point: Home exercise program (Done)    Learning Progress Summary     Learner Status Readiness Method Response Comment Documented by    Patient Done Acceptance TORO QUEZADA D VU   11/09/18 1227     Done Acceptance E VUNR  MA 11/08/18 1501          Point: Body mechanics (Done)    Learning Progress Summary     Learner Status Readiness Method Response Comment Documented by    Patient Done Acceptance TORO QUEZADA D VU   11/09/18 1227     Done Acceptance E VU,NR  MA 11/08/18 1501          Point: Precautions (Done)    Learning Progress Summary     Learner Status Readiness Method Response Comment Documented by    Patient Done Acceptance TORO QUEZADA D VU   11/09/18 1227     Done Acceptance E VUNR  MA 11/08/18 1501                      User Key     Initials Effective Dates Name Provider Type Discipline     03/07/18  -  Peggy Smith PTA Physical Therapy Assistant PT    MA 10/19/18 -  Bonny Vu, PT Physical Therapist PT                    PT Recommendation and Plan     Plan of Care Reviewed With: patient  Progress: improving          Outcome Measures     Row Name 11/09/18 1200 11/08/18 1500          How much help from another person do you currently need...    Turning from your back to your side while in flat bed without using bedrails? 4  -SM 4  -MA     Moving from lying on back to sitting on the side of a flat bed without bedrails? 4  -SM 4  -MA     Moving to and from a bed to a chair (including a wheelchair)? 3  -SM 3  -MA     Standing up from a chair using your arms (e.g., wheelchair, bedside chair)? 4  -SM 3  -MA     Climbing 3-5 steps with a railing? 3  -SM 3  -MA     To walk in hospital room? 3  -SM 3  -MA     AM-PAC 6 Clicks Score 21  -SM 20  -MA        Functional Assessment    Outcome Measure Options AM-PAC 6 Clicks Basic Mobility (PT)  - AM-PAC 6 Clicks Basic Mobility (PT)  -MA       User Key  (r) = Recorded By, (t) = Taken By, (c) = Cosigned By    Initials Name Provider Type     Peggy Smith PTA Physical Therapy Assistant    Bonny Cates, PT Physical Therapist           Time Calculation:         PT Charges     Row Name 11/09/18 1456 11/09/18 1228          Time Calculation    Start Time 1300  - 0830  -SM     Stop Time 1340  -SM 0920  -SM     Time Calculation (min) 40 min  -SM 50 min  -     PT Received On 11/09/18  - 11/09/18  -     PT - Next Appointment 11/10/18  - 11/09/18  -       User Key  (r) = Recorded By, (t) = Taken By, (c) = Cosigned By    Initials Name Provider Type     Peggy Smith PTA Physical Therapy Assistant        Therapy Suggested Charges     Code   Minutes Charges    None           Therapy Charges for Today     Code Description Service Date Service Provider Modifiers Qty    49447826302  PT THER PROC EA 15 MIN 11/9/2018 Peggy Smith PTA GP 1     89150658648 HC PT THER PROC GROUP 11/9/2018 Peggy Smith, PTA GP 1    52713308801 HC PT THER PROC EA 15 MIN 11/9/2018 Peggy Smith, PTA GP 1    96089002044 HC PT THER PROC GROUP 11/9/2018 Peggy Smith, PTA GP 1          PT G-Codes  PT Professional Judgement Used?: Yes  Outcome Measure Options: AM-PAC 6 Clicks Basic Mobility (PT)  AM-PAC 6 Clicks Score: 21  Functional Limitation: Mobility: Walking and moving around  Mobility: Walking and Moving Around Current Status (): At least 20 percent but less than 40 percent impaired, limited or restricted  Mobility: Walking and Moving Around Goal Status (): At least 1 percent but less than 20 percent impaired, limited or restricted    Peggy Vergara LATRELL Smith  11/9/2018

## 2018-11-09 NOTE — PLAN OF CARE
Problem: Patient Care Overview  Goal: Plan of Care Review  Outcome: Ongoing (interventions implemented as appropriate)   11/09/18 1228 11/09/18 1630   OTHER   Outcome Summary --  pt POD 1 LTK. VSS. Dressing CDI. pain is controlled with PO pain medicaiton. slight temperature today. voiding per urinal. able to work with therapy up with walker and stand by assist x1. pt to D/C home with HH today.    Coping/Psychosocial   Plan of Care Reviewed With patient --    Plan of Care Review   Progress improving --      Goal: Individualization and Mutuality  Outcome: Ongoing (interventions implemented as appropriate)    Goal: Discharge Needs Assessment  Outcome: Ongoing (interventions implemented as appropriate)    Goal: Interprofessional Rounds/Family Conf  Outcome: Ongoing (interventions implemented as appropriate)      Problem: Fall Risk (Adult)  Goal: Identify Related Risk Factors and Signs and Symptoms  Outcome: Ongoing (interventions implemented as appropriate)    Goal: Absence of Fall  Outcome: Ongoing (interventions implemented as appropriate)      Problem: Knee Arthroplasty (Total, Partial) (Adult)  Goal: Signs and Symptoms of Listed Potential Problems Will be Absent, Minimized or Managed (Knee Arthroplasty)  Outcome: Ongoing (interventions implemented as appropriate)    Goal: Anesthesia/Sedation Recovery  Outcome: Ongoing (interventions implemented as appropriate)

## 2018-11-09 NOTE — DISCHARGE SUMMARY
Date of Admission:  11/8/2018    Date of Discharge:  11/9/2018    Discharge Diagnosis: s/p Left total knee arthroplasty    Admitting Physician: Nato Guerin    Consults: none    DETAILS OF HOSPITAL STAY:  Patient is a 63 y.o. male was admitted to the floor following a total knee arthroplasty.  Post-operatively the patient was transferred to the post-operative floor where the patient underwent physical therapy that included active as well as passive ROM exercises. Opioids were titrated to achieve appropriate pain management to allow for participation in mobilization exercises. Vital signs are now stable. The incision is benign without signs or symptoms of infection. Operative extremity neurovascular status remains intact. Appropriate education re: incision care, activity levels, medications, and follow up visits was completed and all questions were answered. The patient is now deemed stable for discharge to home.    Condition on Discharge:  Stable    Discharge Medications     Discharge Medications      Continue These Medications      Instructions Start Date   ONE TOUCH LANCETS misc   1 each, Does not apply, 2 Times Daily         ASK your doctor about these medications      Instructions Start Date   acetaminophen 650 MG 8 hr tablet  Commonly known as:  TYLENOL   650 mg, Oral, Every 8 Hours PRN      betamethasone dipropionate 0.05 % cream  Commonly known as:  DIPROLENE   Topical, 2 Times Daily      CENTRUM SILVER ADULT 50+ tablet   1 tablet, Oral, Daily, HOLD FOR SURGERY      cetirizine 10 MG tablet  Commonly known as:  zyrTEC   10 mg, Oral, Daily      Chlorhexidine Gluconate Cloth 2 % pads   1 application, Apply externally, 3 Times Daily      GLUCOSAMINE MSM COMPLEX tablet   1,500 mg, Oral, Nightly, HOLD FOR SURGERY      glucose blood test strip   Patient tests two times a day      guaiFENesin 600 MG 12 hr tablet  Commonly known as:  MUCINEX   1,200 mg, Oral, As Needed      metFORMIN  MG 24 hr tablet  Commonly  known as:  GLUCOPHAGE-XR   500 mg, Oral, Daily With Breakfast      mupirocin 2 % ointment  Commonly known as:  BACTROBAN   1 application, Topical, 3 Times Daily      pantoprazole 40 MG EC tablet  Commonly known as:  PROTONIX   40 mg, Oral, 2 Times Daily      valACYclovir 500 MG tablet  Commonly known as:  VALTREX   500 mg, Oral, Nightly             Discharge Diet: regular    Activity at Discharge: as tolerated    Discharge Instructions:   1)  Patient is to continue with physical therapy exercises daily and continue working with the physical therapist as ordered.  2)  Continue to follow precautions as instructed.   3)  Patient may weight bear as tolerated.   4)  Continue to ice regularly. Patient instructed on frequent calf pumping exercises.  Patient also instructed on incentive spirometer during hospitalization and encouraged to continue to use at home regularly.   5)  Patient is instructed to continue DVT prophylaxis.  6)  The dressing should be left in place. If waterproof dressing is intact the patient may shower immediately following discharge. If the dressing becomes disloged or saturated it should be changed and patient must wait until POD #5 to shower. If dressing is changed, apply dry sterile dressing after showering.  7)  Follow up appointment in 2 weeks - patient to call the office at [phone] to schedule.     Follow-up Appointments  2 weeks with Dr Apoorva Guerin MD  11/09/18  12:48 PM

## 2018-11-09 NOTE — THERAPY TREATMENT NOTE
Acute Care - Physical Therapy Treatment Note  [facility]     Patient Name: Bob Biggs Sr.  : 1955  MRN: [id number]  Today's Date: 2018        Referring Physician: Apoorva    Admit Date: 2018    Visit Dx:    ICD-10-CM ICD-9-CM   1. Decreased mobility R26.89 781.99   2. Primary osteoarthritis of right knee M17.11 715.16   3. Primary osteoarthritis of left knee M17.12 715.16     Patient Active Problem List   Diagnosis   • Osteoarthritis of right knee   • Status post total right knee replacement   • Diabetes (CMS/[facility])   • Arthritis   • Arthritis   • GERD (gastroesophageal reflux disease)   • Chronic pain of left knee   • Osteoarthritis of left knee       Therapy Treatment          Rehabilitation Treatment Summary     Row Name 18             Treatment Time/Intention    Discipline physical therapy assistant  -      Document Type therapy note (daily note)  -      Subjective Information complains of;pain  -SM      Mode of Treatment physical therapy  -SM      Patient Effort good  -SM      Existing Precautions/Restrictions fall  -SM      Recorded by [SM] Peggy Smith, PTA 18      Row Name 18             Cognitive Assessment/Intervention- PT/OT    Orientation Status (Cognition) oriented x 4  -SM      Follows Commands (Cognition) WNL  -SM      Personal Safety Interventions fall prevention program maintained;gait belt;nonskid shoes/slippers when out of bed  -SM      Recorded by [SM] Peggy Smith, PTA 18      Row Name 18             Bed Mobility Assessment/Treatment    Comment (Bed Mobility) up in chair  -SM      Recorded by [SM] Peggy Smith PTA 18      Row Name 18             Transfer Assessment/Treatment    Transfer Assessment/Treatment sit-stand transfer;stand-sit transfer;toilet transfer  -SM      Recorded by [SM] Peggy Smith PTA 18      Row Name 18              Sit-Stand Transfer    Sit-Stand [place] (Transfers) stand by assist  -      Assistive Device (Sit-Stand Transfers) walker, front-wheeled  -SM      Recorded by [SM] Peggy Smith, PTA 11/09/18 1227      Row Name 11/09/18 0830             Stand-Sit Transfer    Stand-Sit [place] (Transfers) stand by assist  -      Assistive Device (Stand-Sit Transfers) walker, front-wheeled  -SM      Recorded by [SM] Peggy Smith, PTA 11/09/18 1227      Row Name 11/09/18 0830             Toilet Transfer    Type (Toilet Transfer) sit-stand;stand-sit  -      [place] Level (Toilet Transfer) conditional independence  -      Assistive Device (Toilet Transfer) commode;grab bars/safety frame;walker, front-wheeled  -SM      Recorded by [SM] Peggy Smith, PTA 11/09/18 1227      Row Name 11/09/18 0830             Gait/Stairs Assessment/Training    [place] Level (Gait) stand by assist  -      Assistive Device (Gait) walker, front-wheeled  -      Distance in Feet (Gait) 200  -SM      Pattern (Gait) step-through;step-to  -SM      Deviations/Abnormal Patterns (Gait) antalgic;kd decreased;stride length decreased  -SM      Left Sided Gait Deviations weight shift ability decreased  -SM      Recorded by [SM] Peggy Smith, PTA 11/09/18 1227      Row Name 11/09/18 0830             General ROM    GENERAL ROM COMMENTS L knee 14-72  -      Recorded by [SM] Peggy Smith, PTA 11/09/18 1227      Row Name 11/09/18 0830             Therapeutic Exercise    Comment (Therapeutic Exercise) L TKR protocol x 15 reps  -SM      Recorded by [SM] Peggy Smith, PTA 11/09/18 1227      Row Name 11/09/18 0830             Positioning and Restraints    Pre-Treatment Position sitting in chair/recliner  -      Post Treatment Position chair  -SM      In Chair reclined;call light within reach;encouraged to call for assist  -SM      Recorded by [SM] Peggy Smith, PTA 11/09/18 1227      Row Name  11/09/18 0830             Pain Assessment    Additional Documentation Pain Scale: Numbers Pre/Post-Treatment (Group)  -SM      Recorded by [SM] Luis Peggy Ursula, PTA 11/09/18 1227      Row Name 11/09/18 0830             Pain Scale: Numbers Pre/Post-Treatment    Pain Scale: Numbers, Pretreatment 3/10  -SM      Pain Scale: Numbers, Post-Treatment 5/10  -SM      Pain Location - Side Left  -SM      Pain Location knee  -SM      Pain Intervention(s) Repositioned;Ambulation/increased activity;Rest  -SM      Recorded by [SM] Peggy Smith, PTA 11/09/18 1227      Row Name                Wound 11/08/18 1036 Left leg incision    Wound - Properties Group Date first assessed: 11/08/18 [MP] Time first assessed: 1036 [MP] Side: Left [MP] Location: leg [MP] Type: incision [MP] Recorded by:  [MP] Emma Calix RN 11/08/18 1036      User Key  (r) = Recorded By, (t) = Taken By, (c) = Cosigned By    Initials Name Effective Dates Discipline    MP Emma Calix RN 05/15/17 -  Nurse     Nallely Smithah Ursula, PTA 03/07/18 -  PT          Wound 11/08/18 1036 Left leg incision (Active)   Dressing Appearance dry;intact;no drainage 11/9/2018  7:54 AM   Closure JAC 11/9/2018  7:05 AM   Base dressing in place, unable to visualize 11/9/2018  7:05 AM   Drainage Amount none 11/8/2018  8:14 PM             Physical Therapy Education     Title: PT OT SLP Therapies (Done)     Topic: Physical Therapy (Done)     Point: Mobility training (Done)    Learning Progress Summary     Learner Status Readiness Method Response Comment Documented by    Patient Done Acceptance TORO QUEZADA D VU   11/09/18 1227     Done Acceptance WERNER OSBORNE MA 11/08/18 1501          Point: Home exercise program (Done)    Learning Progress Summary     Learner Status Readiness Method Response Comment Documented by    Patient Done Acceptance TORO QUEZADA D VU   11/09/18 1227     Done Acceptance WERNER OSBORNE MA 11/08/18 1501          Point: Body mechanics (Done)    Learning Progress  Summary     Learner Status Readiness Method Response Comment Documented by    Patient Done Acceptance TORO QUEZADA D VU   11/09/18 1227     Done Acceptance E VU,NR  MA 11/08/18 1501          Point: Precautions (Done)    Learning Progress Summary     Learner Status Readiness Method Response Comment Documented by    Patient Done Acceptance TORO QUEZADA D VU   11/09/18 1227     Done Acceptance PILAR KHANNR  MA 11/08/18 1501                      User Key     Initials Effective Dates Name Provider Type Discipline     03/07/18 -  Peggy Smith PTA Physical Therapy Assistant PT    MA 10/19/18 -  Bonny Vu, ERIC Physical Therapist PT                    PT Recommendation and Plan     Plan of Care Reviewed With: patient  Progress: improving          Outcome Measures     Row Name 11/09/18 1200 11/08/18 1500          How much help from another person do you currently need...    Turning from your back to your side while in flat bed without using bedrails? 4  -SM 4  -MA     Moving from lying on back to sitting on the side of a flat bed without bedrails? 4  -SM 4  -MA     Moving to and from a bed to a chair (including a wheelchair)? 3  -SM 3  -MA     Standing up from a chair using your arms (e.g., wheelchair, bedside chair)? 4  -SM 3  -MA     Climbing 3-5 steps with a railing? 3  -SM 3  -MA     To walk in hospital room? 3  -SM 3  -MA     AM-PAC 6 Clicks Score 21  - 20  -MA        Functional Assessment    Outcome Measure Options AM-PAC 6 Clicks Basic Mobility (PT)  - AM-PAC 6 Clicks Basic Mobility (PT)  -MA       User Key  (r) = Recorded By, (t) = Taken By, (c) = Cosigned By    Initials Name Provider Type     Peggy Smith PTA Physical Therapy Assistant    Bonny Cates, PT Physical Therapist           Time Calculation:         PT Charges     Row Name 11/09/18 1228             Time Calculation    Start Time 0830  -      Stop Time 0920  -      Time Calculation (min) 50 min  -      PT Received On 11/09/18  -      PT -  Next Appointment 11/09/18  -        User Key  (r) = Recorded By, (t) = Taken By, (c) = Cosigned By    Initials Name Provider Type    Peggy Faria PTA Physical Therapy Assistant        Therapy Suggested Charges     Code   Minutes Charges    None           Therapy Charges for Today     Code Description Service Date Service Provider Modifiers Qty    06529700174 HC PT THER PROC EA 15 MIN 11/9/2018 Peggy Smith, LATRELL GP 1    75742701282 HC PT THER PROC GROUP 11/9/2018 Peggy Smith PTA GP 1          PT G-Codes  PT Professional Judgement Used?: Yes  Outcome Measure Options: AM-PAC 6 Clicks Basic Mobility (PT)  AM-PAC 6 Clicks Score: 21  Functional Limitation: Mobility: Walking and moving around  Mobility: Walking and Moving Around Current Status (): At least 20 percent but less than 40 percent impaired, limited or restricted  Mobility: Walking and Moving Around Goal Status (): At least 1 percent but less than 20 percent impaired, limited or restricted    Peggy Smith PTA  11/9/2018

## 2018-11-09 NOTE — PLAN OF CARE
Problem: Patient Care Overview  Goal: Plan of Care Review  Outcome: Ongoing (interventions implemented as appropriate)   11/09/18 0437   OTHER   Outcome Summary Pain meds given several times during shift. Temp max 101.1 Tylenol given. Voilding per urinal   Coping/Psychosocial   Plan of Care Reviewed With patient   Plan of Care Review   Progress improving      11/09/18 0437   OTHER   Outcome Summary Pain meds given several times during shift. Temp max 101.1 Tylenol given. Voilding per urinal   Coping/Psychosocial   Plan of Care Reviewed With patient   Plan of Care Review   Progress improving      11/09/18 0437   OTHER   Outcome Summary Pain meds given several times during shift. Temp max 101.1 Tylenol given. Dsg C/D/I Voilding per urinal. Wife at bs. Will continue to monitor.   Coping/Psychosocial   Plan of Care Reviewed With patient   Plan of Care Review   Progress improving      11/09/18 0437   OTHER   Outcome Summary Pain meds given several times during shift. Temp max 101.1 Tylenol given. Voilding per urinal   Coping/Psychosocial   Plan of Care Reviewed With patient   Plan of Care Review   Progress improving     Goal: Individualization and Mutuality  Outcome: Outcome(s) achieved Date Met: 11/09/18    Goal: Discharge Needs Assessment  Outcome: Ongoing (interventions implemented as appropriate)    Goal: Interprofessional Rounds/Family Conf  Outcome: Ongoing (interventions implemented as appropriate)      Problem: Fall Risk (Adult)  Goal: Identify Related Risk Factors and Signs and Symptoms  Outcome: Ongoing (interventions implemented as appropriate)    Goal: Absence of Fall  Outcome: Ongoing (interventions implemented as appropriate)      Problem: Knee Arthroplasty (Total, Partial) (Adult)  Goal: Signs and Symptoms of Listed Potential Problems Will be Absent, Minimized or Managed (Knee Arthroplasty)  Outcome: Ongoing (interventions implemented as appropriate)    Goal: Anesthesia/Sedation Recovery  Outcome: Ongoing  (interventions implemented as appropriate)

## 2018-11-14 ENCOUNTER — TELEPHONE (OUTPATIENT)
Dept: ORTHOPEDIC SURGERY | Facility: CLINIC | Age: 63
End: 2018-11-14

## 2018-11-14 RX ORDER — OXYCODONE AND ACETAMINOPHEN 7.5; 325 MG/1; MG/1
1 TABLET ORAL EVERY 4 HOURS PRN
Qty: 60 TABLET | Refills: 0 | Status: SHIPPED | OUTPATIENT
Start: 2018-11-14 | End: 2018-12-11 | Stop reason: SDUPTHER

## 2018-11-14 NOTE — TELEPHONE ENCOUNTER
"Please review and advise - \"HYDROCODONE 7.5 IS NOT WORKING FOR HIS PAIN. HE IS AT AN 8. WOULD LIKE TO TRY SOMETHING ELSE\"  "

## 2018-11-26 ENCOUNTER — OFFICE VISIT (OUTPATIENT)
Dept: ORTHOPEDIC SURGERY | Facility: CLINIC | Age: 63
End: 2018-11-26

## 2018-11-26 VITALS — HEIGHT: 71 IN | TEMPERATURE: 99.2 F | BODY MASS INDEX: 27.86 KG/M2 | WEIGHT: 199 LBS

## 2018-11-26 DIAGNOSIS — Z96.652 STATUS POST TOTAL LEFT KNEE REPLACEMENT: Primary | ICD-10-CM

## 2018-11-26 PROCEDURE — 99024 POSTOP FOLLOW-UP VISIT: CPT | Performed by: ORTHOPAEDIC SURGERY

## 2018-11-26 PROCEDURE — 73560 X-RAY EXAM OF KNEE 1 OR 2: CPT | Performed by: ORTHOPAEDIC SURGERY

## 2018-11-26 RX ORDER — OXYCODONE AND ACETAMINOPHEN 7.5; 325 MG/1; MG/1
1 TABLET ORAL EVERY 4 HOURS PRN
Qty: 60 TABLET | Refills: 0 | Status: SHIPPED | OUTPATIENT
Start: 2018-11-26 | End: 2018-12-26 | Stop reason: SDUPTHER

## 2018-11-26 NOTE — PROGRESS NOTES
Bob Biggs .     : 1955     MRN: [id number]     DATE: 2018    DIAGNOSIS:  2 week follow up left TKA    SUBJECTIVE:  Patient returns today for 2 week follow up of left total knee replacement. Patient reports doing well with no unusual complaints.     OBJECTIVE:     Exam: The incision is healing appropriately.  No sign of infection.  Range of motion is a little behind.  The calf is soft and nontender with a negative Homans sign.    DIAGNOSTIC STUDIES     Xrays: 2 views of the left knee (AP and lateral) were ordered and reviewed for evaluation of recent knee replacement. They demonstrate a well positioned, well aligned knee replacement without complicating factors noted. In comparison with previous films there has been interval implant placement.    ASSESSMENT: 2 week status post left knee replacement.    PLAN:   1) Order given for PT   2) Discontinue RADHA hose   3) Continue ice PRN   4) Continue DVT prophylaxis.   5) Follow up in 4 weeks with repeat 3 view x-rays   6)  I did agree to refill his percocet.  Risks were discussed.    Nato Guerin MD  2018

## 2018-11-27 ENCOUNTER — TELEPHONE (OUTPATIENT)
Dept: ORTHOPEDIC SURGERY | Facility: CLINIC | Age: 63
End: 2018-11-27

## 2018-11-27 ENCOUNTER — HOSPITAL ENCOUNTER (OUTPATIENT)
Dept: PHYSICAL THERAPY | Facility: HOSPITAL | Age: 63
Setting detail: THERAPIES SERIES
Discharge: HOME | End: 2018-11-27
Attending: ORTHOPAEDIC SURGERY

## 2018-11-27 DIAGNOSIS — Z78.9 IMPAIRED MOBILITY AND ACTIVITIES OF DAILY LIVING: ICD-10-CM

## 2018-11-27 DIAGNOSIS — Z47.89 ORTHOPEDIC AFTERCARE: ICD-10-CM

## 2018-11-27 DIAGNOSIS — G89.29 CHRONIC PAIN OF LEFT KNEE: Primary | ICD-10-CM

## 2018-11-27 DIAGNOSIS — M25.562 CHRONIC PAIN OF LEFT KNEE: Primary | ICD-10-CM

## 2018-11-27 DIAGNOSIS — Z96.652 STATUS POST LEFT KNEE REPLACEMENT: ICD-10-CM

## 2018-11-27 DIAGNOSIS — Z74.09 IMPAIRED MOBILITY AND ACTIVITIES OF DAILY LIVING: ICD-10-CM

## 2018-11-27 PROCEDURE — 97010 HOT OR COLD PACKS THERAPY: CPT | Performed by: PHYSICAL THERAPIST

## 2018-11-27 PROCEDURE — G0283 ELEC STIM OTHER THAN WOUND: HCPCS | Performed by: PHYSICAL THERAPIST

## 2018-11-27 PROCEDURE — 97162 PT EVAL MOD COMPLEX 30 MIN: CPT | Performed by: PHYSICAL THERAPIST

## 2018-11-27 PROCEDURE — 97110 THERAPEUTIC EXERCISES: CPT | Performed by: PHYSICAL THERAPIST

## 2018-11-27 NOTE — THERAPY EVALUATION
"    Outpatient Physical Therapy Ortho Initial Evaluation  [facility]     Patient Name: Bob Biggs Sr.  : 1955  MRN: [id number]  Today's Date: 2018      Visit Date: 2018    Patient Active Problem List   Diagnosis   • Osteoarthritis of right knee   • Status post total right knee replacement   • Diabetes (CMS/HCC)   • Arthritis   • Arthritis   • GERD (gastroesophageal reflux disease)   • Chronic pain of left knee   • Osteoarthritis of left knee   • Decreased mobility        Past Medical History:   Diagnosis Date   • Arthritis    • Diabetes mellitus (CMS/HCC)    • Finger fracture, left 2018   • GERD (gastroesophageal reflux disease)    • Painful swelling of joint    • Tear of meniscus of knee         Past Surgical History:   Procedure Laterality Date   • COLONOSCOPY     • HYDROCELECTOMY      late    • KNEE ARTHROSCOPY Left ,    • KNEE SURGERY  2016    Right TKA       Visit Dx:     ICD-10-CM ICD-9-CM   1. Chronic pain of left knee M25.562 719.46    G89.29 338.29   2. Orthopedic aftercare Z47.89 V54.9   3. Impaired mobility and activities of daily living Z74.09 799.89   4. Status post left knee replacement Z96.652 V43.65       Patient History     Row Name 18 1000             History    Chief Complaint  Balance Problems;Difficulty Walking;Difficulty with daily activities;Fatigue/poor endurance;Joint stiffness;Joint swelling;Muscle tenderness;Muscle weakness;Pain;Swelling;Tightness  -CK      Type of Pain  Knee pain  -CK      Date Current Problem(s) Began  18  -CK      Brief Description of Current Complaint  TKR on 18. Two weeks of home health therapy. \"Not doing well\". In a lot of pain and unable to bend it.   -CK      Patient/Caregiver Goals  Relieve pain;Improve mobility;Know what to do to help the symptoms;Decrease swelling;Heal wound;Improve strength  -CK      Current Tobacco Use  none  -CK      Patient's Rating of General Health  Good  -CK      "    Pain     Pain Location  Knee  -CK      Pain at Present  6  -CK      Pain at Best  6  -CK      Pain at Worst  9  -CK      Pain Frequency  Constant/continuous  -CK      Pain Description  Throbbing;Tightness;Aching  -CK      Is your sleep disturbed?  Yes  -CK      Difficulties with ADL's?  yes, all  -CK      Difficulties with recreational activities?  yes, all  -CK         Fall Risk Assessment    Any falls in the past year:  No  -CK         Services    Prior Rehab/Home Health Experiences  Yes  -CK      When was the prior experience with Rehab/Home Health  last week  -CK      Are you currently receiving Home Health services  No  -CK         Daily Activities    Primary Language  English  -CK      How does patient learn best?  Demonstration;Pictures/Video;Listening  -CK      Teaching needs identified  Home Exercise Program;Management of Condition  -CK      Patient is concerned about/has problems with  Bed Mobility;Climbing Stairs;Difficulty with self care (i.e. bathing, dressing, toileting:;Flexibility;Performing home management (household chores, shopping, care of dependents);Sitting;Standing;Transfers (getting out of a chair, bed);Walking  -CK      Does patient have problems with the following?  None  -CK      Barriers to learning  None  -CK      Pt Participated in POC and Goals  Yes  -CK         Safety    Are you being hurt, hit, or frightened by anyone at home or in your life?  No  -CK      Are you being neglected by a caregiver  No  -CK        User Key  (r) = Recorded By, (t) = Taken By, (c) = Cosigned By    Initials Name Provider Type    CK Rahul Valdovinos, PT Physical Therapist          PT Ortho     Row Name 11/27/18 1300       Posture/Observations    Observations  Edema;Erythema;Incision healing  -CK    Posture/Observations Comments  Healing blisters on lateral side of L knee from bandage/tape  -CK       Left Lower Ext    Lt Hip Flexion AROM  95 in supine  -CK    Lt Hip Flexion PROM  full  -CK    Lt Knee  Extension/Flexion AROM  (25)-60  -CK    Lt Knee Extension/Flexion PROM  (10)-65  -CK       MMT (Manual Muscle Testing)    Lt Lower Ext  Lt Hip Flexion;Lt Knee Extension;Lt Knee Flexion  -CK       MMT Left Lower Ext    Lt Hip Flexion MMT, Gross Movement  (3-/5) fair minus  -CK    Lt Knee Extension MMT, Gross Movement  (2-/5) poor minus  -CK    Lt Knee Flexion MMT, Gross Movement  (2-/5) poor minus  -CK       Flexibility    Flexibility Tested?  Lower Extremity  -CK       Lower Extremity Flexibility    Hamstrings  Left:;Moderately limited  -CK    Quadriceps  Left:;Moderately limited  -CK    Gastrocnemius  Left:;Moderately limited  -CK       Balance Skills Training    SLS  unable on L  -CK    Rhomberg  unable without AD  -CK    Sharpened Rhomberg  unable without assist  -CK       Transfers    Sit-Stand Elliott (Transfers)  conditional independence  -CK    Stand-Sit Elliott (Transfers)  conditional independence  -CK    Transfers, Sit-Stand-Sit, Assist Device  rolling walker  -CK       Gait/Stairs Assessment/Training    Gait/Stairs Assessment/Training  gait/ambulation independence  -CK    Elliott Level (Gait)  conditional independence  -CK    Assistive Device (Gait)  walker, front-wheeled  -CK    Pattern (Gait)  step-to  -CK    Deviations/Abnormal Patterns (Gait)  antalgic;gait speed decreased;stride length decreased  -CK    Left Sided Gait Deviations  heel strike decreased;weight shift ability decreased  -CK    Handrail Location (Stairs)  right side (ascending)  -CK    Number of Steps (Stairs)  4  -CK    Ascending Technique (Stairs)  step-to-step  -CK    Descending Technique (Stairs)  step-to-step  -CK      User Key  (r) = Recorded By, (t) = Taken By, (c) = Cosigned By    Initials Name Provider Type    CK Rahul Valdovinos, PT Physical Therapist                      Therapy Education  Education Details: patient to bring home tens unit tomorrow to be educated on placement around knee to wear with  exercises.  Given: HEP, Symptoms/condition management, Edema management, Mobility training  Program: New  How Provided: Verbal, Demonstration  Provided to: Patient  Level of Understanding: Teach back education performed, Verbalized, Demonstrated     PT OP Goals     Row Name 11/27/18 1300          PT Short Term Goals    STG 1  Patient will be independent and compliant with initial home exercise program  -CK     STG 2  Patient will be independent with patellar and scar tissue mobilization techniques.  -CK     STG 3  Patient will be independent with education for symptom management, joint protection and strategies to minimize stress on affected tissues  -CK     STG 4  Patient will demonstrate L knee active flexion to >/= 95 degrees  -CK     STG 5  Patient will be able to bring L leg onto/off of bed and into/out of car without use of B hands.   -CK        Long Term Goals    LTG 1  Patient will be independent and compliant with advanced home exercise program to facilitate self-management of symptoms  -CK     LTG 2  Patient will ambulate with least restrictive assistive device with near normal gait on level ground short community distances  -CK     LTG 3  Patient will demonstrate improved L knee flexion to at least 110 degrees and full knee extension for improved AROM and ease with ADLs.  -CK     LTG 4  Patient will negotiate stairs reciprocally with rail support as needed.  -CK     LTG 5  Patient will demonstrate 4+/5 L knee strength (flexion/extension) for improved function and ease with return to deficit free ambulation and stair negotiation.  -CK       User Key  (r) = Recorded By, (t) = Taken By, (c) = Cosigned By    Initials Name Provider Type    CK Rahul Valdovinos, PT Physical Therapist          PT Assessment/Plan     Row Name 11/27/18 1406          PT Assessment    Functional Limitations  Decreased safety during functional activities;Impaired gait;Limitation in home management;Limitations in community  "activities;Performance in work activities;Performance in sport activities;Performance in self-care ADL;Limitations in functional capacity and performance;Performance in leisure activities  -CK     Impairments  Balance;Gait;Impaired flexibility;Muscle strength;Pain;Range of motion;Motor function;Endurance;Edema  -CK     Assessment Comments  Mr. Biggs is a 63 year old male who presents to clinic s/p L TKR performed on 11/8/18. He reports of PMH of R TKR (2016) with no adverse effects. No personal factors that effect plan of care reported at time of evaluation. He presents today with RWx for ambulation and friend to drive him. He reports \"worsening\" ability to bend his knee the last week. Observation of L knee revealed healing blisters most likely from taping/bandaging along lateral aspect of knee. Knee remains swollen and warm to touch. Atrophy of L quad noted. Poor ability to activate quad musculature with quad set. Antalgic gait pattern noted on L with lack of heel strike and proper knee flexion/extension mechanics. AROM (25)-60 with significant pain. MMT 2-/5 due to lack of ability to complete range even with gravity eliminated. Unable to lift LLE onto/off of table without use of BUE due to lack of hip flexor strength. Mr. Biggs self scored a 21/80 on the Knee Outcome Survey (where 80/80= no deficits). He is appropriate for skilled therapy services at this time to address the aforementioned deficits in order to allow for improved function and independence with all mobility tasks.   -CK     Please refer to paper survey for additional self-reported information  Yes  -CK     Rehab Potential  Good  -CK     Patient/caregiver participated in establishment of treatment plan and goals  Yes  -CK     Patient would benefit from skilled therapy intervention  Yes  -CK        PT Plan    PT Frequency  2x/week;3x/week  -CK     Predicted Duration of Therapy Intervention (Therapy Eval)  12 weeks  -CK     Planned CPT's?  PT EVAL " MOD COMPLELITY: 70127;PT THER PROC EA 15 MIN: 73080;PT MANUAL THERAPY EA 15 MIN: 84757;PT NEUROMUSC RE-EDUCATION EA 15 MIN: 92482;PT ELECTRICAL STIM UNATTEND: ;PT ELECTRICAL STIM ATTD EA 15 MIN: 01891;PT HOT/COLD PACK WC NONMCARE: 81594  -CK     Physical Therapy Interventions (Optional Details)  balance training;bed mobility training;dry needling;gait training;gross motor skills;neuromuscular re-education;stretching;strengthening;stair training;modalities;manual therapy techniques;ROM (Range of Motion);patient/family education;home exercise program  -CK     PT Plan Comments  Nustep warm up. Use NMES to activate quad tissue during exercises for TKR. Add Calf stretch/HS stretch, seated HS curls or isometrics. Patient to bring home tens unit for instruction of use at home to engage quad tissue.   -CK       User Key  (r) = Recorded By, (t) = Taken By, (c) = Cosigned By    Initials Name Provider Type    CK Rahul Valdovinos, PT Physical Therapist          Modalities     Row Name 11/27/18 1300             Ice    Ice Applied  Yes  -CK      Location  L knee: wrapped around, over bolster  -CK      Rx Minutes  15 mins  -CK      Ice S/P Rx  Yes  -CK         ELECTRICAL STIMULATION    Attended/Unattended  Unattended  -CK      Stimulation Type  IFC  -CK      Max mAmp  17.8  -CK      Location/Electrode Placement/Other  jayy cross L knee and over quad  -CK       PT E-Stim Unattended (Manual) Minutes  15  -CK        User Key  (r) = Recorded By, (t) = Taken By, (c) = Cosigned By    Initials Name Provider Type    CK Rahul Valdovinos, PT Physical Therapist        Exercises     Row Name 11/27/18 1400             Total Minutes    04613 - PT Therapeutic Exercise Minutes  20  -CK         Exercise 1    Exercise Name 1  Quad set  -CK      Reps 1  10  -CK      Time 1  5 sec  -CK         Exercise 2    Exercise Name 2  CKC quad set, ball on wall  -CK      Reps 2  10  -CK      Time 2  5 sec  -CK         Exercise 3    Exercise Name 3  Heel  slide, sheet around foot  -CK      Reps 3  10  -CK      Additional Comments  supine  -CK         Exercise 4    Exercise Name 4  AA SAQ  -CK      Reps 4  15  -CK         Exercise 5    Exercise Name 5  Sitting EOB, letting knee dangle  -CK         Exercise 6    Exercise Name 6  Nustep, no resistance  -CK      Time 6  6 min  -CK      Additional Comments  use arms to pull and bend knee  -CK        User Key  (r) = Recorded By, (t) = Taken By, (c) = Cosigned By    Initials Name Provider Type    CK Rahul Valdovinos, PT Physical Therapist                        Outcome Measure Options: Knee Outcome Score- ADL  Knee Outcome Score  Knee Outcome Score Comments: 21/80 = 26%      Time Calculation:     Therapy Suggested Charges     Code   Minutes Charges    34147 (CPT®) Hc Pt Neuromusc Re Education Ea 15 Min      71496 (CPT®) Hc Pt Ther Proc Ea 15 Min 20 1    29387 (CPT®) Hc Gait Training Ea 15 Min      46046 (CPT®) Hc Pt Therapeutic Act Ea 15 Min      53468 (CPT®) Hc Pt Manual Therapy Ea 15 Min      65481 (CPT®) Hc Pt Ther Massage- Per 15 Min      27636 (CPT®) Hc Pt Iontophoresis Ea 15 Min      44325 (CPT®) Hc Pt Elec Stim Ea-Per 15 Min      66707 (CPT®) Hc Pt Ultrasound Ea 15 Min      36912 (CPT®) Hc Pt Self Care/Mgmt/Train Ea 15 Min      82488 (CPT®) Hc Pt Prosthetic (S) Train Initial Encounter, Each 15 Min      91596 (CPT®) Hc Orthotic(S) Mgmt/Train Initial Encounter, Each 15min      76754 (CPT®) Hc Pt Aquatic Therapy Ea 15 Min      64701 (CPT®) Hc Pt Orthotic(S)/Prosthetic(S) Encounter, Each 15 Min       (CPT®) Hc Pt Electrical Stim Unattended 15 1    Total  35 2          Start Time: 0915  Stop Time: 1015  Time Calculation (min): 60 min  Total Timed Code Minutes- PT: 20 minute(s)     Therapy Charges for Today     Code Description Service Date Service Provider Modifiers Qty    81213279624 HC PT THER PROC EA 15 MIN 11/27/2018 Rahul Valdovinos, PT GP 1    63030433312 HC PT ELECTRICAL STIM UNATTENDED 11/27/2018 Rahul Valdovinos,  PT  1    80073610950 HC PT EVAL MOD COMPLEXITY 1 11/27/2018 Rahul Valdovinos, PT GP 1    99745875326 HC PT HOT/COLD PACK WC NONMCARE 11/27/2018 Rahul Valdovinos, PT GP 1          PT G-Codes  Outcome Measure Options: Knee Outcome Score- ADL         Rahul Valdovinos, PT  11/27/2018

## 2018-11-28 ENCOUNTER — HOSPITAL ENCOUNTER (OUTPATIENT)
Dept: PHYSICAL THERAPY | Facility: HOSPITAL | Age: 63
Setting detail: THERAPIES SERIES
Discharge: HOME | End: 2018-11-28

## 2018-11-28 DIAGNOSIS — Z47.89 ORTHOPEDIC AFTERCARE: ICD-10-CM

## 2018-11-28 DIAGNOSIS — Z78.9 IMPAIRED MOBILITY AND ACTIVITIES OF DAILY LIVING: ICD-10-CM

## 2018-11-28 DIAGNOSIS — M25.562 CHRONIC PAIN OF LEFT KNEE: Primary | ICD-10-CM

## 2018-11-28 DIAGNOSIS — Z74.09 IMPAIRED MOBILITY AND ACTIVITIES OF DAILY LIVING: ICD-10-CM

## 2018-11-28 DIAGNOSIS — Z96.652 STATUS POST LEFT KNEE REPLACEMENT: ICD-10-CM

## 2018-11-28 DIAGNOSIS — G89.29 CHRONIC PAIN OF LEFT KNEE: Primary | ICD-10-CM

## 2018-11-28 PROCEDURE — G0283 ELEC STIM OTHER THAN WOUND: HCPCS

## 2018-11-28 PROCEDURE — 97140 MANUAL THERAPY 1/> REGIONS: CPT

## 2018-11-28 PROCEDURE — 97110 THERAPEUTIC EXERCISES: CPT

## 2018-11-28 NOTE — THERAPY TREATMENT NOTE
Outpatient Physical Therapy Ortho Treatment Note  [facility]     Patient Name: Bob Biggs Sr.  : 1955  MRN: [id number]  Today's Date: 2018      Visit Date: 2018    Visit Dx:    ICD-10-CM ICD-9-CM   1. Chronic pain of left knee M25.562 719.46    G89.29 338.29   2. Orthopedic aftercare Z47.89 V54.9   3. Impaired mobility and activities of daily living Z74.09 799.89   4. Status post left knee replacement Z96.652 V43.65       Patient Active Problem List   Diagnosis   • Osteoarthritis of right knee   • Status post total right knee replacement   • Diabetes (CMS/HCC)   • Arthritis   • Arthritis   • GERD (gastroesophageal reflux disease)   • Chronic pain of left knee   • Osteoarthritis of left knee   • Decreased mobility        Past Medical History:   Diagnosis Date   • Arthritis    • Diabetes mellitus (CMS/HCC)    • Finger fracture, left 2018   • GERD (gastroesophageal reflux disease)    • Painful swelling of joint    • Tear of meniscus of knee         Past Surgical History:   Procedure Laterality Date   • COLONOSCOPY     • HYDROCELECTOMY      late    • KNEE ARTHROSCOPY Left ,    • KNEE SURGERY  2016    Right TKA       PT Ortho     Row Name 18 1300       Posture/Observations    Observations  Edema;Erythema;Incision healing  -CK    Posture/Observations Comments  Healing blisters on lateral side of L knee from bandage/tape  -CK       Left Lower Ext    Lt Hip Flexion AROM  95 in supine  -CK    Lt Hip Flexion PROM  full  -CK    Lt Knee Extension/Flexion AROM  (25)-60  -CK    Lt Knee Extension/Flexion PROM  (10)-65  -CK       MMT (Manual Muscle Testing)    Lt Lower Ext  Lt Hip Flexion;Lt Knee Extension;Lt Knee Flexion  -CK       MMT Left Lower Ext    Lt Hip Flexion MMT, Gross Movement  (3-/5) fair minus  -CK    Lt Knee Extension MMT, Gross Movement  (2-/5) poor minus  -CK    Lt Knee Flexion MMT, Gross Movement  (2-/5) poor minus  -CK       Flexibility     Flexibility Tested?  Lower Extremity  -CK       Lower Extremity Flexibility    Hamstrings  Left:;Moderately limited  -CK    Quadriceps  Left:;Moderately limited  -CK    Gastrocnemius  Left:;Moderately limited  -CK       Balance Skills Training    SLS  unable on L  -CK    Rhomberg  unable without AD  -CK    Sharpened Rhomberg  unable without assist  -CK       Transfers    Sit-Stand [place] (Transfers)  conditional independence  -CK    Stand-Sit [place] (Transfers)  conditional independence  -CK    Transfers, Sit-Stand-Sit, Assist Device  rolling walker  -CK       Gait/Stairs Assessment/Training    Gait/Stairs Assessment/Training  gait/ambulation independence  -CK    [place] Level (Gait)  conditional independence  -CK    Assistive Device (Gait)  walker, front-wheeled  -CK    Pattern (Gait)  step-to  -CK    Deviations/Abnormal Patterns (Gait)  antalgic;gait speed decreased;stride length decreased  -CK    Left Sided Gait Deviations  heel strike decreased;weight shift ability decreased  -CK    Handrail Location (Stairs)  right side (ascending)  -CK    Number of Steps (Stairs)  4  -CK    Ascending Technique (Stairs)  step-to-step  -CK    Descending Technique (Stairs)  step-to-step  -CK      User Key  (r) = Recorded By, (t) = Taken By, (c) = Cosigned By    Initials Name Provider Type    CK Rahul Valdovinos, PT Physical Therapist                      PT Assessment/Plan     Row Name 11/28/18 1108 11/27/18 1406       PT Assessment    Functional Limitations  --  Decreased safety during functional activities;Impaired gait;Limitation in home management;Limitations in community activities;Performance in work activities;Performance in sport activities;Performance in self-care ADL;Limitations in functional capacity and performance;Performance in leisure activities  -CK    Impairments  --  Balance;Gait;Impaired flexibility;Muscle strength;Pain;Range of motion;Motor function;Endurance;Edema  -CK    Assessment Comments   "Mr. Biggs was in a lot of pain today during entire session, but most particularly in supine position with L LE in full extension.  E-stim(IFC) did not seem to help him relax or get comfortable nor did the support of one pillow under the knee.  Pt. also had difficulty tolerating ice massage and soft tissue massage.  He continues to have min. to no quad contraction.  He was able to get foot on lower step for flexion stretch without too much pain.  Gait improved with cuing for heel strike- toe off and eliminating hip hike (with walker) He was actually able to weight shift a little, and decrease his limp.  -LP  Mr. Biggs is a 63 year old male who presents to clinic s/p L TKR performed on 11/8/18. He reports of PMH of R TKR (2016) with no adverse effects. No personal factors that effect plan of care reported at time of evaluation. He presents today with RWx for ambulation and friend to drive him. He reports \"worsening\" ability to bend his knee the last week. Observation of L knee revealed healing blisters most likely from taping/bandaging along lateral aspect of knee. Knee remains swollen and warm to touch. Atrophy of L quad noted. Poor ability to activate quad musculature with quad set. Antalgic gait pattern noted on L with lack of heel strike and proper knee flexion/extension mechanics. AROM (25)-60 with significant pain. MMT 2-/5 due to lack of ability to complete range even with gravity eliminated. Unable to lift LLE onto/off of table without use of BUE due to lack of hip flexor strength. Mr. Biggs self scored a 21/80 on the Knee Outcome Survey (where 80/80= no deficits). He is appropriate for skilled therapy services at this time to address the aforementioned deficits in order to allow for improved function and independence with all mobility tasks.   -CK    Please refer to paper survey for additional self-reported information  Yes  -LP  Yes  -CK    Rehab Potential  Fair  -LP  Good  -CK    Patient/caregiver " participated in establishment of treatment plan and goals  Yes  -LP  Yes  -CK    Patient would benefit from skilled therapy intervention  Yes  -LP  Yes  -CK       PT Plan    PT Frequency  3x/week  -LP  2x/week;3x/week  -CK    Predicted Duration of Therapy Intervention (Therapy Eval)  6 weeks  -LP  12 weeks  -CK    Planned CPT's?  --  PT EVAL MOD COMPLELITY: 29260;PT THER PROC EA 15 MIN: 63573;PT MANUAL THERAPY EA 15 MIN: 26531;PT NEUROMUSC RE-EDUCATION EA 15 MIN: 47380;PT ELECTRICAL STIM UNATTEND: ;PT ELECTRICAL STIM ATTD EA 15 MIN: 36975;PT HOT/COLD PACK WC NONMCARE: 99761  -CK    Physical Therapy Interventions (Optional Details)  --  balance training;bed mobility training;dry needling;gait training;gross motor skills;neuromuscular re-education;stretching;strengthening;stair training;modalities;manual therapy techniques;ROM (Range of Motion);patient/family education;home exercise program  -CK    PT Plan Comments  continue with modalities to help pain control, progress as able with quad strengthening and ROM  -LP  Nustep warm up. Use NMES to activate quad tissue during exercises for TKR. Add Calf stretch/HS stretch, seated HS curls or isometrics. Patient to bring home tens unit for instruction of use at home to engage quad tissue.   -CK      User Key  (r) = Recorded By, (t) = Taken By, (c) = Cosigned By    Initials Name Provider Type    CK Rahul Valdovinos, PT Physical Therapist    LP Adri Garcia, PT Physical Therapist          Modalities     Row Name 11/28/18 0900 11/27/18 1300          Ice    Ice Applied  Yes  -LP  Yes  -CK     Location  5 different pointsaround L knee  -LP  L knee: wrapped around, over bolster  -CK     Rx Minutes  10 mins  -LP  15 mins  -CK     Ice Prior to Rx  Yes did between Nustep and quad activity  -LP  --     Ice S/P Rx  --  Yes  -CK        ELECTRICAL STIMULATION    Attended/Unattended  Unattended  -LP  Unattended  -CK     Stimulation Type  IFC  -LP  IFC  -CK     Max mAmp  17.8  -LP  " 17.8  -CK     Location/Electrode Placement/Other  jayy cross L knee and over quad  -LP  jayy cross L knee and over quad  -CK      PT E-Stim Unattended (Manual) Minutes  15 attempted some active quad firing  -LP  15  -CK       User Key  (r) = Recorded By, (t) = Taken By, (c) = Cosigned By    Initials Name Provider Type    CK Rahul Valdovinos, PT Physical Therapist    LP Adri Garcia, PT Physical Therapist          Exercises     Row Name 11/28/18 1100 11/27/18 1400          Subjective Comments    Subjective Comments   ' im not doing good\"  -LP  --        Subjective Pain    Able to rate subjective pain?  yes  -LP  --     Pre-Treatment Pain Level  8 pt breathing very hard during most of session  -LP  --     Post-Treatment Pain Level  8  -LP  --        Total Minutes    00152 - PT Therapeutic Exercise Minutes  15  -LP  20  -CK     26248 - PT Manual Therapy Minutes  10  -LP  --        Exercise 1    Exercise Name 1  Quad set  -LP  Quad set  -CK     Reps 1  10  -LP  10  -CK     Time 1  3 sec  -LP  5 sec  -CK        Exercise 2    Exercise Name 2  --  CKC quad set, ball on wall  -CK     Reps 2  --  10  -CK     Time 2  --  5 sec  -CK        Exercise 3    Exercise Name 3  Heel slide with manual assist  -LP  Heel slide, sheet around foot  -CK     Reps 3  10  -LP  10  -CK     Additional Comments  supine  -LP  supine  -CK        Exercise 4    Exercise Name 4  --  AA SAQ  -CK     Reps 4  --  15  -CK        Exercise 5    Exercise Name 5  Sitting EOB, letting knee dangle a little heel slide in sitting also  -LP  Sitting EOB, letting knee dangle  -CK        Exercise 6    Exercise Name 6  Nustep,   -LP  Nustep, no resistance  -CK     Time 6  5  -LP  6 min  -CK     Additional Comments  with arms L4  -LP  use arms to pull and bend knee  -CK        Exercise 7    Exercise Name 7  gait; heel- toe with walker  -LP  --     Time 7  100 ft  -LP  --     Additional Comments  cuing to not hike hip  -LP  --       User Key  (r) = Recorded " By, (t) = Taken By, (c) = Cosigned By    Initials Name Provider Type    CK Rahul Valdovinos S, PT Physical Therapist    LP Adri Garcia PT Physical Therapist                        Manual Rx (last 36 hours)      Manual Treatments     Row Name 11/28/18 1100             Total Minutes    22576 - PT Manual Therapy Minutes  10  -LP         Manual Rx 1    Manual Rx 1 Location  L knee and thigh  -LP      Manual Rx 1 Type  STM  -LP      Manual Rx 1 Grade  moderate  -LP      Manual Rx 1 Duration  10 min  -LP        User Key  (r) = Recorded By, (t) = Taken By, (c) = Cosigned By    Initials Name Provider Type    LP Adri Garcia PT Physical Therapist          PT OP Goals     Row Name 11/27/18 1300          PT Short Term Goals    STG 1  Patient will be independent and compliant with initial home exercise program  -CK     STG 2  Patient will be independent with patellar and scar tissue mobilization techniques.  -CK     STG 3  Patient will be independent with education for symptom management, joint protection and strategies to minimize stress on affected tissues  -CK     STG 4  Patient will demonstrate L knee active flexion to >/= 95 degrees  -CK     STG 5  Patient will be able to bring L leg onto/off of bed and into/out of car without use of B hands.   -CK        Long Term Goals    LTG 1  Patient will be independent and compliant with advanced home exercise program to facilitate self-management of symptoms  -CK     LTG 2  Patient will ambulate with least restrictive assistive device with near normal gait on level ground short community distances  -CK     LTG 3  Patient will demonstrate improved L knee flexion to at least 110 degrees and full knee extension for improved AROM and ease with ADLs.  -CK     LTG 4  Patient will negotiate stairs reciprocally with rail support as needed.  -CK     LTG 5  Patient will demonstrate 4+/5 L knee strength (flexion/extension) for improved function and ease with return to deficit free  ambulation and stair negotiation.  -CK       User Key  (r) = Recorded By, (t) = Taken By, (c) = Cosigned By    Initials Name Provider Type    Rahul Quinonez, PT Physical Therapist          Therapy Education  Education Details: Instructed pt in use of his home TENS unit.  Also explaining how much he is compensating with his hip and how to avoid it.  Given: HEP, Pain management  Program: Reinforced  How Provided: Verbal, Demonstration  Provided to: Patient  Level of Understanding: Teach back education performed              Time Calculation:   Start Time: 1000  Stop Time: 1045  Time Calculation (min): 45 min  Therapy Suggested Charges     Code   Minutes Charges    73916 (CPT®) Hc Pt Neuromusc Re Education Ea 15 Min      50849 (CPT®) Hc Pt Ther Proc Ea 15 Min 15 1    37084 (CPT®) Hc Gait Training Ea 15 Min      67960 (CPT®) Hc Pt Therapeutic Act Ea 15 Min      61145 (CPT®) Hc Pt Manual Therapy Ea 15 Min 10 1    02280 (CPT®) Hc Pt Ther Massage- Per 15 Min      16963 (CPT®) Hc Pt Iontophoresis Ea 15 Min      44449 (CPT®) Hc Pt Elec Stim Ea-Per 15 Min      79149 (CPT®) Hc Pt Ultrasound Ea 15 Min      47107 (CPT®) Hc Pt Self Care/Mgmt/Train Ea 15 Min      74150 (CPT®) Hc Pt Prosthetic (S) Train Initial Encounter, Each 15 Min      45083 (CPT®) Hc Orthotic(S) Mgmt/Train Initial Encounter, Each 15min      97001 (CPT®) Hc Pt Aquatic Therapy Ea 15 Min      00038 (CPT®) Hc Pt Orthotic(S)/Prosthetic(S) Encounter, Each 15 Min       (CPT®) Hc Pt Electrical Stim Unattended 15 1    Total  40 3        Therapy Charges for Today     Code Description Service Date Service Provider Modifiers Qty    63781589510 HC PT THER PROC EA 15 MIN 11/28/2018 Adri Garcia, PT GP 1    77721482243 HC PT MANUAL THERAPY EA 15 MIN 11/28/2018 Adri Garcia, PT GP 1    76888058709 HC PT ELECTRICAL STIM UNATTENDED 11/28/2018 Adri Garcia, PT  1                    Adri Garcia PT  11/28/2018

## 2018-11-29 ENCOUNTER — HOSPITAL ENCOUNTER (OUTPATIENT)
Dept: PHYSICAL THERAPY | Facility: HOSPITAL | Age: 63
Setting detail: THERAPIES SERIES
Discharge: HOME | End: 2018-11-29

## 2018-11-29 DIAGNOSIS — M25.562 CHRONIC PAIN OF LEFT KNEE: Primary | ICD-10-CM

## 2018-11-29 DIAGNOSIS — Z78.9 IMPAIRED MOBILITY AND ACTIVITIES OF DAILY LIVING: ICD-10-CM

## 2018-11-29 DIAGNOSIS — G89.29 CHRONIC PAIN OF LEFT KNEE: Primary | ICD-10-CM

## 2018-11-29 DIAGNOSIS — Z74.09 IMPAIRED MOBILITY AND ACTIVITIES OF DAILY LIVING: ICD-10-CM

## 2018-11-29 DIAGNOSIS — Z96.652 STATUS POST LEFT KNEE REPLACEMENT: ICD-10-CM

## 2018-11-29 DIAGNOSIS — Z47.89 ORTHOPEDIC AFTERCARE: ICD-10-CM

## 2018-11-29 PROCEDURE — G0283 ELEC STIM OTHER THAN WOUND: HCPCS

## 2018-11-29 PROCEDURE — 97110 THERAPEUTIC EXERCISES: CPT

## 2018-11-29 NOTE — THERAPY TREATMENT NOTE
Outpatient Physical Therapy Ortho Treatment Note  [facility]     Patient Name: Bob Biggs Sr.  : 1955  MRN: [id number]  Today's Date: 2018      Visit Date: 2018    Visit Dx:    ICD-10-CM ICD-9-CM   1. Chronic pain of left knee M25.562 719.46    G89.29 338.29   2. Orthopedic aftercare Z47.89 V54.9   3. Impaired mobility and activities of daily living Z74.09 799.89   4. Status post left knee replacement Z96.652 V43.65       Patient Active Problem List   Diagnosis   • Osteoarthritis of right knee   • Status post total right knee replacement   • Diabetes (CMS/HCC)   • Arthritis   • Arthritis   • GERD (gastroesophageal reflux disease)   • Chronic pain of left knee   • Osteoarthritis of left knee   • Decreased mobility        Past Medical History:   Diagnosis Date   • Arthritis    • Diabetes mellitus (CMS/HCC)    • Finger fracture, left 2018   • GERD (gastroesophageal reflux disease)    • Painful swelling of joint    • Tear of meniscus of knee         Past Surgical History:   Procedure Laterality Date   • COLONOSCOPY     • HYDROCELECTOMY      late    • KNEE ARTHROSCOPY Left ,    • KNEE SURGERY  2016    Right TKA       PT Ortho     Row Name 18 1300       Posture/Observations    Observations  Edema;Erythema;Incision healing  -CK    Posture/Observations Comments  Healing blisters on lateral side of L knee from bandage/tape  -CK       Left Lower Ext    Lt Hip Flexion AROM  95 in supine  -CK    Lt Hip Flexion PROM  full  -CK    Lt Knee Extension/Flexion AROM  (25)-60  -CK    Lt Knee Extension/Flexion PROM  (10)-65  -CK       MMT (Manual Muscle Testing)    Lt Lower Ext  Lt Hip Flexion;Lt Knee Extension;Lt Knee Flexion  -CK       MMT Left Lower Ext    Lt Hip Flexion MMT, Gross Movement  (3-/5) fair minus  -CK    Lt Knee Extension MMT, Gross Movement  (2-/5) poor minus  -CK    Lt Knee Flexion MMT, Gross Movement  (2-/5) poor minus  -CK       Flexibility     Flexibility Tested?  Lower Extremity  -CK       Lower Extremity Flexibility    Hamstrings  Left:;Moderately limited  -CK    Quadriceps  Left:;Moderately limited  -CK    Gastrocnemius  Left:;Moderately limited  -CK       Balance Skills Training    SLS  unable on L  -CK    Rhomberg  unable without AD  -CK    Sharpened Rhomberg  unable without assist  -CK       Transfers    Sit-Stand [place] (Transfers)  conditional independence  -CK    Stand-Sit [place] (Transfers)  conditional independence  -CK    Transfers, Sit-Stand-Sit, Assist Device  rolling walker  -CK       Gait/Stairs Assessment/Training    Gait/Stairs Assessment/Training  gait/ambulation independence  -CK    [place] Level (Gait)  conditional independence  -CK    Assistive Device (Gait)  walker, front-wheeled  -CK    Pattern (Gait)  step-to  -CK    Deviations/Abnormal Patterns (Gait)  antalgic;gait speed decreased;stride length decreased  -CK    Left Sided Gait Deviations  heel strike decreased;weight shift ability decreased  -CK    Handrail Location (Stairs)  right side (ascending)  -CK    Number of Steps (Stairs)  4  -CK    Ascending Technique (Stairs)  step-to-step  -CK    Descending Technique (Stairs)  step-to-step  -CK      User Key  (r) = Recorded By, (t) = Taken By, (c) = Cosigned By    Initials Name Provider Type    CK Rahul Valdovinos, PT Physical Therapist                      PT Assessment/Plan     Row Name 11/29/18 1239          PT Assessment    Assessment Comments  Pain decreased to 6. Discussed and performed ice massage to decrease pain. Added Russian estim to stimulate VMO. Passive flex 65. Pain limiting manual  -WS       User Key  (r) = Recorded By, (t) = Taken By, (c) = Cosigned By    Initials Name Provider Type    Domingo Sanchez PTA Physical Therapy Assistant          Modalities     Row Name 11/29/18 1200             Ice    Ice Applied  Yes  -WS      Location  -- ice massage followed by ice wrap  -WS      Rx Minutes  12  mins  -WS      Ice S/P Rx  Yes  -WS         ELECTRICAL STIMULATION    Attended/Unattended  Unattended  -WS      Stimulation Type  -- Russion cycle time 5/5  -WS      Max mAmp  53  -WS      Location/Electrode Placement/Other  left quad QS seated in ball  -WS       PT E-Stim Unattended (Manual) Minutes  10  -WS        User Key  (r) = Recorded By, (t) = Taken By, (c) = Cosigned By    Initials Name Provider Type     Domingo Hill PTA Physical Therapy Assistant          Exercises     Row Name 11/29/18 1200             Subjective Pain    Able to rate subjective pain?  yes  -WS      Pre-Treatment Pain Level  6  -WS         Total Minutes    74616 - PT Therapeutic Exercise Minutes  25  -WS         Exercise 1    Exercise Name 1  seated QS in ball with estim  -WS         Exercise 2    Exercise Name 2  calf raise  -WS      Reps 2  20  -WS         Exercise 5    Exercise Name 5  Add standing TKE  -WS         Exercise 6    Exercise Name 6  Nustep, UE/LE  -WS      Time 6  5  -WS      Additional Comments  L5  -WS         Exercise 7    Exercise Name 7  hip abd  -WS      Time 7  20  -WS         Exercise 8    Exercise Name 8  standing HS curl  -WS      Reps 8  20  -WS         Exercise 9    Exercise Name 9  stair stretch  -WS      Reps 9  3  -WS      Time 9  20  -WS         Exercise 10    Exercise Name 10  SAQ  -WS      Reps 10  15  -WS        User Key  (r) = Recorded By, (t) = Taken By, (c) = Cosigned By    Initials Name Provider Type     Domingo Hill PTA Physical Therapy Assistant                        Manual Rx (last 36 hours)      Manual Treatments     Row Name 11/28/18 1100             Total Minutes    74615 - PT Manual Therapy Minutes  10  -LP         Manual Rx 1    Manual Rx 1 Location  L knee and thigh  -LP      Manual Rx 1 Type  STM  -LP      Manual Rx 1 Grade  moderate  -LP      Manual Rx 1 Duration  10 min  -LP        User Key  (r) = Recorded By, (t) = Taken By, (c) = Cosigned By    Initials Name Provider  Type    LP Adri Garcia, PT Physical Therapist          PT OP Goals     Row Name 11/29/18 1200          PT Short Term Goals    STG 1  Patient will be independent and compliant with initial home exercise program  -     STG 1 Progress  Ongoing  -     STG 2  Patient will be independent with patellar and scar tissue mobilization techniques.  -     STG 2 Progress  Ongoing  -     STG 3  Patient will be independent with education for symptom management, joint protection and strategies to minimize stress on affected tissues  -     STG 3 Progress  Ongoing  -     STG 4  Patient will demonstrate L knee active flexion to >/= 95 degrees  -     STG 5  Patient will be able to bring L leg onto/off of bed and into/out of car without use of B hands.   -        Long Term Goals    LTG 1  Patient will be independent and compliant with advanced home exercise program to facilitate self-management of symptoms  -     LTG 2  Patient will ambulate with least restrictive assistive device with near normal gait on level ground short community distances  -     LTG 3  Patient will demonstrate improved L knee flexion to at least 110 degrees and full knee extension for improved AROM and ease with ADLs.  -     LTG 4  Patient will negotiate stairs reciprocally with rail support as needed.  -     LTG 5  Patient will demonstrate 4+/5 L knee strength (flexion/extension) for improved function and ease with return to deficit free ambulation and stair negotiation.  -       User Key  (r) = Recorded By, (t) = Taken By, (c) = Cosigned By    Initials Name Provider Type    Domingo Sanchez PTA Physical Therapy Assistant          Therapy Education  Given: HEP, Symptoms/condition management, Pain management, Posture/body mechanics, Edema management              Time Calculation:   Start Time: 1200  Stop Time: 1240  Time Calculation (min): 40 min  Therapy Suggested Charges     Code   Minutes Charges    01480 (CPT®)  Pt  Neuromusc Re Education Ea 15 Min      64916 (CPT®) Hc Pt Ther Proc Ea 15 Min 25 2    99678 (CPT®) Hc Gait Training Ea 15 Min      00950 (CPT®) Hc Pt Therapeutic Act Ea 15 Min      47830 (CPT®) Hc Pt Manual Therapy Ea 15 Min      57420 (CPT®) Hc Pt Ther Massage- Per 15 Min      85658 (CPT®) Hc Pt Iontophoresis Ea 15 Min      69889 (CPT®) Hc Pt Elec Stim Ea-Per 15 Min      90076 (CPT®) Hc Pt Ultrasound Ea 15 Min      39819 (CPT®) Hc Pt Self Care/Mgmt/Train Ea 15 Min      60367 (CPT®) Hc Pt Prosthetic (S) Train Initial Encounter, Each 15 Min      16987 (CPT®) Hc Orthotic(S) Mgmt/Train Initial Encounter, Each 15min      66892 (CPT®) Hc Pt Aquatic Therapy Ea 15 Min      94077 (CPT®) Hc Pt Orthotic(S)/Prosthetic(S) Encounter, Each 15 Min       (CPT®) Hc Pt Electrical Stim Unattended 10     Total  35 2        Therapy Charges for Today     Code Description Service Date Service Provider Modifiers Qty    17113045982 HC PT THER PROC EA 15 MIN 11/29/2018 Domingo Hill PTA GP 2    84764060294 HC PT ELECTRICAL STIM UNATTENDED 11/29/2018 Domingo Hill, PTA  1    51575569879 HC PT HOT OR COLD PACK TREAT MCARE 11/29/2018 Domingo Hill PTA GP 1                    Domingo Hill PTA  11/29/2018

## 2018-12-03 ENCOUNTER — HOSPITAL ENCOUNTER (OUTPATIENT)
Dept: PHYSICAL THERAPY | Facility: HOSPITAL | Age: 63
Setting detail: THERAPIES SERIES
Discharge: HOME | End: 2018-12-03

## 2018-12-03 DIAGNOSIS — Z96.652 STATUS POST LEFT KNEE REPLACEMENT: ICD-10-CM

## 2018-12-03 DIAGNOSIS — M25.562 CHRONIC PAIN OF LEFT KNEE: Primary | ICD-10-CM

## 2018-12-03 DIAGNOSIS — Z78.9 IMPAIRED MOBILITY AND ACTIVITIES OF DAILY LIVING: ICD-10-CM

## 2018-12-03 DIAGNOSIS — Z74.09 IMPAIRED MOBILITY AND ACTIVITIES OF DAILY LIVING: ICD-10-CM

## 2018-12-03 DIAGNOSIS — Z47.89 ORTHOPEDIC AFTERCARE: ICD-10-CM

## 2018-12-03 DIAGNOSIS — G89.29 CHRONIC PAIN OF LEFT KNEE: Primary | ICD-10-CM

## 2018-12-03 PROCEDURE — 97110 THERAPEUTIC EXERCISES: CPT

## 2018-12-03 PROCEDURE — G0283 ELEC STIM OTHER THAN WOUND: HCPCS

## 2018-12-03 NOTE — THERAPY TREATMENT NOTE
Outpatient Physical Therapy Ortho Treatment Note  [facility]     Patient Name: Bob Biggs Sr.  : 1955  MRN: [id number]  Today's Date: 12/3/2018      Visit Date: 2018    Visit Dx:    ICD-10-CM ICD-9-CM   1. Chronic pain of left knee M25.562 719.46    G89.29 338.29   2. Orthopedic aftercare Z47.89 V54.9   3. Impaired mobility and activities of daily living Z74.09 799.89   4. Status post left knee replacement Z96.652 V43.65       Patient Active Problem List   Diagnosis   • Osteoarthritis of right knee   • Status post total right knee replacement   • Diabetes (CMS/HCC)   • Arthritis   • Arthritis   • GERD (gastroesophageal reflux disease)   • Chronic pain of left knee   • Osteoarthritis of left knee   • Decreased mobility        Past Medical History:   Diagnosis Date   • Arthritis    • Diabetes mellitus (CMS/HCC)    • Finger fracture, left 2018   • GERD (gastroesophageal reflux disease)    • Painful swelling of joint    • Tear of meniscus of knee         Past Surgical History:   Procedure Laterality Date   • COLONOSCOPY     • HYDROCELECTOMY      late    • KNEE ARTHROSCOPY Left ,    • KNEE SURGERY  2016    Right TKA                       PT Assessment/Plan     Row Name 18 1639          PT Assessment    Assessment Comments  Patient improving with bed mobility with left LE. Decreased knee pain.Added wall slide to improve knee flexion. Passive flex 65 degrees.   -       User Key  (r) = Recorded By, (t) = Taken By, (c) = Cosigned By    Initials Name Provider Type     Domingo Hill PTA Physical Therapy Assistant          Modalities     Row Name 18 1605             Ice    Ice Applied  Yes  -WS      Location  left knee on 2 pillows ice massage follwed by ice wrap  -      Rx Minutes  12 mins  -WS      Ice S/P Rx  Yes  -WS         ELECTRICAL STIMULATION    Attended/Unattended  Unattended  -      Stimulation Type  -- Russian cycle   -      Max  mAmp  43  -WS      Location/Electrode Placement/Other  left quad QS seated in ball  -WS       PT E-Stim Unattended (Manual) Minutes  10  -WS        User Key  (r) = Recorded By, (t) = Taken By, (c) = Cosigned By    Initials Name Provider Type    Domingo Sanchez PTA Physical Therapy Assistant          Exercises     Row Name 12/03/18 1605             Subjective Comments    Subjective Comments  Quad working a little better  -WS         Subjective Pain    Able to rate subjective pain?  yes  -WS      Pre-Treatment Pain Level  4  -WS         Total Minutes    26327 - PT Therapeutic Exercise Minutes  30  -WS         Exercise 1    Exercise Name 1  seated QS in ball with estim  -WS      Time 1  10 min  -WS         Exercise 2    Exercise Name 2  calf raise  -WS      Reps 2  20  -WS         Exercise 3    Exercise Name 3  wall slide  -WS      Reps 3  10  -WS      Time 3  5 sec  -WS         Exercise 5    Exercise Name 5  standing TKE  -WS      Reps 5  15  -WS      Additional Comments  BTB  -WS         Exercise 6    Exercise Name 6  Nustep, UE/LE  -WS      Time 6  5  -WS      Additional Comments  L5  -WS         Exercise 7    Exercise Name 7  hip abd L  -WS      Time 7  20  -WS      Additional Comments  2#  -WS         Exercise 8    Exercise Name 8  standing HS curl L   -WS      Reps 8  20  -WS      Additional Comments  2#  -WS         Exercise 9    Exercise Name 9  stair stretch  -WS      Reps 9  3  -WS      Time 9  20  -WS         Exercise 10    Exercise Name 10  SAQ  -WS      Reps 10  15  -WS        User Key  (r) = Recorded By, (t) = Taken By, (c) = Cosigned By    Initials Name Provider Type    Domingo Sanchez PTA Physical Therapy Assistant                         PT OP Goals     Row Name 12/03/18 1600          PT Short Term Goals    STG 1  Patient will be independent and compliant with initial home exercise program  -WS     STG 1 Progress  Met  -WS     STG 2  Patient will be independent with patellar and scar  tissue mobilization techniques.  -WS     STG 2 Progress  Ongoing  -WS     STG 3  Patient will be independent with education for symptom management, joint protection and strategies to minimize stress on affected tissues  -WS     STG 3 Progress  Ongoing  -WS     STG 4  Patient will demonstrate L knee active flexion to >/= 95 degrees  -WS     STG 4 Progress  Ongoing  -WS     STG 5  Patient will be able to bring L leg onto/off of bed and into/out of car without use of B hands.   -     STG 5 Progress  Ongoing  -        Long Term Goals    LTG 1  Patient will be independent and compliant with advanced home exercise program to facilitate self-management of symptoms  -WS     LTG 2  Patient will ambulate with least restrictive assistive device with near normal gait on level ground short community distances  -WS     LTG 3  Patient will demonstrate improved L knee flexion to at least 110 degrees and full knee extension for improved AROM and ease with ADLs.  -WS     LTG 4  Patient will negotiate stairs reciprocally with rail support as needed.  -WS     LTG 5  Patient will demonstrate 4+/5 L knee strength (flexion/extension) for improved function and ease with return to deficit free ambulation and stair negotiation.  -       User Key  (r) = Recorded By, (t) = Taken By, (c) = Cosigned By    Initials Name Provider Type    Domingo Sanchez PTA Physical Therapy Assistant          Therapy Education  Given: HEP, Edema management, Pain management, Symptoms/condition management  Program: Reinforced  How Provided: Verbal  Provided to: Patient              Time Calculation:   Start Time: 1605  Stop Time: 1657  Time Calculation (min): 52 min  Therapy Suggested Charges     Code   Minutes Charges    91277 (CPT®) Hc Pt Neuromusc Re Education Ea 15 Min      96999 (CPT®) Hc Pt Ther Proc Ea 15 Min 30 2    54647 (CPT®) Hc Gait Training Ea 15 Min      09400 (CPT®) Hc Pt Therapeutic Act Ea 15 Min      48338 (CPT®) Hc Pt Manual Therapy Ea  15 Min      74720 (CPT®) Hc Pt Ther Massage- Per 15 Min      15427 (CPT®) Hc Pt Iontophoresis Ea 15 Min      48937 (CPT®) Hc Pt Elec Stim Ea-Per 15 Min      31383 (CPT®) Hc Pt Ultrasound Ea 15 Min      78714 (CPT®) Hc Pt Self Care/Mgmt/Train Ea 15 Min      65600 (CPT®) Hc Pt Prosthetic (S) Train Initial Encounter, Each 15 Min      57775 (CPT®) Hc Orthotic(S) Mgmt/Train Initial Encounter, Each 15min      79776 (CPT®) Hc Pt Aquatic Therapy Ea 15 Min      55688 (CPT®) Hc Pt Orthotic(S)/Prosthetic(S) Encounter, Each 15 Min       (CPT®) Hc Pt Electrical Stim Unattended 10 1    Total  40 3        Therapy Charges for Today     Code Description Service Date Service Provider Modifiers Qty    92861667094 HC PT THER PROC EA 15 MIN 12/3/2018 Domingo Hill PTA GP 2    65164209497 HC PT ELECTRICAL STIM UNATTENDED 12/3/2018 Domingo Hill PTA  1    11900520562 HC PT HOT OR COLD PACK TREAT MCARE 12/3/2018 Domingo Hill PTA GP 1                    Domingo Hill PTA  12/3/2018

## 2018-12-05 ENCOUNTER — HOSPITAL ENCOUNTER (OUTPATIENT)
Dept: PHYSICAL THERAPY | Facility: HOSPITAL | Age: 63
Setting detail: THERAPIES SERIES
Discharge: HOME | End: 2018-12-05

## 2018-12-05 DIAGNOSIS — Z74.09 IMPAIRED MOBILITY AND ACTIVITIES OF DAILY LIVING: ICD-10-CM

## 2018-12-05 DIAGNOSIS — Z96.652 STATUS POST LEFT KNEE REPLACEMENT: ICD-10-CM

## 2018-12-05 DIAGNOSIS — G89.29 CHRONIC PAIN OF LEFT KNEE: Primary | ICD-10-CM

## 2018-12-05 DIAGNOSIS — Z78.9 IMPAIRED MOBILITY AND ACTIVITIES OF DAILY LIVING: ICD-10-CM

## 2018-12-05 DIAGNOSIS — Z47.89 ORTHOPEDIC AFTERCARE: ICD-10-CM

## 2018-12-05 DIAGNOSIS — M25.562 CHRONIC PAIN OF LEFT KNEE: Primary | ICD-10-CM

## 2018-12-05 PROCEDURE — 97110 THERAPEUTIC EXERCISES: CPT | Performed by: PHYSICAL THERAPIST

## 2018-12-05 PROCEDURE — 97140 MANUAL THERAPY 1/> REGIONS: CPT | Performed by: PHYSICAL THERAPIST

## 2018-12-05 PROCEDURE — G0283 ELEC STIM OTHER THAN WOUND: HCPCS | Performed by: PHYSICAL THERAPIST

## 2018-12-05 NOTE — THERAPY TREATMENT NOTE
Outpatient Physical Therapy Ortho Treatment Note  [facility]     Patient Name: Bob Biggs Sr.  : 1955  MRN: [id number]  Today's Date: 2018      Visit Date: 2018    Visit Dx:    ICD-10-CM ICD-9-CM   1. Chronic pain of left knee M25.562 719.46    G89.29 338.29   2. Orthopedic aftercare Z47.89 V54.9   3. Impaired mobility and activities of daily living Z74.09 799.89   4. Status post left knee replacement Z96.652 V43.65       Patient Active Problem List   Diagnosis   • Osteoarthritis of right knee   • Status post total right knee replacement   • Diabetes (CMS/HCC)   • Arthritis   • Arthritis   • GERD (gastroesophageal reflux disease)   • Chronic pain of left knee   • Osteoarthritis of left knee   • Decreased mobility        Past Medical History:   Diagnosis Date   • Arthritis    • Diabetes mellitus (CMS/HCC)    • Finger fracture, left 2018   • GERD (gastroesophageal reflux disease)    • Painful swelling of joint    • Tear of meniscus of knee         Past Surgical History:   Procedure Laterality Date   • COLONOSCOPY     • HYDROCELECTOMY      late    • KNEE ARTHROSCOPY Left ,    • KNEE SURGERY  2016    Right TKA                       PT Assessment/Plan     Row Name 18 1222          PT Assessment    Assessment Comments  Mr. Biggs is 4 weeks s/p L TKA.  He demonstrates significant improvement in gait pattern, continuing to use his RW.  He is able to transfer onto/off plinth without use of BUE for LLE.  We progressed his exercises today, performed STM to L quad/HS tissues.  He demosntrates increased temp from L to R knee and is encoruaged to continue icing.  Mr. Biggs is progressign toward all functioanl goals and remains a good candidate for skilled physical therapy.   -GJ        PT Plan    PT Plan Comments  continue to work on L knee ROM, L quad activation, strengthening, gait normalization.  Continue with functional electrical stimulation to L quad  -GJ        User Key  (r) = Recorded By, (t) = Taken By, (c) = Cosigned By    Initials Name Provider Type    Felix Roy, PT Physical Therapist          Modalities     Row Name 12/05/18 0900             Ice    Ice Applied  Yes  -GJ      Location  left knee on 2 pillows  -GJ      Rx Minutes  10 mins  -GJ      Ice S/P Rx  Yes  -GJ         ELECTRICAL STIMULATION    Attended/Unattended  Unattended  -GJ      Stimulation Type  FES Russian  -GJ      Location/Electrode Placement/Other  L quad, pt. seated, performing TKE into ball during on cycle (10 seconds on, 50 seconds off)  -GJ       PT E-Stim Unattended (Manual) Minutes  10  -GJ        User Key  (r) = Recorded By, (t) = Taken By, (c) = Cosigned By    Initials Name Provider Type    Felix Roy, PT Physical Therapist          Exercises     Row Name 12/05/18 0900             Subjective Comments    Subjective Comments  I'm sore this morning, I haven't had a chance to work it out yet, I am feeling better though.   -GJ         Subjective Pain    Pre-Treatment Pain Level  5  -GJ         Total Minutes    09036 - PT Therapeutic Exercise Minutes  30  -GJ      06182 - PT Manual Therapy Minutes  12  -GJ         Exercise 1    Exercise Name 1  seated QS in ball with estim  -GJ      Time 1  10 min  -GJ      Additional Comments  volitional contraction during on phase  -GJ         Exercise 2    Exercise Name 2  calf raise  -GJ      Cueing 2  Verbal  -GJ      Reps 2  20  -GJ         Exercise 3    Exercise Name 3  wall slide  -GJ      Cueing 3  Verbal;Demo  -GJ      Reps 3  10  -GJ      Time 3  5  -GJ      Additional Comments  s  -GJ         Exercise 4    Exercise Name 4  mini squat  -GJ      Cueing 4  Verbal;Demo  -GJ      Reps 4  15  -GJ      Additional Comments  cues for equal wt. bearing   -GJ         Exercise 5    Exercise Name 5  standing TKE  -GJ      Cueing 5  Verbal;Demo  -GJ      Reps 5  15  -GJ      Time 5  5s  -GJ      Additional Comments  BTB  -GJ          Exercise 6    Exercise Name 6  Nustep, UE/LE  -GJ      Cueing 6  Verbal  -GJ      Time 6  5  -GJ         Exercise 7    Exercise Name 7  hip abd L  -GJ      Cueing 7  Verbal;Demo  -GJ      Time 7  20  -GJ      Additional Comments  2#  -GJ         Exercise 8    Exercise Name 8  standing HS curl L   -GJ      Cueing 8  Verbal;Demo  -GJ      Reps 8  20  -GJ      Additional Comments  2#  -GJ         Exercise 9    Exercise Name 9  stair stretch  -GJ      Reps 9  3  -GJ      Time 9  20  -GJ      Additional Comments  knee ext/flexion   -GJ         Exercise 10    Exercise Name 10  SAQ  -GJ      Cueing 10  Verbal;Demo  -GJ      Reps 10  15  -GJ      Additional Comments  2#  -GJ         Exercise 11    Exercise Name 11  negrito negrito (for gait normalization   -GJ      Cueing 11  Demo  -GJ      Time 11  2 min  -GJ        User Key  (r) = Recorded By, (t) = Taken By, (c) = Cosigned By    Initials Name Provider Type    Felix Roy, PT Physical Therapist                        Manual Rx (last 36 hours)      Manual Treatments     Row Name 12/05/18 0900             Manual Rx 1    Manual Rx 1 Location  L quad/HS tissues,  -GJ      Manual Rx 1 Type  STM  -GJ      Manual Rx 1 Grade  moderate  -GJ        User Key  (r) = Recorded By, (t) = Taken By, (c) = Cosigned By    Initials Name Provider Type    Felix Roy, PT Physical Therapist          PT OP Goals     Row Name 12/05/18 0900          PT Short Term Goals    STG 1  Patient will be independent and compliant with initial home exercise program  -GJ     STG 1 Progress  Met  -GJ     STG 2  Patient will be independent with patellar and scar tissue mobilization techniques.  -GJ     STG 2 Progress  Ongoing  -GJ     STG 2 Progress Comments  discussed scar massage today, will start next week  -GJ     STG 3  Patient will be independent with education for symptom management, joint protection and strategies to minimize stress on affected tissues  -GJ     STG 3 Progress  Partially Met  -GJ      STG 4  Patient will demonstrate L knee active flexion to >/= 95 degrees  -     STG 4 Progress  Ongoing  -     STG 5  Patient will be able to bring L leg onto/off of bed and into/out of car without use of B hands.   -     STG 5 Progress  Met  -        Long Term Goals    LTG 1  Patient will be independent and compliant with advanced home exercise program to facilitate self-management of symptoms  -     LTG 2  Patient will ambulate with least restrictive assistive device with near normal gait on level ground short community distances  -     LTG 3  Patient will demonstrate improved L knee flexion to at least 110 degrees and full knee extension for improved AROM and ease with ADLs.  -     LTG 4  Patient will negotiate stairs reciprocally with rail support as needed.  -     LTG 5  Patient will demonstrate 4+/5 L knee strength (flexion/extension) for improved function and ease with return to deficit free ambulation and stair negotiation.  -       User Key  (r) = Recorded By, (t) = Taken By, (c) = Cosigned By    Initials Name Provider Type     Felix Villa, PT Physical Therapist          Therapy Education  Education Details: discussed incision massage, will initiated next week, encouraged icing at home  Given: HEP, Symptoms/condition management, Pain management, Mobility training, Posture/body mechanics, Edema management  Program: Reinforced  How Provided: Verbal, Demonstration  Provided to: Patient  Level of Understanding: Teach back education performed, Demonstrated, Verbalized              Time Calculation:   Start Time: 0915  Stop Time: 1015  Time Calculation (min): 60 min  Therapy Suggested Charges     Code   Minutes Charges    92714 (CPT®)  Pt Neuromusc Re Education Ea 15 Min      96291 (CPT®) Hc Pt Ther Proc Ea 15 Min 30 2    57025 (CPT®) Hc Gait Training Ea 15 Min      06313 (CPT®) Hc Pt Therapeutic Act Ea 15 Min      99110 (CPT®) Hc Pt Manual Therapy Ea 15 Min 12 1    54945 (CPT®) Hc Pt  Ther Massage- Per 15 Min      49383 (CPT®) Hc Pt Iontophoresis Ea 15 Min      51970 (CPT®) Hc Pt Elec Stim Ea-Per 15 Min      24074 (CPT®) Hc Pt Ultrasound Ea 15 Min      35304 (CPT®) Hc Pt Self Care/Mgmt/Train Ea 15 Min      21737 (CPT®) Hc Pt Prosthetic (S) Train Initial Encounter, Each 15 Min      08601 (CPT®) Hc Orthotic(S) Mgmt/Train Initial Encounter, Each 15min      08932 (CPT®) Hc Pt Aquatic Therapy Ea 15 Min      48678 (CPT®) Hc Pt Orthotic(S)/Prosthetic(S) Encounter, Each 15 Min       (CPT®) Hc Pt Electrical Stim Unattended 10     Total  52 3        Therapy Charges for Today     Code Description Service Date Service Provider Modifiers Qty    86919999801 HC PT THER PROC EA 15 MIN 12/5/2018 Felix Villa, PT GP 2    17087128428 HC PT MANUAL THERAPY EA 15 MIN 12/5/2018 Felix Villa, PT GP 1    36876136717 HC PT HOT OR COLD PACK TREAT MCARE 12/5/2018 Felix Villa, PT GP 1    16209247587 HC PT ELECTRICAL STIM UNATTENDED 12/5/2018 Felix Villa, PT  1                    Felix Villa, PT  12/5/2018

## 2018-12-07 ENCOUNTER — HOSPITAL ENCOUNTER (OUTPATIENT)
Dept: PHYSICAL THERAPY | Facility: HOSPITAL | Age: 63
Setting detail: THERAPIES SERIES
Discharge: HOME | End: 2018-12-07

## 2018-12-07 DIAGNOSIS — M25.562 CHRONIC PAIN OF LEFT KNEE: ICD-10-CM

## 2018-12-07 DIAGNOSIS — Z96.652 STATUS POST LEFT KNEE REPLACEMENT: ICD-10-CM

## 2018-12-07 DIAGNOSIS — Z74.09 IMPAIRED MOBILITY AND ACTIVITIES OF DAILY LIVING: ICD-10-CM

## 2018-12-07 DIAGNOSIS — G89.29 CHRONIC PAIN OF LEFT KNEE: ICD-10-CM

## 2018-12-07 DIAGNOSIS — Z47.89 ORTHOPEDIC AFTERCARE: Primary | ICD-10-CM

## 2018-12-07 DIAGNOSIS — Z78.9 IMPAIRED MOBILITY AND ACTIVITIES OF DAILY LIVING: ICD-10-CM

## 2018-12-07 PROCEDURE — G0283 ELEC STIM OTHER THAN WOUND: HCPCS | Performed by: PHYSICAL THERAPIST

## 2018-12-07 PROCEDURE — 97110 THERAPEUTIC EXERCISES: CPT | Performed by: PHYSICAL THERAPIST

## 2018-12-07 PROCEDURE — 97112 NEUROMUSCULAR REEDUCATION: CPT | Performed by: PHYSICAL THERAPIST

## 2018-12-07 NOTE — THERAPY TREATMENT NOTE
"    Outpatient Physical Therapy Ortho Treatment Note  [facility]     Patient Name: Bob Biggs Sr.  : 1955  MRN: [id number]  Today's Date: 2018      Visit Date: 2018    Visit Dx:    ICD-10-CM ICD-9-CM   1. Orthopedic aftercare Z47.89 V54.9   2. Impaired mobility and activities of daily living Z74.09 799.89   3. Status post left knee replacement Z96.652 V43.65   4. Chronic pain of left knee M25.562 719.46    G89.29 338.29       Patient Active Problem List   Diagnosis   • Osteoarthritis of right knee   • Status post total right knee replacement   • Diabetes (CMS/HCC)   • Arthritis   • Arthritis   • GERD (gastroesophageal reflux disease)   • Chronic pain of left knee   • Osteoarthritis of left knee   • Decreased mobility        Past Medical History:   Diagnosis Date   • Arthritis    • Diabetes mellitus (CMS/HCC)    • Finger fracture, left 2018   • GERD (gastroesophageal reflux disease)    • Painful swelling of joint    • Tear of meniscus of knee         Past Surgical History:   Procedure Laterality Date   • COLONOSCOPY     • HYDROCELECTOMY      late    • KNEE ARTHROSCOPY Left ,    • KNEE SURGERY  2016    Right TKA                       PT Assessment/Plan     Row Name 18 1237          PT Assessment    Assessment Comments  Continued with use of russian stimulation for L quad activation with quad set, LAQ, and SAQ exercises. Improved independent activation of quad without supplemental stimulation observed. Continued \"tightness\" reported and observed with respect to AROM. Returns to MD for follow up on 18. Verbal cueing for heel strike with gait pattern. Improved fluidity noted with cueing.   -CK       User Key  (r) = Recorded By, (t) = Taken By, (c) = Cosigned By    Initials Name Provider Type    CK Rahul Valdovinos, PT Physical Therapist          Modalities     Row Name 18 1100             Ice    Ice Applied  Yes  -CK      Location  left knee on 2 " pillows  -CK      Rx Minutes  12 mins  -CK      Ice S/P Rx  Yes  -CK         ELECTRICAL STIMULATION    Attended/Unattended  Unattended  -CK      Stimulation Type  FES Pal[place]  -CK      Location/Electrode Placement/Other  L quad, pt. seated, performing supine/seated exercises  -CK       PT E-Stim Unattended (Manual) Minutes  12  -CK        User Key  (r) = Recorded By, (t) = Taken By, (c) = Cosigned By    Initials Name Provider Type    CK Rahul Valdovinos S, PT Physical Therapist          Exercises     Row Name 12/07/18 1000             Subjective Comments    Subjective Comments  That massage really helped last time.  -CK         Subjective Pain    Able to rate subjective pain?  yes  -CK      Pre-Treatment Pain Level  5  -CK      Post-Treatment Pain Level  7  -CK         Total Minutes    65598 - PT Therapeutic Exercise Minutes  30  -CK      48167 -  PT Neuromuscular Reeducation Minutes  15  -CK         Exercise 1    Exercise Name 1  seated QS in ball with estim  -CK      Reps 1  20  -CK      Additional Comments  10 reps pushing with the stimulation; 10 reps pushing without the stimulation  -CK         Exercise 2    Exercise Name 2  calf raise  -CK      Cueing 2  Verbal  -CK      Reps 2  20  -CK         Exercise 3    Exercise Name 3  wall slide  -CK      Cueing 3  Verbal;Demo  -CK      Reps 3  10  -CK      Time 3  5 sec hold at bottom  -CK         Exercise 4    Exercise Name 4  mini squat  -CK      Cueing 4  Verbal;Demo  -CK      Reps 4  15  -CK      Additional Comments  equal weight  -CK         Exercise 6    Exercise Name 6  Nustep, UE/LE  -CK      Cueing 6  Verbal  -CK      Time 6  5  -CK      Additional Comments  L1, seat 10  -CK         Exercise 7    Exercise Name 7  hip abd, B  -CK      Cueing 7  Verbal;Demo  -CK      Time 7  20  -CK      Additional Comments  #2, BUE support  -CK         Exercise 8    Exercise Name 8  standing HS curl L   -CK      Cueing 8  Verbal;Demo  -CK      Reps 8  20  -CK      Additional  Comments  #2, available range  -CK         Exercise 9    Exercise Name 9  stair stretch  -CK      Reps 9  3  -CK      Time 9  30  -CK      Additional Comments  knee flexion/ext  -CK         Exercise 10    Exercise Name 10  SAQ with russian stim  -CK      Cueing 10  Verbal;Demo  -CK      Reps 10  20  -CK      Additional Comments  10 reps pushing with the stimulation; 10 reps pushing without the stimulation  -CK         Exercise 11    Exercise Name 11  prone knee hang  -CK      Additional Comments  add in time permitting next session  -CK         Exercise 12    Exercise Name 12  Recumbent bike  -CK      Time 12  4 min  -CK      Additional Comments  seat 8; rocking back/forth  -CK         Exercise 13    Exercise Name 13  LAQ with Russion Stim  -CK      Reps 13  20  -CK      Additional Comments  10 reps pushing with the stimulation; 10 reps pushing without the stimulation  -CK        User Key  (r) = Recorded By, (t) = Taken By, (c) = Cosigned By    Initials Name Provider Type    CK Rahul Valdovinos S, PT Physical Therapist                                            Time Calculation:   Start Time: 1000  Stop Time: 1057  Time Calculation (min): 57 min  Total Timed Code Minutes- PT: 45 minute(s)  Therapy Suggested Charges     Code   Minutes Charges    22147 (CPT®) Hc Pt Neuromusc Re Education Ea 15 Min 15 1    13129 (CPT®) Hc Pt Ther Proc Ea 15 Min 30 2    25107 (CPT®) Hc Gait Training Ea 15 Min      06791 (CPT®) Hc Pt Therapeutic Act Ea 15 Min      33345 (CPT®) Hc Pt Manual Therapy Ea 15 Min      77720 (CPT®) Hc Pt Ther Massage- Per 15 Min      87498 (CPT®) Hc Pt Iontophoresis Ea 15 Min      00227 (CPT®) Hc Pt Elec Stim Ea-Per 15 Min      95916 (CPT®) Hc Pt Ultrasound Ea 15 Min      62350 (CPT®) Hc Pt Self Care/Mgmt/Train Ea 15 Min      89663 (CPT®) Hc Pt Prosthetic (S) Train Initial Encounter, Each 15 Min      63591 (CPT®) Hc Orthotic(S) Mgmt/Train Initial Encounter, Each 15min      28647 (CPT®) Hc Pt Aquatic Therapy Ea 15  Min      46679 (CPT®) Hc Pt Orthotic(S)/Prosthetic(S) Encounter, Each 15 Min       (CPT®) Hc Pt Electrical Stim Unattended 12 1    Total  57 4        Therapy Charges for Today     Code Description Service Date Service Provider Modifiers Qty    60650091749 HC PT NEUROMUSC RE EDUCATION EA 15 MIN 12/7/2018 Rahul Valdovinos, PT GP 1    48354655218 HC PT THER PROC EA 15 MIN 12/7/2018 Rahul Valdovinos, PT GP 2    57232360426 HC PT ELECTRICAL STIM UNATTENDED 12/7/2018 Rahul Valdovinos, PT  1    66076156433 HC PT HOT OR COLD PACK TREAT MCARE 12/7/2018 Rahul Valdovinos, PT GP 1                    Rahul Valdovinos, PT  12/7/2018

## 2018-12-10 ENCOUNTER — HOSPITAL ENCOUNTER (OUTPATIENT)
Dept: PHYSICAL THERAPY | Facility: HOSPITAL | Age: 63
Setting detail: THERAPIES SERIES
Discharge: HOME | End: 2018-12-10

## 2018-12-10 DIAGNOSIS — G89.29 CHRONIC PAIN OF LEFT KNEE: ICD-10-CM

## 2018-12-10 DIAGNOSIS — Z47.89 ORTHOPEDIC AFTERCARE: Primary | ICD-10-CM

## 2018-12-10 DIAGNOSIS — Z78.9 IMPAIRED MOBILITY AND ACTIVITIES OF DAILY LIVING: ICD-10-CM

## 2018-12-10 DIAGNOSIS — M25.562 CHRONIC PAIN OF LEFT KNEE: ICD-10-CM

## 2018-12-10 DIAGNOSIS — Z74.09 IMPAIRED MOBILITY AND ACTIVITIES OF DAILY LIVING: ICD-10-CM

## 2018-12-10 DIAGNOSIS — Z96.652 STATUS POST LEFT KNEE REPLACEMENT: ICD-10-CM

## 2018-12-10 PROCEDURE — 97110 THERAPEUTIC EXERCISES: CPT

## 2018-12-10 PROCEDURE — G0283 ELEC STIM OTHER THAN WOUND: HCPCS

## 2018-12-10 RX ORDER — HYDROCODONE BITARTRATE AND ACETAMINOPHEN 7.5; 325 MG/1; MG/1
TABLET ORAL
Qty: 42 TABLET | Refills: 0 | Status: SHIPPED | OUTPATIENT
Start: 2018-12-10 | End: 2018-12-26

## 2018-12-10 RX ORDER — PSEUDOEPHEDRINE HCL 30 MG
100 TABLET ORAL 2 TIMES DAILY
Qty: 60 EACH | Refills: 0 | Status: SHIPPED | OUTPATIENT
Start: 2018-12-10 | End: 2020-07-06

## 2018-12-10 NOTE — THERAPY TREATMENT NOTE
Outpatient Physical Therapy Ortho Treatment Note  [facility]     Patient Name: Bob Biggs Sr.  : 1955  MRN: [id number]  Today's Date: 12/10/2018      Visit Date: 12/10/2018    Visit Dx:    ICD-10-CM ICD-9-CM   1. Orthopedic aftercare Z47.89 V54.9   2. Impaired mobility and activities of daily living Z74.09 799.89   3. Status post left knee replacement Z96.652 V43.65   4. Chronic pain of left knee M25.562 719.46    G89.29 338.29       Patient Active Problem List   Diagnosis   • Osteoarthritis of right knee   • Status post total right knee replacement   • Diabetes (CMS/HCC)   • Arthritis   • Arthritis   • GERD (gastroesophageal reflux disease)   • Chronic pain of left knee   • Osteoarthritis of left knee   • Decreased mobility        Past Medical History:   Diagnosis Date   • Arthritis    • Diabetes mellitus (CMS/HCC)    • Finger fracture, left 2018   • GERD (gastroesophageal reflux disease)    • Painful swelling of joint    • Tear of meniscus of knee         Past Surgical History:   Procedure Laterality Date   • COLONOSCOPY     • HYDROCELECTOMY      late    • KNEE ARTHROSCOPY Left ,    • KNEE SURGERY  2016    Right TKA                       PT Assessment/Plan     Row Name 12/10/18 1214          PT Assessment    Assessment Comments  Quad strength improving. Doesnt return to MD until   -       User Key  (r) = Recorded By, (t) = Taken By, (c) = Cosigned By    Initials Name Provider Type    WS Domingo Hill PTA Physical Therapy Assistant          Modalities     Row Name 12/10/18 1125             Ice    Location  left knee on 2 pillows  -      Rx Minutes  12 mins  -WS      Ice S/P Rx  Yes  -         ELECTRICAL STIMULATION    Attended/Unattended  Unattended  -      Stimulation Type  FES Russian  -      Location/Electrode Placement/Other  L quad, pt. seated QS into ball  -        User Key  (r) = Recorded By, (t) = Taken By, (c) = Cosigned By     Initials Name Provider Type    WS Domingo Hill PTA Physical Therapy Assistant          Exercises     Row Name 12/10/18 7371             Subjective Comments    Subjective Comments  Woke up with increased pain.   -WS         Subjective Pain    Able to rate subjective pain?  yes  -WS      Pre-Treatment Pain Level  7  -WS      Subjective Pain Comment  MD not til Dec 31  -WS         Total Minutes    70180 - PT Therapeutic Exercise Minutes  30  -WS         Exercise 1    Exercise Name 1  seated QS in ball with estim  -WS      Reps 1  --  -WS      Time 1  10 min  -WS         Exercise 2    Exercise Name 2  calf raise  -WS      Cueing 2  Verbal  -WS      Reps 2  20  -WS         Exercise 3    Exercise Name 3  wall slide  -WS      Cueing 3  Verbal;Demo  -WS      Reps 3  10  -WS         Exercise 4    Exercise Name 4  mini squat  -WS      Cueing 4  Verbal;Demo  -WS      Reps 4  15  -WS      Additional Comments  equal weight  -WS         Exercise 5    Exercise Name 5  standing TKE  -WS      Cueing 5  Verbal;Demo  -WS      Reps 5  15  -WS      Time 5  5s  -WS      Additional Comments  HB  -WS         Exercise 6    Exercise Name 6  Nustep, UE/LE  -WS      Cueing 6  Verbal  -WS      Time 6  5  -WS      Additional Comments  L5 seat 5  -WS         Exercise 7    Exercise Name 7  hip abd, B  -WS      Cueing 7  Verbal;Demo  -WS      Time 7  20  -WS      Additional Comments  2#  -WS         Exercise 8    Exercise Name 8  standing HS curl L   -WS      Cueing 8  Verbal;Demo  -WS      Reps 8  20  -WS      Additional Comments  2#  -WS         Exercise 9    Exercise Name 9  stair stretch  -WS      Reps 9  3  -WS      Time 9  30  -WS      Additional Comments  knee flex/ extension  -WS         Exercise 10    Exercise Name 10  SAQ with russian stim  -WS      Cueing 10  Verbal;Demo  -WS      Reps 10  20  -WS      Additional Comments  2#  -WS         Exercise 11    Exercise Name 11  prone knee hang  -WS      Cueing 11  Demo  -WS      Time 11   1 min  -         Exercise 12    Exercise Name 12  Recumbent bike  -      Time 12  4 min  -      Additional Comments  seat 8 rocking  -         Exercise 13    Exercise Name 13  LAQ with Russion Stim  -      Reps 13  20  -      Additional Comments  2#  -        User Key  (r) = Recorded By, (t) = Taken By, (c) = Cosigned By    Initials Name Provider Type     Domingo Hill PTA Physical Therapy Assistant                         PT OP Goals     Row Name 12/10/18 1200          PT Short Term Goals    STG 1  Patient will be independent and compliant with initial home exercise program  -     STG 1 Progress  Met  -     STG 2  Patient will be independent with patellar and scar tissue mobilization techniques.  -     STG 2 Progress  Ongoing  -     STG 3  Patient will be independent with education for symptom management, joint protection and strategies to minimize stress on affected tissues  -WS     STG 3 Progress  Partially Met  -     STG 4  Patient will demonstrate L knee active flexion to >/= 95 degrees  -     STG 4 Progress  Ongoing  -     STG 5  Patient will be able to bring L leg onto/off of bed and into/out of car without use of B hands.   -     STG 5 Progress  Met  -        Long Term Goals    LTG 1  Patient will be independent and compliant with advanced home exercise program to facilitate self-management of symptoms  -     LTG 2  Patient will ambulate with least restrictive assistive device with near normal gait on level ground short community distances  -     LTG 3  Patient will demonstrate improved L knee flexion to at least 110 degrees and full knee extension for improved AROM and ease with ADLs.  -     LTG 4  Patient will negotiate stairs reciprocally with rail support as needed.  -     LTG 5  Patient will demonstrate 4+/5 L knee strength (flexion/extension) for improved function and ease with return to deficit free ambulation and stair negotiation.  -       User Key   (r) = Recorded By, (t) = Taken By, (c) = Cosigned By    Initials Name Provider Type    Domingo Sanchez PTA Physical Therapy Assistant          Therapy Education  Given: HEP, Symptoms/condition management, Pain management, Edema management  Program: Reinforced  How Provided: Verbal  Provided to: Patient              Time Calculation:   Start Time: 1125  Stop Time: 1215  Time Calculation (min): 50 min  Therapy Suggested Charges     Code   Minutes Charges    46009 (CPT®) Hc Pt Neuromusc Re Education Ea 15 Min      84636 (CPT®) Hc Pt Ther Proc Ea 15 Min 30 2    99419 (CPT®) Hc Gait Training Ea 15 Min      92745 (CPT®) Hc Pt Therapeutic Act Ea 15 Min      40882 (CPT®) Hc Pt Manual Therapy Ea 15 Min      51303 (CPT®) Hc Pt Ther Massage- Per 15 Min      48657 (CPT®) Hc Pt Iontophoresis Ea 15 Min      32652 (CPT®) Hc Pt Elec Stim Ea-Per 15 Min      02378 (CPT®) Hc Pt Ultrasound Ea 15 Min      34364 (CPT®) Hc Pt Self Care/Mgmt/Train Ea 15 Min      00867 (CPT®) Hc Pt Prosthetic (S) Train Initial Encounter, Each 15 Min      02813 (CPT®) Hc Orthotic(S) Mgmt/Train Initial Encounter, Each 15min      56397 (CPT®) Hc Pt Aquatic Therapy Ea 15 Min      09072 (CPT®) Hc Pt Orthotic(S)/Prosthetic(S) Encounter, Each 15 Min       (CPT®) Hc Pt Electrical Stim Unattended      Total  30 2        Therapy Charges for Today     Code Description Service Date Service Provider Modifiers Qty    87558724607 HC PT THER PROC EA 15 MIN 12/10/2018 Domingo Hill PTA GP 2    50264405017 HC PT ELECTRICAL STIM UNATTENDED 12/10/2018 Domingo Hill PTA  1    78986765295 HC PT HOT OR COLD PACK TREAT MCARE 12/10/2018 Domingo Hill PTA GP 1                    Domingo Hill PTA  12/10/2018

## 2018-12-11 ENCOUNTER — TELEPHONE (OUTPATIENT)
Dept: ORTHOPEDIC SURGERY | Facility: CLINIC | Age: 63
End: 2018-12-11

## 2018-12-11 RX ORDER — OXYCODONE AND ACETAMINOPHEN 7.5; 325 MG/1; MG/1
1 TABLET ORAL EVERY 4 HOURS PRN
Qty: 60 TABLET | Refills: 0 | Status: SHIPPED | OUTPATIENT
Start: 2018-12-11 | End: 2018-12-26 | Stop reason: SDUPTHER

## 2018-12-11 NOTE — TELEPHONE ENCOUNTER
Pt called regarding his refill from yesterday.  He stated that he is still in a lot a pain and does not feel like he is ready to decrease to Hydrocodone.  He also stated that it makes his mouth dry and he cannot sleep when taking it.   He is requesting another refill on Oxycodone instead.  Please advise.

## 2018-12-11 NOTE — TELEPHONE ENCOUNTER
Done.  Please let him know that the oxycodone has been called in.  Let him know that the hydrocodone was mistakenly called in initially.  This was an error on our part.

## 2018-12-12 ENCOUNTER — HOSPITAL ENCOUNTER (OUTPATIENT)
Dept: PHYSICAL THERAPY | Facility: HOSPITAL | Age: 63
Setting detail: THERAPIES SERIES
Discharge: HOME | End: 2018-12-12

## 2018-12-12 DIAGNOSIS — Z96.652 STATUS POST LEFT KNEE REPLACEMENT: ICD-10-CM

## 2018-12-12 DIAGNOSIS — Z74.09 IMPAIRED MOBILITY AND ACTIVITIES OF DAILY LIVING: ICD-10-CM

## 2018-12-12 DIAGNOSIS — G89.29 CHRONIC PAIN OF LEFT KNEE: ICD-10-CM

## 2018-12-12 DIAGNOSIS — Z47.89 ORTHOPEDIC AFTERCARE: Primary | ICD-10-CM

## 2018-12-12 DIAGNOSIS — Z78.9 IMPAIRED MOBILITY AND ACTIVITIES OF DAILY LIVING: ICD-10-CM

## 2018-12-12 DIAGNOSIS — M25.562 CHRONIC PAIN OF LEFT KNEE: ICD-10-CM

## 2018-12-12 PROCEDURE — 97110 THERAPEUTIC EXERCISES: CPT

## 2018-12-12 PROCEDURE — G0283 ELEC STIM OTHER THAN WOUND: HCPCS

## 2018-12-12 NOTE — THERAPY TREATMENT NOTE
Outpatient Physical Therapy Ortho Treatment Note  [facility]     Patient Name: Bob Biggs Sr.  : 1955  MRN: [id number]  Today's Date: 2018      Visit Date: 2018    Visit Dx:    ICD-10-CM ICD-9-CM   1. Orthopedic aftercare Z47.89 V54.9   2. Impaired mobility and activities of daily living Z74.09 799.89   3. Status post left knee replacement Z96.652 V43.65   4. Chronic pain of left knee M25.562 719.46    G89.29 338.29       Patient Active Problem List   Diagnosis   • Osteoarthritis of right knee   • Status post total right knee replacement   • Diabetes (CMS/HCC)   • Arthritis   • Arthritis   • GERD (gastroesophageal reflux disease)   • Chronic pain of left knee   • Osteoarthritis of left knee   • Decreased mobility        Past Medical History:   Diagnosis Date   • Arthritis    • Diabetes mellitus (CMS/HCC)    • Finger fracture, left 2018   • GERD (gastroesophageal reflux disease)    • Painful swelling of joint    • Tear of meniscus of knee         Past Surgical History:   Procedure Laterality Date   • COLONOSCOPY     • HYDROCELECTOMY      late    • KNEE ARTHROSCOPY Left ,    • KNEE SURGERY  2016    Right TKA                       PT Assessment/Plan     Row Name 18 1209          PT Assessment    Assessment Comments  Pain decreasing. Passive left knee flex to 70 with over pressure.  -WS       User Key  (r) = Recorded By, (t) = Taken By, (c) = Cosigned By    Initials Name Provider Type    WS Domingo Hill PTA Physical Therapy Assistant          Modalities     Row Name 18 1129             Ice    Ice Applied  Yes  -WS      Location  left knee on 2 pillows  -WS      Rx Minutes  10 mins  -WS      Ice S/P Rx  Yes  -WS         ELECTRICAL STIMULATION    Attended/Unattended  Unattended  -      Stimulation Type  FES Russian  -      Location/Electrode Placement/Other  L quad, pt. seated QS into ball  -WS        User Key  (r) = Recorded By, (t) =  Taken By, (c) = Cosigned By    Initials Name Provider Type    WS Domingo Hill PTA Physical Therapy Assistant          Exercises     Row Name 12/12/18 1129             Subjective Comments    Subjective Comments  Decreased knee pain  -WS         Subjective Pain    Able to rate subjective pain?  yes  -WS      Pre-Treatment Pain Level  3  -WS         Total Minutes    16243 - PT Therapeutic Exercise Minutes  30  -WS         Exercise 1    Exercise Name 1  seated QS in ball with estim  -WS      Time 1  10 min  -WS         Exercise 2    Exercise Name 2  calf raise  -WS      Cueing 2  Verbal  -WS      Reps 2  20  -WS         Exercise 3    Exercise Name 3  wall slide  -WS      Cueing 3  Verbal;Demo  -WS      Reps 3  10  -WS         Exercise 4    Exercise Name 4  mini squat  -WS      Cueing 4  Verbal;Demo  -WS      Reps 4  15  -WS         Exercise 5    Exercise Name 5  standing TKE  -WS      Cueing 5  Verbal;Demo  -WS      Reps 5  15  -WS      Time 5  5s  -WS      Additional Comments  HB  -WS         Exercise 6    Exercise Name 6  Nustep, UE/LE  -WS      Cueing 6  Verbal  -WS      Time 6  5  -WS      Additional Comments  L5 seat 5  -WS         Exercise 7    Exercise Name 7  hip abd, B  -WS      Cueing 7  Verbal;Demo  -WS      Time 7  20  -WS      Additional Comments  3#  -WS         Exercise 8    Exercise Name 8  standing HS curl L   -WS      Cueing 8  Verbal;Demo  -WS      Reps 8  20  -WS      Additional Comments  2#  -WS         Exercise 9    Exercise Name 9  stair stretch  -WS      Reps 9  3  -WS      Time 9  30  -WS      Additional Comments  knee flex/ext  -WS         Exercise 10    Exercise Name 10  SAQ   -WS      Cueing 10  Verbal;Demo  -WS      Reps 10  20  -WS      Additional Comments  2#  -WS         Exercise 11    Exercise Name 11  prone knee hang  -WS      Cueing 11  Demo  -WS      Time 11  1 min  -WS         Exercise 12    Exercise Name 12  Recumbent bike  -WS      Time 12  4 min  -WS      Additional Comments   seat 8 rocking  -         Exercise 13    Exercise Name 13  LAQ   -      Reps 13  20  -      Additional Comments  3#  -        User Key  (r) = Recorded By, (t) = Taken By, (c) = Cosigned By    Initials Name Provider Type    Domingo Sanchez PTA Physical Therapy Assistant                         PT OP Goals     Row Name 12/12/18 1200          PT Short Term Goals    STG 1  Patient will be independent and compliant with initial home exercise program  -     STG 1 Progress  Met  -     STG 2  Patient will be independent with patellar and scar tissue mobilization techniques.  -     STG 2 Progress  Ongoing  -     STG 3  Patient will be independent with education for symptom management, joint protection and strategies to minimize stress on affected tissues  -WS     STG 3 Progress  Partially Met  -     STG 4  Patient will demonstrate L knee active flexion to >/= 95 degrees  -     STG 4 Progress  Ongoing  -     STG 4 Progress Comments  passive flex 70  -     STG 5  Patient will be able to bring L leg onto/off of bed and into/out of car without use of B hands.   -     STG 5 Progress  Met  -        Long Term Goals    LTG 1  Patient will be independent and compliant with advanced home exercise program to facilitate self-management of symptoms  -     LTG 2  Patient will ambulate with least restrictive assistive device with near normal gait on level ground short community distances  -     LTG 3  Patient will demonstrate improved L knee flexion to at least 110 degrees and full knee extension for improved AROM and ease with ADLs.  -     LTG 4  Patient will negotiate stairs reciprocally with rail support as needed.  -     LTG 5  Patient will demonstrate 4+/5 L knee strength (flexion/extension) for improved function and ease with return to deficit free ambulation and stair negotiation.  -       User Key  (r) = Recorded By, (t) = Taken By, (c) = Cosigned By    Initials Name Provider Type      Domingo Hill PTA Physical Therapy Assistant          Therapy Education  Given: HEP, Symptoms/condition management, Posture/body mechanics, Pain management  Program: Reinforced  How Provided: Verbal  Provided to: Patient              Time Calculation:   Start Time: 1129  Stop Time: 1220  Time Calculation (min): 51 min  Therapy Suggested Charges     Code   Minutes Charges    32219 (CPT®) Hc Pt Neuromusc Re Education Ea 15 Min      37559 (CPT®) Hc Pt Ther Proc Ea 15 Min 30 2    36430 (CPT®) Hc Gait Training Ea 15 Min      68471 (CPT®) Hc Pt Therapeutic Act Ea 15 Min      41182 (CPT®) Hc Pt Manual Therapy Ea 15 Min      44042 (CPT®) Hc Pt Ther Massage- Per 15 Min      15186 (CPT®) Hc Pt Iontophoresis Ea 15 Min      92128 (CPT®) Hc Pt Elec Stim Ea-Per 15 Min      19230 (CPT®) Hc Pt Ultrasound Ea 15 Min      02072 (CPT®) Hc Pt Self Care/Mgmt/Train Ea 15 Min      55796 (CPT®) Hc Pt Prosthetic (S) Train Initial Encounter, Each 15 Min      32702 (CPT®) Hc Orthotic(S) Mgmt/Train Initial Encounter, Each 15min      52059 (CPT®) Hc Pt Aquatic Therapy Ea 15 Min      99505 (CPT®) Hc Pt Orthotic(S)/Prosthetic(S) Encounter, Each 15 Min       (CPT®) Hc Pt Electrical Stim Unattended      Total  30 2        Therapy Charges for Today     Code Description Service Date Service Provider Modifiers Qty    99428058514 HC PT THER PROC EA 15 MIN 12/12/2018 Domingo Hill PTA GP 2    96237220950 HC PT ELECTRICAL STIM UNATTENDED 12/12/2018 Domingo Hill PTA  1    34987114343 HC PT HOT OR COLD PACK TREAT MCARE 12/12/2018 Domingo Hill PTA GP 1                    Domingo Hill PTA  12/12/2018

## 2018-12-14 ENCOUNTER — HOSPITAL ENCOUNTER (OUTPATIENT)
Dept: PHYSICAL THERAPY | Facility: HOSPITAL | Age: 63
Setting detail: THERAPIES SERIES
Discharge: HOME | End: 2018-12-14

## 2018-12-14 DIAGNOSIS — Z47.89 ORTHOPEDIC AFTERCARE: Primary | ICD-10-CM

## 2018-12-14 DIAGNOSIS — G89.29 CHRONIC PAIN OF LEFT KNEE: ICD-10-CM

## 2018-12-14 DIAGNOSIS — M25.562 CHRONIC PAIN OF LEFT KNEE: ICD-10-CM

## 2018-12-14 DIAGNOSIS — Z96.652 STATUS POST LEFT KNEE REPLACEMENT: ICD-10-CM

## 2018-12-14 DIAGNOSIS — Z78.9 IMPAIRED MOBILITY AND ACTIVITIES OF DAILY LIVING: ICD-10-CM

## 2018-12-14 DIAGNOSIS — Z74.09 IMPAIRED MOBILITY AND ACTIVITIES OF DAILY LIVING: ICD-10-CM

## 2018-12-14 PROCEDURE — 97110 THERAPEUTIC EXERCISES: CPT | Performed by: PHYSICAL THERAPIST

## 2018-12-14 PROCEDURE — G0283 ELEC STIM OTHER THAN WOUND: HCPCS | Performed by: PHYSICAL THERAPIST

## 2018-12-14 PROCEDURE — 97112 NEUROMUSCULAR REEDUCATION: CPT | Performed by: PHYSICAL THERAPIST

## 2018-12-14 NOTE — THERAPY TREATMENT NOTE
Outpatient Physical Therapy Ortho Treatment Note  [facility]     Patient Name: Bob Biggs Sr.  : 1955  MRN: [id number]  Today's Date: 2018      Visit Date: 2018    Visit Dx:    ICD-10-CM ICD-9-CM   1. Orthopedic aftercare Z47.89 V54.9   2. Impaired mobility and activities of daily living Z74.09 799.89   3. Status post left knee replacement Z96.652 V43.65   4. Chronic pain of left knee M25.562 719.46    G89.29 338.29       Patient Active Problem List   Diagnosis   • Osteoarthritis of right knee   • Status post total right knee replacement   • Diabetes (CMS/HCC)   • Arthritis   • Arthritis   • GERD (gastroesophageal reflux disease)   • Chronic pain of left knee   • Osteoarthritis of left knee   • Decreased mobility        Past Medical History:   Diagnosis Date   • Arthritis    • Diabetes mellitus (CMS/HCC)    • Finger fracture, left 2018   • GERD (gastroesophageal reflux disease)    • Painful swelling of joint    • Tear of meniscus of knee         Past Surgical History:   Procedure Laterality Date   • COLONOSCOPY     • HYDROCELECTOMY      late    • KNEE ARTHROSCOPY Left ,    • KNEE SURGERY  2016    Right TKA                       PT Assessment/Plan     Row Name 18 1136          PT Assessment    Assessment Comments  Mongolian stimulation used to quads and hamstrings depending on exercise to promote muscle activation. Flexion ROM to 71 degrees today. Warmth remains in L knee. Still unable to make full revolution on bike with seat back at position 8. Flexion ROM remains a concern at this point despite gains from initial evaluation. Patient is working on his exercises per his report as instructed.   -CK       User Key  (r) = Recorded By, (t) = Taken By, (c) = Cosigned By    Initials Name Provider Type    CK aRhul Valdovinos, PT Physical Therapist          Modalities     Row Name 18 1100             Ice    Ice Applied  Yes  -CK      Location  left knee on  3 pillows  -CK      Rx Minutes  12 mins  -CK      Ice S/P Rx  Yes  -CK         ELECTRICAL STIMULATION    Attended/Unattended  Unattended  -CK      Stimulation Type  FES Russian  -CK      Max mAmp  36  -CK      Location/Electrode Placement/Other  L quad and then L hamstring depending on exercise  -CK       PT E-Stim Unattended (Manual) Minutes  15  -CK        User Key  (r) = Recorded By, (t) = Taken By, (c) = Cosigned By    Initials Name Provider Type    CK Rahul Valdovinos S, PT Physical Therapist          Exercises     Row Name 12/14/18 1000             Subjective Comments    Subjective Comments  Knee pain was better. Still cannot bend it how I want  -CK         Subjective Pain    Able to rate subjective pain?  yes  -CK      Pre-Treatment Pain Level  2  -CK         Total Minutes    02979 - PT Therapeutic Exercise Minutes  30  -CK      19110 -  PT Neuromuscular Reeducation Minutes  15  -CK         Exercise 1    Exercise Name 1  seated QS in ball with estim  -CK      Reps 1  2  -CK      Time 1  10  -CK      Additional Comments  10 reps Montserratian on/10 reps Montserratian off  -CK         Exercise 2    Exercise Name 2  calf raise  -CK      Cueing 2  Verbal  -CK      Reps 2  20  -CK         Exercise 3    Exercise Name 3  wall slide  -CK      Cueing 3  Verbal;Demo  -CK      Reps 3  10  -CK         Exercise 4    Exercise Name 4  mini squat  -CK      Cueing 4  Verbal;Demo  -CK      Reps 4  15  -CK      Additional Comments  equal weight  -CK         Exercise 5    Exercise Name 5  supine heel slides  -CK      Reps 5  10  -CK      Time 5  5 sec hold at efrem  -CK         Exercise 6    Exercise Name 6  Nustep, UE/LE  -CK      Cueing 6  Verbal  -CK      Time 6  5  -CK         Exercise 7    Exercise Name 7  hip abd, B  -CK      Cueing 7  Verbal;Demo  -CK      Time 7  20  -CK      Additional Comments  #3  -CK         Exercise 8    Exercise Name 8  standing HS curl L   -CK      Cueing 8  Verbal;Demo  -CK      Reps 8  30  -CK       Additional Comments  #3  -CK         Exercise 9    Exercise Name 9  stair stretch: HS and Quad  -CK      Reps 9  3  -CK      Time 9  30  -CK      Additional Comments  knee flex/ext  -CK         Exercise 10    Exercise Name 10  SAQ   -CK      Cueing 10  Verbal;Demo  -CK      Sets 10  2  -CK      Reps 10  10  -CK      Additional Comments  #3, 10 reps Maltese on/2 reps Maltese off  -CK         Exercise 11    Exercise Name 11  prone knee hang  -CK      Cueing 11  Demo  -CK      Time 11  1 min  -CK         Exercise 12    Exercise Name 12  Recumbent bike  -CK      Time 12  4 min  -CK      Additional Comments  seat 8, rocking-point toe down  -CK         Exercise 13    Exercise Name 13  LAQ   -CK      Sets 13  2  -CK      Reps 13  10  -CK      Additional Comments  #3, 10 reps with Maltese on/10 reps with Maltese off  -CK        User Key  (r) = Recorded By, (t) = Taken By, (c) = Cosigned By    Initials Name Provider Type    CK Rahul Valdovinos S, PT Physical Therapist                         PT OP Goals     Row Name 12/14/18 1100          PT Short Term Goals    STG 1  Patient will be independent and compliant with initial home exercise program  -CK     STG 1 Progress  Met  -CK     STG 2  Patient will be independent with patellar and scar tissue mobilization techniques.  -CK     STG 2 Progress  Ongoing  -CK     STG 3  Patient will be independent with education for symptom management, joint protection and strategies to minimize stress on affected tissues  -CK     STG 3 Progress  Met  -CK     STG 4  Patient will demonstrate L knee active flexion to >/= 95 degrees  -CK     STG 4 Progress  Ongoing  -CK     STG 4 Progress Comments  71 today, supine  -CK     STG 5  Patient will be able to bring L leg onto/off of bed and into/out of car without use of B hands.   -CK     STG 5 Progress  Met  -CK        Long Term Goals    LTG 1  Patient will be independent and compliant with advanced home exercise program to facilitate self-management of  symptoms  -CK     LTG 1 Progress  Progressing  -CK     LTG 1 Progress Comments  progresssing as tolerated  -CK     LTG 2  Patient will ambulate with least restrictive assistive device with near normal gait on level ground short community distances  -CK     LTG 2 Progress  Ongoing  -CK     LTG 2 Progress Comments  antalgic gait pattern with SC  -CK     LTG 3  Patient will demonstrate improved L knee flexion to at least 110 degrees and full knee extension for improved AROM and ease with ADLs.  -CK     LTG 3 Progress  Ongoing  -CK     LTG 3 Progress Comments  71 today  -CK     LTG 4  Patient will negotiate stairs reciprocally with rail support as needed.  -CK     LTG 4 Progress  Ongoing  -CK     LTG 4 Progress Comments  not performed to date  -CK     LTG 5  Patient will demonstrate 4+/5 L knee strength (flexion/extension) for improved function and ease with return to deficit free ambulation and stair negotiation.  -CK     LTG 5 Progress  Progressing  -CK     LTG 5 Progress Comments  strengthening within available range  -CK       User Key  (r) = Recorded By, (t) = Taken By, (c) = Cosigned By    Initials Name Provider Type    CK Rahul Valdovinos S, PT Physical Therapist                         Time Calculation:   Start Time: 1000  Stop Time: 1057  Time Calculation (min): 57 min  Total Timed Code Minutes- PT: 45 minute(s)  Therapy Suggested Charges     Code   Minutes Charges    15605 (CPT®) Hc Pt Neuromusc Re Education Ea 15 Min 15 1    96036 (CPT®) Hc Pt Ther Proc Ea 15 Min 30 2    43575 (CPT®) Hc Gait Training Ea 15 Min      14068 (CPT®) Hc Pt Therapeutic Act Ea 15 Min      84250 (CPT®) Hc Pt Manual Therapy Ea 15 Min      51540 (CPT®) Hc Pt Ther Massage- Per 15 Min      67143 (CPT®) Hc Pt Iontophoresis Ea 15 Min      56408 (CPT®) Hc Pt Elec Stim Ea-Per 15 Min      55733 (CPT®) Hc Pt Ultrasound Ea 15 Min      72560 (CPT®) Hc Pt Self Care/Mgmt/Train Ea 15 Min      31810 (CPT®) Hc Pt Prosthetic (S) Train Initial Encounter,  Each 15 Min      60936 (CPT®) Hc Orthotic(S) Mgmt/Train Initial Encounter, Each 15min      88303 (CPT®) Hc Pt Aquatic Therapy Ea 15 Min      91822 (CPT®) Hc Pt Orthotic(S)/Prosthetic(S) Encounter, Each 15 Min       (CPT®) Hc Pt Electrical Stim Unattended 15 1    Total  60 4        Therapy Charges for Today     Code Description Service Date Service Provider Modifiers Qty    75203410140 HC PT NEUROMUSC RE EDUCATION EA 15 MIN 12/14/2018 Rahul Valdovinos, PT GP 1    77435855011 HC PT THER PROC EA 15 MIN 12/14/2018 Rahul Valdovinos, PT GP 2    39654610940 HC PT ELECTRICAL STIM UNATTENDED 12/14/2018 Rahul Valdovinos, PT  1    60021945546 HC PT HOT OR COLD PACK TREAT MCARE 12/14/2018 Rahul Valdovinos, PT GP 1                    Rahul Valdovinos, PT  12/14/2018

## 2018-12-17 ENCOUNTER — HOSPITAL ENCOUNTER (OUTPATIENT)
Dept: PHYSICAL THERAPY | Facility: HOSPITAL | Age: 63
Setting detail: THERAPIES SERIES
Discharge: HOME | End: 2018-12-17

## 2018-12-17 DIAGNOSIS — Z47.89 ORTHOPEDIC AFTERCARE: Primary | ICD-10-CM

## 2018-12-17 DIAGNOSIS — G89.29 CHRONIC PAIN OF LEFT KNEE: ICD-10-CM

## 2018-12-17 DIAGNOSIS — Z96.652 STATUS POST LEFT KNEE REPLACEMENT: ICD-10-CM

## 2018-12-17 DIAGNOSIS — M25.562 CHRONIC PAIN OF LEFT KNEE: ICD-10-CM

## 2018-12-17 DIAGNOSIS — Z74.09 IMPAIRED MOBILITY AND ACTIVITIES OF DAILY LIVING: ICD-10-CM

## 2018-12-17 DIAGNOSIS — Z78.9 IMPAIRED MOBILITY AND ACTIVITIES OF DAILY LIVING: ICD-10-CM

## 2018-12-17 PROCEDURE — 97110 THERAPEUTIC EXERCISES: CPT | Performed by: PHYSICAL THERAPIST

## 2018-12-17 PROCEDURE — 97140 MANUAL THERAPY 1/> REGIONS: CPT | Performed by: PHYSICAL THERAPIST

## 2018-12-17 NOTE — THERAPY TREATMENT NOTE
Outpatient Physical Therapy Ortho Treatment Note  [facility]     Patient Name: Bob Biggs Sr.  : 1955  MRN: [id number]  Today's Date: 2018      Visit Date: 2018    Visit Dx:    ICD-10-CM ICD-9-CM   1. Orthopedic aftercare Z47.89 V54.9   2. Impaired mobility and activities of daily living Z74.09 799.89   3. Status post left knee replacement Z96.652 V43.65   4. Chronic pain of left knee M25.562 719.46    G89.29 338.29       Patient Active Problem List   Diagnosis   • Osteoarthritis of right knee   • Status post total right knee replacement   • Diabetes (CMS/HCC)   • Arthritis   • Arthritis   • GERD (gastroesophageal reflux disease)   • Chronic pain of left knee   • Osteoarthritis of left knee   • Decreased mobility        Past Medical History:   Diagnosis Date   • Arthritis    • Diabetes mellitus (CMS/HCC)    • Finger fracture, left 2018   • GERD (gastroesophageal reflux disease)    • Painful swelling of joint    • Tear of meniscus of knee         Past Surgical History:   Procedure Laterality Date   • COLONOSCOPY     • HYDROCELECTOMY      late    • KNEE ARTHROSCOPY Left ,    • KNEE SURGERY  2016    Right TKA       PT Ortho     Row Name 18 1000       Left Lower Ext    Lt Knee Extension/Flexion AROM  75 flexion  -GJ    Lt Knee Extension/Flexion PROM  85 flexion  -GJ      User Key  (r) = Recorded By, (t) = Taken By, (c) = Cosigned By    Initials Name Provider Type    Felix Roy, PT Physical Therapist                      PT Assessment/Plan     Row Name 18 6428          PT Assessment    Assessment Comments  Mr. Biggs returns today ambulating with SC with improving gait pattern.  Today, we held on several exercises in lieu of working on ROM.  He was nearly able to perform full rotation backward on bike with assistance from therapist.  We worked on flexion ROM with static stretch of L quad followed by STM to L quad.  He did demonstrate  improved PROM L knee flexion post treatement.  Mr. Biggs continues to demonstrate quite limited L knee flexion ROM.  He is progressing toward all functional goals.   -GJ        PT Plan    PT Plan Comments  assess response to static stretch of L knee flexion, may consider to repeat.  Continue to work on L knee ROM, strength, gait normalization  -GJ       User Key  (r) = Recorded By, (t) = Taken By, (c) = Cosigned By    Initials Name Provider Type    Felix Roy, PT Physical Therapist          Modalities     Row Name 12/17/18 1000             Moist Heat    MH Applied  Yes  -GJ      Location  L quad,   -GJ      Rx Minutes  -- during stretching  -GJ         Ice    Ice Applied  Yes  -GJ      Location  L knee, rested on bolster  -GJ      Rx Minutes  10 mins  -GJ      Ice S/P Rx  Yes  -GJ        User Key  (r) = Recorded By, (t) = Taken By, (c) = Cosigned By    Initials Name Provider Type    Felix Roy, PT Physical Therapist          Exercises     Row Name 12/17/18 1042 12/17/18 1000          Subjective Comments    Subjective Comments  --  weekends are hard, always try to do too much  -GJ        Total Minutes    54552 - PT Therapeutic Exercise Minutes  24  -GJ  --     58890 - PT Manual Therapy Minutes  15  -GJ  --        Exercise 2    Exercise Name 2  --  calf raise  -GJ     Cueing 2  --  Verbal  -GJ     Reps 2  --  20  -GJ     Additional Comments  --  blue foam  -GJ        Exercise 4    Exercise Name 4  --  mini squat  -GJ     Cueing 4  --  Verbal;Demo  -GJ     Reps 4  --  15  -GJ     Additional Comments  --  equal weight  -GJ        Exercise 6    Exercise Name 6  --  Nustep, UE/LE  -GJ     Cueing 6  --  Verbal  -GJ     Time 6  --  5  -GJ        Exercise 7    Exercise Name 7  --  hip abd, B  -GJ     Cueing 7  --  Verbal;Demo  -GJ     Time 7  --  20  -GJ     Additional Comments  --  3#  -GJ        Exercise 12    Exercise Name 12  --  Recumbent bike  -GJ     Time 12  --  5 min  -GJ     Additional Comments  --   seat 8, therapist assisting with Range/rocking  -GJ        Exercise 13    Exercise Name 13  --  LAQ   -GJ     Cueing 13  --  Verbal;Demo  -GJ     Sets 13  --  2  -GJ     Reps 13  --  10  -GJ     Additional Comments  --  4#,  -GJ        Exercise 14    Exercise Name 14  --  leg press, LLE only   -GJ     Cueing 14  --  Verbal;Demo  -GJ     Reps 14  --  20  -GJ     Additional Comments  --  60#  -GJ        Exercise 15    Exercise Name 15  --  seated, knee flexion stretch, (seated EOB, GTB around L ankle) MH on L quad  -GJ     Cueing 15  --  Verbal  -GJ     Sets 15  --  3  -GJ     Reps 15  --  2 min  -GJ       User Key  (r) = Recorded By, (t) = Taken By, (c) = Cosigned By    Initials Name Provider Type    Felix Roy, PT Physical Therapist                        Manual Rx (last 36 hours)      Manual Treatments     Row Name 12/17/18 1100             Manual Rx 1    Manual Rx 1 Location  L quad/HS tissues,  -GJ      Manual Rx 1 Type  STM  -GJ      Manual Rx 1 Grade  moderate  -GJ      Manual Rx 1 Duration  12  -GJ        User Key  (r) = Recorded By, (t) = Taken By, (c) = Cosigned By    Initials Name Provider Type    Felix Roy, PT Physical Therapist          PT OP Goals     Row Name 12/17/18 1000          PT Short Term Goals    STG 1  Patient will be independent and compliant with initial home exercise program  -GJ     STG 1 Progress  Met  -GJ     STG 2  Patient will be independent with patellar and scar tissue mobilization techniques.  -GJ     STG 2 Progress  Met  -GJ     STG 3  Patient will be independent with education for symptom management, joint protection and strategies to minimize stress on affected tissues  -GJ     STG 3 Progress  Met  -GJ     STG 4  Patient will demonstrate L knee active flexion to >/= 95 degrees  -GJ     STG 4 Progress  Ongoing  -GJ     STG 4 Progress Comments  see ortho  -GJ     STG 5  Patient will be able to bring L leg onto/off of bed and into/out of car without use of B hands.    -GJ     STG 5 Progress  Met  -GJ        Long Term Goals    LTG 1  Patient will be independent and compliant with advanced home exercise program to facilitate self-management of symptoms  -GJ     LTG 1 Progress  Progressing  -GJ     LTG 2  Patient will ambulate with least restrictive assistive device with near normal gait on level ground short community distances  -GJ     LTG 2 Progress  Ongoing  -GJ     LTG 2 Progress Comments  ambulating with SC today  -GJ     LTG 3  Patient will demonstrate improved L knee flexion to at least 110 degrees and full knee extension for improved AROM and ease with ADLs.  -GJ     LTG 3 Progress  Ongoing  -GJ     LTG 3 Progress Comments  see ortho  -GJ     LTG 4  Patient will negotiate stairs reciprocally with rail support as needed.  -GJ     LTG 4 Progress  Ongoing  -GJ     LTG 5  Patient will demonstrate 4+/5 L knee strength (flexion/extension) for improved function and ease with return to deficit free ambulation and stair negotiation.  -GJ     LTG 5 Progress  Progressing  -GJ       User Key  (r) = Recorded By, (t) = Taken By, (c) = Cosigned By    Initials Name Provider Type    Felix Roy, PT Physical Therapist          Therapy Education  Education Details: discussed continued icing of L knee  Given: HEP, Symptoms/condition management, Pain management, Mobility training, Posture/body mechanics, Edema management  Program: New  How Provided: Verbal, Demonstration  Provided to: Patient  Level of Understanding: Verbalized, Demonstrated, Teach back education performed              Time Calculation:   Start Time: 1043  Stop Time: 1145  Time Calculation (min): 62 min  Therapy Suggested Charges     Code   Minutes Charges    28587 (CPT®)  Pt Neuromusc Re Education Ea 15 Min      05571 (CPT®) Hc Pt Ther Proc Ea 15 Min 24 2    16307 (CPT®) Hc Gait Training Ea 15 Min      44902 (CPT®) Hc Pt Therapeutic Act Ea 15 Min      52350 (CPT®) Hc Pt Manual Therapy Ea 15 Min 15 1    72532 (CPT®)  Hc Pt Ther Massage- Per 15 Min      26268 (CPT®) Hc Pt Iontophoresis Ea 15 Min      12836 (CPT®) Hc Pt Elec Stim Ea-Per 15 Min      43962 (CPT®) Hc Pt Ultrasound Ea 15 Min      15983 (CPT®) Hc Pt Self Care/Mgmt/Train Ea 15 Min      96520 (CPT®) Hc Pt Prosthetic (S) Train Initial Encounter, Each 15 Min      99688 (CPT®) Hc Orthotic(S) Mgmt/Train Initial Encounter, Each 15min      84204 (CPT®) Hc Pt Aquatic Therapy Ea 15 Min      08013 (CPT®) Hc Pt Orthotic(S)/Prosthetic(S) Encounter, Each 15 Min       (CPT®) Hc Pt Electrical Stim Unattended      Total  39 3        Therapy Charges for Today     Code Description Service Date Service Provider Modifiers Qty    41116185358 HC PT THER PROC EA 15 MIN 12/17/2018 Felix Villa, PT GP 2    30125999180 HC PT MANUAL THERAPY EA 15 MIN 12/17/2018 Felix Villa, PT GP 1    78758902269 HC PT HOT OR COLD PACK TREAT MCARE 12/17/2018 Felix Vlila, PT GP 1                    Felix Villa, PT  12/17/2018

## 2018-12-19 ENCOUNTER — HOSPITAL ENCOUNTER (OUTPATIENT)
Dept: PHYSICAL THERAPY | Facility: HOSPITAL | Age: 63
Setting detail: THERAPIES SERIES
Discharge: HOME | End: 2018-12-19

## 2018-12-19 DIAGNOSIS — M25.562 CHRONIC PAIN OF LEFT KNEE: ICD-10-CM

## 2018-12-19 DIAGNOSIS — Z78.9 IMPAIRED MOBILITY AND ACTIVITIES OF DAILY LIVING: ICD-10-CM

## 2018-12-19 DIAGNOSIS — Z96.652 STATUS POST LEFT KNEE REPLACEMENT: ICD-10-CM

## 2018-12-19 DIAGNOSIS — G89.29 CHRONIC PAIN OF LEFT KNEE: ICD-10-CM

## 2018-12-19 DIAGNOSIS — Z47.89 ORTHOPEDIC AFTERCARE: Primary | ICD-10-CM

## 2018-12-19 DIAGNOSIS — Z74.09 IMPAIRED MOBILITY AND ACTIVITIES OF DAILY LIVING: ICD-10-CM

## 2018-12-19 PROCEDURE — 97110 THERAPEUTIC EXERCISES: CPT

## 2018-12-19 NOTE — THERAPY TREATMENT NOTE
Outpatient Physical Therapy Ortho Treatment Note  [facility]     Patient Name: Bob Biggs Sr.  : 1955  MRN: [id number]  Today's Date: 2018      Visit Date: 2018    Visit Dx:    ICD-10-CM ICD-9-CM   1. Orthopedic aftercare Z47.89 V54.9   2. Impaired mobility and activities of daily living Z74.09 799.89   3. Status post left knee replacement Z96.652 V43.65   4. Chronic pain of left knee M25.562 719.46    G89.29 338.29       Patient Active Problem List   Diagnosis   • Osteoarthritis of right knee   • Status post total right knee replacement   • Diabetes (CMS/HCC)   • Arthritis   • Arthritis   • GERD (gastroesophageal reflux disease)   • Chronic pain of left knee   • Osteoarthritis of left knee   • Decreased mobility        Past Medical History:   Diagnosis Date   • Arthritis    • Diabetes mellitus (CMS/HCC)    • Finger fracture, left 2018   • GERD (gastroesophageal reflux disease)    • Painful swelling of joint    • Tear of meniscus of knee         Past Surgical History:   Procedure Laterality Date   • COLONOSCOPY     • FINGER/THUMB CLOSED REDUCTION AND PERCUTANEOUS PINNING Left 2018    Procedure: CLOSED REDUCTION AND LT. SMALL PERCUTANEOUS FINGER PINNING;  Surgeon: Raghu Porras MD;  Location: [facility];  Service: Hand   • HYDROCELECTOMY      late    • KNEE ARTHROSCOPY Left 2011   • KNEE SURGERY  2016    Right TKA   • TOTAL KNEE ARTHROPLASTY Left 2018    Procedure: TOTAL KNEE ARTHROPLASTY;  Surgeon: Nato Guerin MD;  Location: [facility];  Service: Orthopedics       PT Ortho     Row Name 18 1000       Left Lower Ext    Lt Knee Extension/Flexion AROM  75 flexion  -GJ    Lt Knee Extension/Flexion PROM  85 flexion  -GJ      User Key  (r) = Recorded By, (t) = Taken By, (c) = Cosigned By    Initials Name Provider Type    Felix Roy, PT Physical Therapist                      PT Assessment/Plan     Row Name 18 6083           PT Assessment    Assessment Comments  Patient ambulating with SC. Continue to work on static stretching lef knee. Progressing with strengthening  -WS       User Key  (r) = Recorded By, (t) = Taken By, (c) = Cosigned By    Initials Name Provider Type    Domingo Sanchez PTA Physical Therapy Assistant          Modalities     Row Name 12/19/18 1155             Moist Heat    MH Applied  Yes  -WS      Location  L quad,   -WS      Rx Minutes  -- during stretching  -WS         Ice    Ice Applied  Yes  -WS      Location  L knee, rested on bolster  -WS      Rx Minutes  10 mins  -WS        User Key  (r) = Recorded By, (t) = Taken By, (c) = Cosigned By    Initials Name Provider Type    Domingo Sanchez PTA Physical Therapy Assistant          Exercises     Row Name 12/19/18 1155             Subjective Comments    Subjective Comments  knee is stiff  -WS         Total Minutes    49167 - PT Therapeutic Exercise Minutes  45  -WS         Exercise 2    Exercise Name 2  calf raise  -WS      Cueing 2  Verbal  -WS      Reps 2  20  -WS      Additional Comments  blue foam  -WS         Exercise 4    Exercise Name 4  mini squat  -WS      Cueing 4  Verbal;Demo  -WS      Reps 4  15  -WS      Additional Comments  equal weight  -WS         Exercise 5    Exercise Name 5  TKE  -WS      Reps 5  15  -WS      Additional Comments  HB  -WS         Exercise 6    Exercise Name 6  Nustep, UE/LE  -WS      Time 6  5  -WS         Exercise 7    Exercise Name 7  hip abd, B  -WS      Cueing 7  Verbal;Demo  -WS      Time 7  20  -WS      Additional Comments  4#  -WS         Exercise 8    Exercise Name 8  standing HS curl L   -WS      Cueing 8  Verbal;Demo  -WS      Reps 8  20  -WS      Additional Comments  4#  -WS         Exercise 9    Exercise Name 9  stair stretch: HS and Quad  -WS      Reps 9  3  -WS      Time 9  30  -WS      Additional Comments  flex/ext  -WS         Exercise 10    Exercise Name 10  SAQ   -WS      Cueing 10  Verbal;Demo  -WS       Sets 10  --  -WS      Reps 10  --  -WS         Exercise 12    Exercise Name 12  Recumbent bike  -WS      Time 12  5 min  -WS      Additional Comments  seat 8  -WS         Exercise 13    Exercise Name 13  LAQ   -WS      Cueing 13  Verbal;Demo  -WS      Sets 13  2  -WS      Reps 13  10  -WS      Additional Comments  5#  -WS         Exercise 14    Exercise Name 14  leg press, LLE only   -WS      Cueing 14  Verbal;Demo  -WS      Reps 14  20  -WS      Additional Comments  60#  -WS         Exercise 15    Exercise Name 15  seated, knee flexion stretch, (seated EOB, GTB around L ankle) MH on L quad  -WS      Cueing 15  Verbal  -WS      Sets 15  3  -WS      Reps 15  2 min  -WS        User Key  (r) = Recorded By, (t) = Taken By, (c) = Cosigned By    Initials Name Provider Type    Domingo Sanchez PTA Physical Therapy Assistant                         PT OP Goals     Row Name 12/19/18 1200          PT Short Term Goals    STG 1  Patient will be independent and compliant with initial home exercise program  -WS     STG 1 Progress  Met  -WS     STG 2  Patient will be independent with patellar and scar tissue mobilization techniques.  -     STG 2 Progress  Met  -WS     STG 3  Patient will be independent with education for symptom management, joint protection and strategies to minimize stress on affected tissues  -WS     STG 3 Progress  Met  -WS     STG 4  Patient will demonstrate L knee active flexion to >/= 95 degrees  -     STG 4 Progress  Ongoing  -     STG 5  Patient will be able to bring L leg onto/off of bed and into/out of car without use of B hands.   -     STG 5 Progress  Met  -        Long Term Goals    LTG 1  Patient will be independent and compliant with advanced home exercise program to facilitate self-management of symptoms  -WS     LTG 1 Progress  Progressing  -     LTG 2  Patient will ambulate with least restrictive assistive device with near normal gait on level ground short community distances   -WS     LTG 2 Progress  Ongoing  -WS     LTG 3  Patient will demonstrate improved L knee flexion to at least 110 degrees and full knee extension for improved AROM and ease with ADLs.  -WS     LTG 3 Progress  Ongoing  -WS     LTG 4  Patient will negotiate stairs reciprocally with rail support as needed.  -WS     LTG 4 Progress  Ongoing  -WS     LTG 5  Patient will demonstrate 4+/5 L knee strength (flexion/extension) for improved function and ease with return to deficit free ambulation and stair negotiation.  -WS     LTG 5 Progress  Progressing  -       User Key  (r) = Recorded By, (t) = Taken By, (c) = Cosigned By    Initials Name Provider Type    Domingo Sanchez PTA Physical Therapy Assistant          Therapy Education  Given: HEP, Symptoms/condition management, Pain management, Edema management  Program: Reinforced  How Provided: Verbal  Provided to: Patient              Time Calculation:   Start Time: 1155  Stop Time: 1250  Time Calculation (min): 55 min  Therapy Suggested Charges     Code   Minutes Charges    06953 (CPT®) Hc Pt Neuromusc Re Education Ea 15 Min      83858 (CPT®) Hc Pt Ther Proc Ea 15 Min 45 3    25459 (CPT®) Hc Gait Training Ea 15 Min      26742 (CPT®) Hc Pt Therapeutic Act Ea 15 Min      08686 (CPT®) Hc Pt Manual Therapy Ea 15 Min      32892 (CPT®) Hc Pt Ther Massage- Per 15 Min      62542 (CPT®) Hc Pt Iontophoresis Ea 15 Min      15157 (CPT®) Hc Pt Elec Stim Ea-Per 15 Min      57338 (CPT®) Hc Pt Ultrasound Ea 15 Min      61459 (CPT®) Hc Pt Self Care/Mgmt/Train Ea 15 Min      28828 (CPT®) Hc Pt Prosthetic (S) Train Initial Encounter, Each 15 Min      53866 (CPT®) Hc Orthotic(S) Mgmt/Train Initial Encounter, Each 15min      25185 (CPT®) Hc Pt Aquatic Therapy Ea 15 Min      83038 (CPT®) Hc Pt Orthotic(S)/Prosthetic(S) Encounter, Each 15 Min       (CPT®) Hc Pt Electrical Stim Unattended      Total  45 3        Therapy Charges for Today     Code Description Service Date Service Provider  Modifiers Qty    18968182224 HC PT THER PROC EA 15 MIN 12/19/2018 Domingo Hill, LATRELL GP 3    18084340859 HC PT HOT OR COLD PACK TREAT MCARE 12/19/2018 Domingo Hill PTA GP 1                    Domingo Hill, LATRELL  12/19/2018

## 2018-12-21 ENCOUNTER — HOSPITAL ENCOUNTER (OUTPATIENT)
Dept: PHYSICAL THERAPY | Facility: HOSPITAL | Age: 63
Setting detail: THERAPIES SERIES
Discharge: HOME | End: 2018-12-21

## 2018-12-21 DIAGNOSIS — Z96.652 STATUS POST LEFT KNEE REPLACEMENT: ICD-10-CM

## 2018-12-21 DIAGNOSIS — Z78.9 IMPAIRED MOBILITY AND ACTIVITIES OF DAILY LIVING: ICD-10-CM

## 2018-12-21 DIAGNOSIS — M25.562 CHRONIC PAIN OF LEFT KNEE: ICD-10-CM

## 2018-12-21 DIAGNOSIS — G89.29 CHRONIC PAIN OF LEFT KNEE: ICD-10-CM

## 2018-12-21 DIAGNOSIS — Z47.89 ORTHOPEDIC AFTERCARE: Primary | ICD-10-CM

## 2018-12-21 DIAGNOSIS — Z74.09 IMPAIRED MOBILITY AND ACTIVITIES OF DAILY LIVING: ICD-10-CM

## 2018-12-21 PROCEDURE — 97110 THERAPEUTIC EXERCISES: CPT

## 2018-12-21 NOTE — THERAPY TREATMENT NOTE
Outpatient Physical Therapy Ortho Treatment Note  [facility]     Patient Name: Bob Biggs Sr.  : 1955  MRN: [id number]  Today's Date: 2018      Visit Date: 2018    Visit Dx:    ICD-10-CM ICD-9-CM   1. Orthopedic aftercare Z47.89 V54.9   2. Impaired mobility and activities of daily living Z74.09 799.89   3. Status post left knee replacement Z96.652 V43.65   4. Chronic pain of left knee M25.562 719.46    G89.29 338.29       Patient Active Problem List   Diagnosis   • Osteoarthritis of right knee   • Status post total right knee replacement   • Diabetes (CMS/HCC)   • Arthritis   • Arthritis   • GERD (gastroesophageal reflux disease)   • Chronic pain of left knee   • Osteoarthritis of left knee   • Decreased mobility        Past Medical History:   Diagnosis Date   • Arthritis    • Diabetes mellitus (CMS/HCC)    • Finger fracture, left 2018   • GERD (gastroesophageal reflux disease)    • Painful swelling of joint    • Tear of meniscus of knee         Past Surgical History:   Procedure Laterality Date   • COLONOSCOPY     • FINGER/THUMB CLOSED REDUCTION AND PERCUTANEOUS PINNING Left 2018    Procedure: CLOSED REDUCTION AND LT. SMALL PERCUTANEOUS FINGER PINNING;  Surgeon: Raghu Porras MD;  Location: [facility];  Service: Hand   • HYDROCELECTOMY      late    • KNEE ARTHROSCOPY Left 2011   • KNEE SURGERY  2016    Right TKA   • TOTAL KNEE ARTHROPLASTY Left 2018    Procedure: TOTAL KNEE ARTHROPLASTY;  Surgeon: Nato Guerin MD;  Location: [facility];  Service: Orthopedics                       PT Assessment/Plan     Row Name 18 1121          PT Assessment    Assessment Comments  Continue to work on increasing ROM/strength. Follow up with surgeon next week  -AVINASH       User Key  (r) = Recorded By, (t) = Taken By, (c) = Cosigned By    Initials Name Provider Type    Domingo Sanchez PTA Physical Therapy Assistant          Modalities     Row  Name 12/21/18 1030             Moist Heat    MH Applied  Yes  -WS      Location  L quad,   -WS         Ice    Ice Applied  Yes  -WS      Location  L knee, rested on bolster  -WS      Rx Minutes  10 mins  -WS        User Key  (r) = Recorded By, (t) = Taken By, (c) = Cosigned By    Initials Name Provider Type    WS Domingo Hill PTA Physical Therapy Assistant          Exercises     Row Name 12/21/18 1030             Subjective Comments    Subjective Comments  knee pain and stiff  -WS         Subjective Pain    Able to rate subjective pain?  yes  -WS      Pre-Treatment Pain Level  5  -WS         Total Minutes    08233 - PT Therapeutic Exercise Minutes  45  -WS         Exercise 2    Exercise Name 2  calf raise  -WS      Cueing 2  Verbal  -WS      Reps 2  20  -WS      Time 2  5 sec  -WS      Additional Comments  blue foam  -WS         Exercise 4    Exercise Name 4  mini squat  -WS      Cueing 4  Verbal;Demo  -WS      Reps 4  15  -WS      Additional Comments  equal weight  -WS         Exercise 5    Exercise Name 5  TKE  -WS      Reps 5  15  -WS      Additional Comments  HB  -WS         Exercise 6    Exercise Name 6  Nustep, UE/LE  -WS      Time 6  5  -WS         Exercise 7    Exercise Name 7  hip abd, B  -WS      Cueing 7  Verbal;Demo  -WS      Time 7  20  -WS      Additional Comments  4#  -WS         Exercise 8    Exercise Name 8  standing HS curl L   -WS      Cueing 8  Verbal;Demo  -WS      Reps 8  20  -WS      Additional Comments  4#  -WS         Exercise 9    Exercise Name 9  stair stretch: HS and Quad  -WS      Reps 9  3  -WS      Time 9  30  -WS      Additional Comments  flex/ext  -WS         Exercise 12    Exercise Name 12  Recumbent bike  -WS      Time 12  5 min  -WS      Additional Comments  seat 8  -WS         Exercise 13    Exercise Name 13  LAQ   -WS      Cueing 13  Verbal;Demo  -WS      Sets 13  2  -WS      Reps 13  10  -WS      Additional Comments  5#  -WS         Exercise 14    Exercise Name 14  leg  press, LLE only   -      Cueing 14  Verbal;Demo  -      Reps 14  20  -WS      Additional Comments  70#  -WS         Exercise 15    Exercise Name 15  seated, knee flexion stretch, (seated EOB, GTB around L ankle) MH on L quad  -      Cueing 15  Verbal  -WS      Sets 15  3  -WS      Reps 15  2 min  -        User Key  (r) = Recorded By, (t) = Taken By, (c) = Cosigned By    Initials Name Provider Type    Domingo Sanchez PTA Physical Therapy Assistant                         PT OP Goals     Row Name 12/21/18 1100          PT Short Term Goals    STG 1  Patient will be independent and compliant with initial home exercise program  -WS     STG 1 Progress  Met  -WS     STG 2  Patient will be independent with patellar and scar tissue mobilization techniques.  -     STG 2 Progress  Met  -     STG 3  Patient will be independent with education for symptom management, joint protection and strategies to minimize stress on affected tissues  -WS     STG 3 Progress  Met  -     STG 4  Patient will demonstrate L knee active flexion to >/= 95 degrees  -     STG 4 Progress  Ongoing  -     STG 5  Patient will be able to bring L leg onto/off of bed and into/out of car without use of B hands.   -     STG 5 Progress  Met  -        Long Term Goals    LTG 1  Patient will be independent and compliant with advanced home exercise program to facilitate self-management of symptoms  -     LTG 1 Progress  Progressing  -     LTG 2  Patient will ambulate with least restrictive assistive device with near normal gait on level ground short community distances  -     LTG 2 Progress  Ongoing  -     LTG 3  Patient will demonstrate improved L knee flexion to at least 110 degrees and full knee extension for improved AROM and ease with ADLs.  -     LTG 3 Progress  Ongoing  -WS     LTG 4  Patient will negotiate stairs reciprocally with rail support as needed.  -     LTG 4 Progress  Ongoing  -     LTG 5  Patient will  demonstrate 4+/5 L knee strength (flexion/extension) for improved function and ease with return to deficit free ambulation and stair negotiation.  -     LTG 5 Progress  Progressing  -       User Key  (r) = Recorded By, (t) = Taken By, (c) = Cosigned By    Initials Name Provider Type    Domingo Sanchez PTA Physical Therapy Assistant          Therapy Education  Given: HEP, Symptoms/condition management, Pain management  How Provided: Verbal  Provided to: Patient              Time Calculation:      Therapy Suggested Charges     Code   Minutes Charges    58196 (CPT®) Hc Pt Neuromusc Re Education Ea 15 Min      79773 (CPT®) Hc Pt Ther Proc Ea 15 Min 45 3    04975 (CPT®) Hc Gait Training Ea 15 Min      41945 (CPT®) Hc Pt Therapeutic Act Ea 15 Min      11584 (CPT®) Hc Pt Manual Therapy Ea 15 Min      71642 (CPT®) Hc Pt Ther Massage- Per 15 Min      70249 (CPT®) Hc Pt Iontophoresis Ea 15 Min      75195 (CPT®) Hc Pt Elec Stim Ea-Per 15 Min      10647 (CPT®) Hc Pt Ultrasound Ea 15 Min      55754 (CPT®) Hc Pt Self Care/Mgmt/Train Ea 15 Min      43351 (CPT®) Hc Pt Prosthetic (S) Train Initial Encounter, Each 15 Min      81669 (CPT®) Hc Orthotic(S) Mgmt/Train Initial Encounter, Each 15min      53570 (CPT®) Hc Pt Aquatic Therapy Ea 15 Min      19007 (CPT®) Hc Pt Orthotic(S)/Prosthetic(S) Encounter, Each 15 Min       (CPT®) Hc Pt Electrical Stim Unattended      Total  45 3        Therapy Charges for Today     Code Description Service Date Service Provider Modifiers Qty    77393283522 HC PT THER PROC EA 15 MIN 12/21/2018 Domingo Hill PTA GP 3    99856321091 HC PT HOT OR COLD PACK TREAT MCARE 12/21/2018 Domingo Hill PTA GP 1                    Domingo Hill PTA  12/21/2018

## 2018-12-26 ENCOUNTER — HOSPITAL ENCOUNTER (OUTPATIENT)
Dept: PHYSICAL THERAPY | Facility: HOSPITAL | Age: 63
Setting detail: THERAPIES SERIES
Discharge: HOME | End: 2018-12-26

## 2018-12-26 DIAGNOSIS — Z74.09 IMPAIRED MOBILITY AND ACTIVITIES OF DAILY LIVING: ICD-10-CM

## 2018-12-26 DIAGNOSIS — Z96.652 STATUS POST LEFT KNEE REPLACEMENT: ICD-10-CM

## 2018-12-26 DIAGNOSIS — G89.29 CHRONIC PAIN OF LEFT KNEE: ICD-10-CM

## 2018-12-26 DIAGNOSIS — Z78.9 IMPAIRED MOBILITY AND ACTIVITIES OF DAILY LIVING: ICD-10-CM

## 2018-12-26 DIAGNOSIS — M25.562 CHRONIC PAIN OF LEFT KNEE: ICD-10-CM

## 2018-12-26 DIAGNOSIS — Z47.89 ORTHOPEDIC AFTERCARE: Primary | ICD-10-CM

## 2018-12-26 PROCEDURE — 97110 THERAPEUTIC EXERCISES: CPT

## 2018-12-26 RX ORDER — OXYCODONE AND ACETAMINOPHEN 7.5; 325 MG/1; MG/1
1 TABLET ORAL EVERY 4 HOURS PRN
Qty: 60 TABLET | Refills: 0 | Status: SHIPPED | OUTPATIENT
Start: 2018-12-26 | End: 2019-01-07 | Stop reason: SDUPTHER

## 2018-12-26 NOTE — THERAPY TREATMENT NOTE
Outpatient Physical Therapy Ortho Treatment Note  [facility]     Patient Name: Bob Biggs Sr.  : 1955  MRN: [id number]  Today's Date: 2018      Visit Date: 2018    Visit Dx:    ICD-10-CM ICD-9-CM   1. Orthopedic aftercare Z47.89 V54.9   2. Impaired mobility and activities of daily living Z74.09 799.89   3. Status post left knee replacement Z96.652 V43.65   4. Chronic pain of left knee M25.562 719.46    G89.29 338.29       Patient Active Problem List   Diagnosis   • Osteoarthritis of right knee   • Status post total right knee replacement   • Diabetes (CMS/HCC)   • Arthritis   • Arthritis   • GERD (gastroesophageal reflux disease)   • Chronic pain of left knee   • Osteoarthritis of left knee   • Decreased mobility        Past Medical History:   Diagnosis Date   • Arthritis    • Diabetes mellitus (CMS/HCC)    • Finger fracture, left 2018   • GERD (gastroesophageal reflux disease)    • Painful swelling of joint    • Tear of meniscus of knee         Past Surgical History:   Procedure Laterality Date   • COLONOSCOPY     • FINGER/THUMB CLOSED REDUCTION AND PERCUTANEOUS PINNING Left 2018    Procedure: CLOSED REDUCTION AND LT. SMALL PERCUTANEOUS FINGER PINNING;  Surgeon: Raghu Porras MD;  Location: [facility];  Service: Hand   • HYDROCELECTOMY      late    • KNEE ARTHROSCOPY Left 2011   • KNEE SURGERY  2016    Right TKA   • TOTAL KNEE ARTHROPLASTY Left 2018    Procedure: TOTAL KNEE ARTHROPLASTY;  Surgeon: Nato Guerin MD;  Location: [facility];  Service: Orthopedics                       PT Assessment/Plan     Row Name 18 1122          PT Assessment    Assessment Comments  Patient reports improved wall slide at home. Will see MD Dec 31.   -AVINASH       User Key  (r) = Recorded By, (t) = Taken By, (c) = Cosigned By    Initials Name Provider Type    Domingo Sanchez PTA Physical Therapy Assistant          Modalities     Row Name 18  1035             Moist Heat    MH Applied  Yes  -WS      Location  L quad,   -WS         Ice    Ice Applied  Yes  -WS      Location  L knee, rested on bolster  -WS      Rx Minutes  10 mins  -WS      Ice Prior to Rx  Yes  -WS        User Key  (r) = Recorded By, (t) = Taken By, (c) = Cosigned By    Initials Name Provider Type    WS Domingo Hill PTA Physical Therapy Assistant          Exercises     Row Name 12/26/18 1035             Subjective Pain    Able to rate subjective pain?  yes  -WS      Pre-Treatment Pain Level  3  -WS      Subjective Pain Comment  MD Dec 31  -WS         Total Minutes    31700 - PT Therapeutic Exercise Minutes  45  -WS         Exercise 2    Exercise Name 2  calf raise  -WS      Cueing 2  Verbal  -WS      Reps 2  20  -WS      Time 2  5 sec  -WS      Additional Comments  blue foam  -WS         Exercise 4    Exercise Name 4  mini squat  -WS      Cueing 4  Verbal;Demo  -WS      Reps 4  15  -WS         Exercise 5    Exercise Name 5  TKE  -WS      Reps 5  15  -WS      Additional Comments  HB  -WS         Exercise 6    Exercise Name 6  Nustep, UE/LE  -WS      Time 6  5  -WS         Exercise 7    Exercise Name 7  hip abd, B  -WS      Cueing 7  Verbal;Demo  -WS      Time 7  20  -WS      Additional Comments  4#  -WS         Exercise 8    Exercise Name 8  standing HS curl L   -WS      Cueing 8  Verbal;Demo  -WS      Reps 8  20  -WS      Additional Comments  4#  -WS         Exercise 9    Exercise Name 9  stair stretch: HS and Quad  -WS      Reps 9  3  -WS      Time 9  30  -WS      Additional Comments  flex/ext  -WS         Exercise 12    Exercise Name 12  Recumbent bike  -WS      Time 12  5 min  -WS      Additional Comments  seat 8  -WS         Exercise 13    Exercise Name 13  LAQ   -WS      Cueing 13  Verbal;Demo  -WS      Sets 13  2  -WS      Reps 13  10  -WS      Additional Comments  5#  -WS         Exercise 14    Exercise Name 14  leg press, LLE only   -WS      Cueing 14  Verbal;Demo  -WS       Reps 14  20  -WS      Additional Comments  70#  -WS         Exercise 15    Exercise Name 15  seated, knee flexion stretch, (seated EOB, GTB around L ankle) MH on L quad  -      Cueing 15  Verbal  -      Sets 15  3  -WS      Reps 15  2 min  -        User Key  (r) = Recorded By, (t) = Taken By, (c) = Cosigned By    Initials Name Provider Type     Domingo Hill PTA Physical Therapy Assistant                         PT OP Goals     Row Name 12/26/18 1100          PT Short Term Goals    STG 1  Patient will be independent and compliant with initial home exercise program  -WS     STG 1 Progress  Met  -WS     STG 2  Patient will be independent with patellar and scar tissue mobilization techniques.  -WS     STG 2 Progress  Met  -     STG 3  Patient will be independent with education for symptom management, joint protection and strategies to minimize stress on affected tissues  -WS     STG 3 Progress  Met  -     STG 4  Patient will demonstrate L knee active flexion to >/= 95 degrees  -     STG 4 Progress  Ongoing  -     STG 5  Patient will be able to bring L leg onto/off of bed and into/out of car without use of B hands.   -     STG 5 Progress  Met  -        Long Term Goals    LTG 1  Patient will be independent and compliant with advanced home exercise program to facilitate self-management of symptoms  -     LTG 1 Progress  Progressing  -     LTG 2  Patient will ambulate with least restrictive assistive device with near normal gait on level ground short community distances  -     LTG 2 Progress  Ongoing  -WS     LTG 3  Patient will demonstrate improved L knee flexion to at least 110 degrees and full knee extension for improved AROM and ease with ADLs.  -     LTG 3 Progress  Ongoing  -     LTG 4  Patient will negotiate stairs reciprocally with rail support as needed.  -     LTG 4 Progress  Ongoing  -     LTG 5  Patient will demonstrate 4+/5 L knee strength (flexion/extension) for improved  function and ease with return to deficit free ambulation and stair negotiation.  -     LTG 5 Progress  Progressing  -       User Key  (r) = Recorded By, (t) = Taken By, (c) = Cosigned By    Initials Name Provider Type    Domingo Sanchez PTA Physical Therapy Assistant          Therapy Education  Given: HEP, Symptoms/condition management, Pain management  Program: Reinforced  How Provided: Verbal  Provided to: Patient              Time Calculation:   Start Time: 1035  Stop Time: 1130  Time Calculation (min): 55 min  Therapy Suggested Charges     Code   Minutes Charges    88654 (CPT®) Hc Pt Neuromusc Re Education Ea 15 Min      03492 (CPT®) Hc Pt Ther Proc Ea 15 Min 45 3    34539 (CPT®) Hc Gait Training Ea 15 Min      16037 (CPT®) Hc Pt Therapeutic Act Ea 15 Min      73796 (CPT®) Hc Pt Manual Therapy Ea 15 Min      99558 (CPT®) Hc Pt Ther Massage- Per 15 Min      44441 (CPT®) Hc Pt Iontophoresis Ea 15 Min      41473 (CPT®) Hc Pt Elec Stim Ea-Per 15 Min      15924 (CPT®) Hc Pt Ultrasound Ea 15 Min      57445 (CPT®) Hc Pt Self Care/Mgmt/Train Ea 15 Min      65709 (CPT®) Hc Pt Prosthetic (S) Train Initial Encounter, Each 15 Min      65716 (CPT®) Hc Orthotic(S) Mgmt/Train Initial Encounter, Each 15min      39962 (CPT®) Hc Pt Aquatic Therapy Ea 15 Min      77198 (CPT®) Hc Pt Orthotic(S)/Prosthetic(S) Encounter, Each 15 Min       (CPT®) Hc Pt Electrical Stim Unattended      Total  45 3        Therapy Charges for Today     Code Description Service Date Service Provider Modifiers Qty    55083535836 HC PT THER PROC EA 15 MIN 12/26/2018 Domingo Hill PTA GP 3    62618789619 HC PT HOT OR COLD PACK TREAT MCARE 12/26/2018 Domingo Hill PTA GP 1                    Domingo Hill PTA  12/26/2018

## 2018-12-28 ENCOUNTER — TELEPHONE (OUTPATIENT)
Dept: ORTHOPEDIC SURGERY | Facility: CLINIC | Age: 63
End: 2018-12-28

## 2018-12-28 ENCOUNTER — TELEPHONE (OUTPATIENT)
Dept: INTERNAL MEDICINE | Facility: CLINIC | Age: 63
End: 2018-12-28

## 2018-12-28 ENCOUNTER — HOSPITAL ENCOUNTER (OUTPATIENT)
Dept: PHYSICAL THERAPY | Facility: HOSPITAL | Age: 63
Setting detail: THERAPIES SERIES
Discharge: HOME | End: 2018-12-28

## 2018-12-28 DIAGNOSIS — Z78.9 IMPAIRED MOBILITY AND ACTIVITIES OF DAILY LIVING: ICD-10-CM

## 2018-12-28 DIAGNOSIS — R21 RASH: ICD-10-CM

## 2018-12-28 DIAGNOSIS — Z96.652 STATUS POST LEFT KNEE REPLACEMENT: ICD-10-CM

## 2018-12-28 DIAGNOSIS — G89.29 CHRONIC PAIN OF LEFT KNEE: ICD-10-CM

## 2018-12-28 DIAGNOSIS — IMO0001 UNCONTROLLED TYPE 2 DIABETES MELLITUS WITHOUT COMPLICATION, WITHOUT LONG-TERM CURRENT USE OF INSULIN: Primary | ICD-10-CM

## 2018-12-28 DIAGNOSIS — Z74.09 IMPAIRED MOBILITY AND ACTIVITIES OF DAILY LIVING: ICD-10-CM

## 2018-12-28 DIAGNOSIS — M25.562 CHRONIC PAIN OF LEFT KNEE: ICD-10-CM

## 2018-12-28 DIAGNOSIS — Z47.89 ORTHOPEDIC AFTERCARE: Primary | ICD-10-CM

## 2018-12-28 PROCEDURE — 97110 THERAPEUTIC EXERCISES: CPT

## 2018-12-28 RX ORDER — CEPHALEXIN 500 MG/1
CAPSULE ORAL
Qty: 4 CAPSULE | Refills: 0 | Status: SHIPPED | OUTPATIENT
Start: 2018-12-28 | End: 2019-04-08

## 2018-12-28 RX ORDER — BETAMETHASONE DIPROPIONATE 0.5 MG/G
1 CREAM TOPICAL AS NEEDED
Qty: 15 G | Refills: 0 | Status: SHIPPED | OUTPATIENT
Start: 2018-12-28 | End: 2020-07-06 | Stop reason: SDUPTHER

## 2018-12-28 NOTE — TELEPHONE ENCOUNTER
----- Message from Naomi Kaur sent at 12/28/2018  9:53 AM EST -----  Pt is requesting a refill on 3 prescriptions:    betamethasone dipropionate (DIPROLENE) 0.05 % cream  ONE TOUCH LANCETS  One Touch glucose blood test strip     To be sent to the [facility] in his chart. Pt's callback is [phone]. Thank you.

## 2018-12-28 NOTE — THERAPY TREATMENT NOTE
Outpatient Physical Therapy Ortho Treatment Note  [facility]     Patient Name: Bob Biggs Sr.  : 1955  MRN: [id number]  Today's Date: 2018      Visit Date: 2018    Visit Dx:    ICD-10-CM ICD-9-CM   1. Orthopedic aftercare Z47.89 V54.9   2. Impaired mobility and activities of daily living Z74.09 799.89   3. Status post left knee replacement Z96.652 V43.65   4. Chronic pain of left knee M25.562 719.46    G89.29 338.29       Patient Active Problem List   Diagnosis   • Osteoarthritis of right knee   • Status post total right knee replacement   • Diabetes (CMS/HCC)   • Arthritis   • Arthritis   • GERD (gastroesophageal reflux disease)   • Chronic pain of left knee   • Osteoarthritis of left knee   • Decreased mobility        Past Medical History:   Diagnosis Date   • Arthritis    • Diabetes mellitus (CMS/HCC)    • Finger fracture, left 2018   • GERD (gastroesophageal reflux disease)    • Painful swelling of joint    • Tear of meniscus of knee         Past Surgical History:   Procedure Laterality Date   • COLONOSCOPY     • FINGER/THUMB CLOSED REDUCTION AND PERCUTANEOUS PINNING Left 2018    Procedure: CLOSED REDUCTION AND LT. SMALL PERCUTANEOUS FINGER PINNING;  Surgeon: Raghu Porras MD;  Location: [facility];  Service: Hand   • HYDROCELECTOMY      late    • KNEE ARTHROSCOPY Left 2011   • KNEE SURGERY  2016    Right TKA   • TOTAL KNEE ARTHROPLASTY Left 2018    Procedure: TOTAL KNEE ARTHROPLASTY;  Surgeon: Nato Guerin MD;  Location: [facility];  Service: Orthopedics                       PT Assessment/Plan     Row Name 18 1305          PT Assessment    Assessment Comments  Patient ambulating with SC. AROM left knee 15-80. PROM 5-90 with over pressure. Coontinue to work on increasing ROM/strength  -AVINASH       User Key  (r) = Recorded By, (t) = Taken By, (c) = Cosigned By    Initials Name Provider Type    Domingo Sanchez PTA Physical  Therapy Assistant          Modalities     Row Name 12/28/18 1215             Moist Heat    MH Applied  Yes  -WS      Location  L quad,   -WS         Ice    Ice Applied  Yes  -WS      Location  L knee, rested on bolster  -WS      Rx Minutes  10 mins  -WS      Ice S/P Rx  Yes  -WS        User Key  (r) = Recorded By, (t) = Taken By, (c) = Cosigned By    Initials Name Provider Type    WS Domingo Hill PTA Physical Therapy Assistant          Exercises     Row Name 12/28/18 1215             Subjective Comments    Subjective Comments  Been walking a lot today  -WS         Subjective Pain    Able to rate subjective pain?  yes  -WS      Pre-Treatment Pain Level  7  -WS         Total Minutes    80076 - PT Therapeutic Exercise Minutes  45  -WS         Exercise 2    Exercise Name 2  calf raise  -WS      Cueing 2  Verbal  -WS      Reps 2  20  -WS      Time 2  5 sec  -WS      Additional Comments  blue foam  -WS         Exercise 4    Exercise Name 4  mini squat  -WS      Cueing 4  Verbal;Demo  -WS      Reps 4  15  -WS         Exercise 5    Exercise Name 5  TKE  -WS      Reps 5  15  -WS      Additional Comments  HB  -WS         Exercise 6    Exercise Name 6  Nustep, UE/LE  -WS      Time 6  5  -WS         Exercise 7    Exercise Name 7  hip abd,   -WS      Cueing 7  Verbal;Demo  -WS      Time 7  20  -WS      Additional Comments  4#  -WS         Exercise 8    Exercise Name 8  standing HS curl L   -WS      Cueing 8  Verbal;Demo  -WS      Reps 8  20  -WS      Additional Comments  4#  -WS         Exercise 9    Exercise Name 9  stair stretch: HS and Quad  -WS      Reps 9  3  -WS      Time 9  30  -WS      Additional Comments  flex/ext  -WS         Exercise 12    Exercise Name 12  Recumbent bike  -WS      Time 12  5 min  -WS      Additional Comments  seat 8  -WS         Exercise 13    Exercise Name 13  LAQ   -WS      Cueing 13  Verbal;Demo  -WS      Sets 13  2  -WS      Reps 13  10  -WS      Additional Comments  5#  -WS          Exercise 14    Exercise Name 14  leg press, LLE only   -WS      Cueing 14  Verbal;Demo  -WS      Reps 14  40  -WS      Additional Comments  75#  -WS         Exercise 15    Exercise Name 15  seated, knee flexion stretch, (seated EOB, GTB around L ankle) MH on L quad  -WS      Cueing 15  Verbal  -WS      Sets 15  3  -WS      Reps 15  2 min  -WS        User Key  (r) = Recorded By, (t) = Taken By, (c) = Cosigned By    Initials Name Provider Type    Domingo Sanchez PTA Physical Therapy Assistant                         PT OP Goals     Row Name 12/28/18 1300          PT Short Term Goals    STG 1  Patient will be independent and compliant with initial home exercise program  -WS     STG 1 Progress  Met  -WS     STG 2  Patient will be independent with patellar and scar tissue mobilization techniques.  -WS     STG 2 Progress  Met  -     STG 3  Patient will be independent with education for symptom management, joint protection and strategies to minimize stress on affected tissues  -WS     STG 3 Progress  Met  -WS     STG 4  Patient will demonstrate L knee active flexion to >/= 95 degrees  -     STG 4 Progress  Ongoing  -     STG 5  Patient will be able to bring L leg onto/off of bed and into/out of car without use of B hands.   -     STG 5 Progress  Met  -        Long Term Goals    LTG 1  Patient will be independent and compliant with advanced home exercise program to facilitate self-management of symptoms  -     LTG 1 Progress  Progressing  -     LTG 2  Patient will ambulate with least restrictive assistive device with near normal gait on level ground short community distances  -     LTG 2 Progress  Ongoing  -     LTG 3  Patient will demonstrate improved L knee flexion to at least 110 degrees and full knee extension for improved AROM and ease with ADLs.  -     LTG 3 Progress  Ongoing  -     LTG 4  Patient will negotiate stairs reciprocally with rail support as needed.  -     LTG 4 Progress   Ongoing  -     LTG 5  Patient will demonstrate 4+/5 L knee strength (flexion/extension) for improved function and ease with return to deficit free ambulation and stair negotiation.  -     LTG 5 Progress  Progressing  -       User Key  (r) = Recorded By, (t) = Taken By, (c) = Cosigned By    Initials Name Provider Type    Domingo Sanchez PTA Physical Therapy Assistant          Therapy Education  Given: HEP, Symptoms/condition management, Edema management, Pain management  Program: Reinforced  How Provided: Verbal  Provided to: Patient              Time Calculation:   Start Time: 1225  Stop Time: 1320  Time Calculation (min): 55 min  Therapy Suggested Charges     Code   Minutes Charges    13087 (CPT®) Hc Pt Neuromusc Re Education Ea 15 Min      49640 (CPT®) Hc Pt Ther Proc Ea 15 Min 45 3    68119 (CPT®) Hc Gait Training Ea 15 Min      26354 (CPT®) Hc Pt Therapeutic Act Ea 15 Min      25272 (CPT®) Hc Pt Manual Therapy Ea 15 Min      75217 (CPT®) Hc Pt Ther Massage- Per 15 Min      74563 (CPT®) Hc Pt Iontophoresis Ea 15 Min      79876 (CPT®) Hc Pt Elec Stim Ea-Per 15 Min      70955 (CPT®) Hc Pt Ultrasound Ea 15 Min      41211 (CPT®) Hc Pt Self Care/Mgmt/Train Ea 15 Min      60669 (CPT®) Hc Pt Prosthetic (S) Train Initial Encounter, Each 15 Min      75178 (CPT®) Hc Orthotic(S) Mgmt/Train Initial Encounter, Each 15min      04080 (CPT®) Hc Pt Aquatic Therapy Ea 15 Min      05654 (CPT®) Hc Pt Orthotic(S)/Prosthetic(S) Encounter, Each 15 Min       (CPT®) Hc Pt Electrical Stim Unattended      Total  45 3        Therapy Charges for Today     Code Description Service Date Service Provider Modifiers Qty    17635803700 HC PT THER PROC EA 15 MIN 12/28/2018 Domingo Hill PTA GP 3    45823298604 HC PT HOT OR COLD PACK TREAT MCARE 12/28/2018 Domingo Hill PTA GP 1                    Domingo Hill PTA  12/28/2018

## 2018-12-31 ENCOUNTER — HOSPITAL ENCOUNTER (OUTPATIENT)
Dept: PHYSICAL THERAPY | Facility: HOSPITAL | Age: 63
Setting detail: THERAPIES SERIES
Discharge: HOME | End: 2018-12-31

## 2018-12-31 ENCOUNTER — OFFICE VISIT (OUTPATIENT)
Dept: ORTHOPEDIC SURGERY | Facility: CLINIC | Age: 63
End: 2018-12-31

## 2018-12-31 VITALS — BODY MASS INDEX: 26.6 KG/M2 | HEIGHT: 71 IN | TEMPERATURE: 97.2 F | WEIGHT: 190 LBS

## 2018-12-31 DIAGNOSIS — Z96.651 HISTORY OF TOTAL KNEE ARTHROPLASTY, RIGHT: Primary | ICD-10-CM

## 2018-12-31 DIAGNOSIS — Z74.09 IMPAIRED MOBILITY AND ACTIVITIES OF DAILY LIVING: ICD-10-CM

## 2018-12-31 DIAGNOSIS — Z78.9 IMPAIRED MOBILITY AND ACTIVITIES OF DAILY LIVING: ICD-10-CM

## 2018-12-31 DIAGNOSIS — Z47.89 ORTHOPEDIC AFTERCARE: Primary | ICD-10-CM

## 2018-12-31 DIAGNOSIS — Z96.652 STATUS POST LEFT KNEE REPLACEMENT: ICD-10-CM

## 2018-12-31 DIAGNOSIS — G89.29 CHRONIC PAIN OF LEFT KNEE: ICD-10-CM

## 2018-12-31 DIAGNOSIS — M25.562 CHRONIC PAIN OF LEFT KNEE: ICD-10-CM

## 2018-12-31 PROCEDURE — 73562 X-RAY EXAM OF KNEE 3: CPT | Performed by: ORTHOPAEDIC SURGERY

## 2018-12-31 PROCEDURE — 99024 POSTOP FOLLOW-UP VISIT: CPT | Performed by: ORTHOPAEDIC SURGERY

## 2018-12-31 PROCEDURE — 97110 THERAPEUTIC EXERCISES: CPT

## 2018-12-31 NOTE — PROGRESS NOTES
Bob Biggs . : 1955 MRN: [id number] DATE: 2018    DIAGNOSIS: 6 week follow up left TKA      SUBJECTIVE:  Patient returns today for 6 week follow up of left total knee replacement. Patient reports being very frustrated.  He reports compliance with therapy but states that he is having trouble getting his knee moving.  He feels like the right knee was much easier to recover.    There were no vitals filed for this visit.    OBJECTIVE:     Exam:  The incision is well healed. No sign of infection. Range of motion is measured at near full extension, lacking roughly 3-5°.  Flexion is to 90°. The calf is soft and nontender with a negative Homans sign.  Gait is reciprocal heel-to-toe and only mildly antalgic.    DIAGNOSTIC STUDIES    Xrays: 3 views of the left knee (AP, lateral, and sunrise) were ordered and reviewed for evaluation of recent knee replacement. They demonstrate a well positioned, well aligned knee replacement without complicating factors noted. In comparison with previous films there has been no change.    ASSESSMENT:  6 week status post left knee replacement.    PLAN:   We talked about a possible manipulation for him.  He is adamantly opposed to that.  He states that he cannot afford any more intervention at this point given that his deductible is going to reset tomorrow.  He was very frustrated by his progress with the left knee.  He asked why I didn't put the same implant in as the right side.  I explained this to him in detail.  I've encouraged him to keep working on his therapy exercises.  I will see him back in 1 month.    MD Patricia Gold, APRN  2018

## 2019-01-02 ENCOUNTER — HOSPITAL ENCOUNTER (OUTPATIENT)
Dept: PHYSICAL THERAPY | Facility: HOSPITAL | Age: 64
Setting detail: THERAPIES SERIES
Discharge: HOME | End: 2019-01-02

## 2019-01-02 DIAGNOSIS — M25.562 CHRONIC PAIN OF LEFT KNEE: ICD-10-CM

## 2019-01-02 DIAGNOSIS — G89.29 CHRONIC PAIN OF LEFT KNEE: ICD-10-CM

## 2019-01-02 DIAGNOSIS — Z47.89 ORTHOPEDIC AFTERCARE: Primary | ICD-10-CM

## 2019-01-02 DIAGNOSIS — Z78.9 IMPAIRED MOBILITY AND ACTIVITIES OF DAILY LIVING: ICD-10-CM

## 2019-01-02 DIAGNOSIS — Z74.09 IMPAIRED MOBILITY AND ACTIVITIES OF DAILY LIVING: ICD-10-CM

## 2019-01-02 DIAGNOSIS — Z96.652 STATUS POST LEFT KNEE REPLACEMENT: ICD-10-CM

## 2019-01-02 PROCEDURE — 97140 MANUAL THERAPY 1/> REGIONS: CPT | Performed by: PHYSICAL THERAPIST

## 2019-01-02 PROCEDURE — 97110 THERAPEUTIC EXERCISES: CPT | Performed by: PHYSICAL THERAPIST

## 2019-01-02 NOTE — THERAPY RE-EVALUATION
Outpatient Physical Therapy Ortho Re-Assessment  [facility]     Patient Name: Bob Biggs Sr.  : 1955  MRN: [id number]  Today's Date: 2019      Visit Date: 2019    Patient Active Problem List   Diagnosis   • Osteoarthritis of right knee   • Status post total right knee replacement   • Diabetes (CMS/HCC)   • Arthritis   • Arthritis   • GERD (gastroesophageal reflux disease)   • Chronic pain of left knee   • Osteoarthritis of left knee   • Decreased mobility        Past Medical History:   Diagnosis Date   • Arthritis    • Diabetes mellitus (CMS/HCC)    • Finger fracture, left 2018   • GERD (gastroesophageal reflux disease)    • Painful swelling of joint    • Tear of meniscus of knee         Past Surgical History:   Procedure Laterality Date   • COLONOSCOPY     • FINGER/THUMB CLOSED REDUCTION AND PERCUTANEOUS PINNING Left 2018    Procedure: CLOSED REDUCTION AND LT. SMALL PERCUTANEOUS FINGER PINNING;  Surgeon: Raghu Porras MD;  Location: [facility];  Service: Hand   • HYDROCELECTOMY      late    • KNEE ARTHROSCOPY Left ,    • KNEE SURGERY  2016    Right TKA   • TOTAL KNEE ARTHROPLASTY Left 2018    Procedure: TOTAL KNEE ARTHROPLASTY;  Surgeon: aNto Guerin MD;  Location: [facility];  Service: Orthopedics       Visit Dx:     ICD-10-CM ICD-9-CM   1. Orthopedic aftercare Z47.89 V54.9   2. Impaired mobility and activities of daily living Z74.09 799.89   3. Status post left knee replacement Z96.652 V43.65   4. Chronic pain of left knee M25.562 719.46    G89.29 338.29           PT Ortho     Row Name 19 1200       Left Lower Ext    Lt Knee Extension/Flexion AROM  -5/90  -GR    Lt Knee Extension/Flexion PROM  0/94  -GR       MMT Left Lower Ext    Lt Knee Extension MMT, Gross Movement  (4-/5) good minus within available range  -GR    Lt Knee Flexion MMT, Gross Movement  (4-/5) good minus within available range  -GR      User Key  (r) = Recorded  By, (t) = Taken By, (c) = Cosigned By    Initials Name Provider Type    GR Tuan Rueda, PT Physical Therapist                      Therapy Education  Education Details: scar tissue management and diabetes; benefits of IASTM and recommend increased fluids following     PT OP Goals     Row Name 01/02/19 1100          PT Short Term Goals    STG 1  Patient will be independent and compliant with initial home exercise program  -     STG 1 Progress  Met  -GR     STG 2  Patient will be independent with patellar and scar tissue mobilization techniques.  -GR     STG 2 Progress  Met  -GR     STG 3  Patient will be independent with education for symptom management, joint protection and strategies to minimize stress on affected tissues  -GR     STG 3 Progress  Met  -GR     STG 4  Patient will demonstrate L knee active flexion to >/= 95 degrees  -     STG 4 Progress  Ongoing  -GR     STG 4 Progress Comments  90 on 1/2/19  -     STG 5  Patient will be able to bring L leg onto/off of bed and into/out of car without use of B hands.   -     STG 5 Progress  Met  -        Long Term Goals    LTG 1  Patient will be independent and compliant with advanced home exercise program to facilitate self-management of symptoms  -GR     LTG 1 Progress  Progressing  -GR     LTG 2  Patient will ambulate with least restrictive assistive device with near normal gait on level ground short community distances  -     LTG 2 Progress  Ongoing  -GR     LTG 2 Progress Comments  straight cane intermittently; crouched stance, flexed trunk  -     LTG 3  Patient will demonstrate improved L knee flexion to at least 110 degrees and full knee extension for improved AROM and ease with ADLs.  -     LTG 3 Progress  Ongoing  -GR     LTG 4  Patient will negotiate stairs reciprocally with rail support as needed.  -     LTG 4 Progress  Ongoing  -GR     LTG 5  Patient will demonstrate 4+/5 L knee strength (flexion/extension) for improved function and  ease with return to deficit free ambulation and stair negotiation.  -GR     LTG 5 Progress  Progressing  -GR       User Key  (r) = Recorded By, (t) = Taken By, (c) = Cosigned By    Initials Name Provider Type    Tuan Maldonado, PT Physical Therapist          PT Assessment/Plan     Row Name 01/02/19 1532          PT Assessment    Assessment Comments  Mr. Biggs has attended 15 sessions of skilled PT s/p L TKA 11/8/18.  His AROM today measures -5 to 90.  His gait remains compensated with flexed trunk/crouched stance and inability to complete terminal knee extension. He is highly motivated. He will required continued skilled intervention per his lag in AROM at 8 weeks post op.  Trialed instrument assisted manual techniques on this visit, will assess response and continue if beneficial. Continue at 2x/week x 4 weeks recommended.   -GR        PT Plan    PT Plan Comments  Assess response to propped extension with patient controlling using BP cuff and instrument assisted STM to distal quad and hamstrings.  -GR       User Key  (r) = Recorded By, (t) = Taken By, (c) = Cosigned By    Initials Name Provider Type    Tuan Maldonado, PT Physical Therapist          Modalities     Row Name 01/02/19 1100             Ice    Ice Applied  Yes  -GR      Location  L knee over 2 pillows  -GR      Rx Minutes  10 mins  -GR      Ice S/P Rx  Yes  -GR        User Key  (r) = Recorded By, (t) = Taken By, (c) = Cosigned By    Initials Name Provider Type    Tuan Maldonado, PT Physical Therapist        Exercises     Row Name 01/02/19 1200 01/02/19 1100          Subjective Comments    Subjective Comments  --  -GR  Working hard to be dimissed by Dr. Guerin in a month. Declining a manipulation as long as able.  -GR        Subjective Pain    Able to rate subjective pain?  --  -GR  yes  -GR     Pre-Treatment Pain Level  --  -GR  5  -GR        Total Minutes    90662 - PT Therapeutic Exercise Minutes  --  25  -GR     60387 - PT Manual  Therapy Minutes  --  20  -GR        Exercise 2    Exercise Name 2  --  calf raise  -GR     Cueing 2  --  Verbal  -GR     Reps 2  --  20  -GR     Additional Comments  --  blue foam  -GR        Exercise 4    Exercise Name 4  --  mini squat  -GR     Cueing 4  --  Verbal;Demo  -GR     Reps 4  --  15  -GR        Exercise 5    Exercise Name 5  --  --  -GR     Reps 5  --  --  -GR     Additional Comments  --  --  -GR        Exercise 6    Exercise Name 6  --  Nustep, UE/LE  -GR     Time 6  --  5  -GR     Additional Comments  --  seat 11  -GR        Exercise 7    Exercise Name 7  --  --  -GR     Cueing 7  --  --  -GR     Time 7  --  --  -GR     Additional Comments  --  --  -GR        Exercise 8    Exercise Name 8  --  --  -GR     Cueing 8  --  --  -GR     Reps 8  --  --  -GR     Additional Comments  --  --  -GR        Exercise 9    Exercise Name 9  --  stair stretch: HS and Quad  -GR     Reps 9  --  3  -GR     Time 9  --  30  -GR     Additional Comments  --  flex/ext  -GR        Exercise 10    Exercise Name 10  --  Passive knee extension - propped with BP cuff over quad  -GR     Time 10  --  2 minutes  -GR        Exercise 12    Exercise Name 12  --  Recumbent bike  -GR     Time 12  --  5 min  -GR     Additional Comments  --  seat 8  -GR        Exercise 13    Exercise Name 13  --  LAQ   -GR     Cueing 13  --  Verbal;Demo  -GR     Sets 13  --  2  -GR     Reps 13  --  10  -GR     Additional Comments  --  5#  -GR        Exercise 14    Exercise Name 14  --  leg press, LLE only   -GR     Cueing 14  --  Verbal;Demo  -GR     Reps 14  --  40  -GR     Additional Comments  --  75#  -GR        Exercise 15    Exercise Name 15  --  seated, knee flexion stretch, (seated EOB, GTB around L ankle) MH on L quad  -GR     Cueing 15  --  Verbal  -GR     Sets 15  --  3  -GR     Reps 15  --  2 min  -GR       User Key  (r) = Recorded By, (t) = Taken By, (c) = Cosigned By    Initials Name Provider Type    GR Tuan Rueda, PT Physical Therapist            Manual Rx (last 36 hours)      Manual Treatments     Row Name 01/02/19 1100             Total Minutes    29011 - PT Manual Therapy Minutes  20  -GR         Manual Rx 1    Manual Rx 1 Location  L quad/HS tissues,  -GR      Manual Rx 1 Type  IASTM with gua shua tools in supine then prone  -GR         Manual Rx 2    Manual Rx 2 Location  passive L knee flexion in prone intermittently during IASTM  -GR        User Key  (r) = Recorded By, (t) = Taken By, (c) = Cosigned By    Initials Name Provider Type    Tuan Maldonado, PT Physical Therapist                      Outcome Measure Options: Knee Outcome Score- ADL  Knee Outcome Score  Knee Outcome Score Comments: 28/80= 35%      Time Calculation:     Therapy Suggested Charges     Code   Minutes Charges    66457 (CPT®) Hc Pt Neuromusc Re Education Ea 15 Min      25935 (CPT®) Hc Pt Ther Proc Ea 15 Min 25 2    77242 (CPT®) Hc Gait Training Ea 15 Min      85449 (CPT®) Hc Pt Therapeutic Act Ea 15 Min      83472 (CPT®) Hc Pt Manual Therapy Ea 15 Min 20 1    29449 (CPT®) Hc Pt Ther Massage- Per 15 Min      20836 (CPT®) Hc Pt Iontophoresis Ea 15 Min      64050 (CPT®) Hc Pt Elec Stim Ea-Per 15 Min      55278 (CPT®) Hc Pt Ultrasound Ea 15 Min      54660 (CPT®) Hc Pt Self Care/Mgmt/Train Ea 15 Min      81885 (CPT®) Hc Pt Prosthetic (S) Train Initial Encounter, Each 15 Min      26332 (CPT®) Hc Orthotic(S) Mgmt/Train Initial Encounter, Each 15min      25383 (CPT®) Hc Pt Aquatic Therapy Ea 15 Min      75489 (CPT®) Hc Pt Orthotic(S)/Prosthetic(S) Encounter, Each 15 Min       (CPT®) Hc Pt Electrical Stim Unattended      Total  45 3          Start Time: 1145  Stop Time: 1240  Time Calculation (min): 55 min  Total Timed Code Minutes- PT: 45 minute(s)     Therapy Charges for Today     Code Description Service Date Service Provider Modifiers Qty    26733080797 HC PT THER PROC EA 15 MIN 1/2/2019 Tuan Rueda, PT GP 2    07810167574 HC PT MANUAL THERAPY EA 15 MIN 1/2/2019  Tuan Rueda, PT GP 1    17976833407 HC PT HOT OR COLD PACK TREAT MCARE 1/2/2019 Tuan Rueda, PT GP 1          PT G-Codes  Outcome Measure Options: Knee Outcome Score- ADL         Tuan Rueda, PT  1/2/2019

## 2019-01-04 ENCOUNTER — HOSPITAL ENCOUNTER (OUTPATIENT)
Dept: PHYSICAL THERAPY | Facility: HOSPITAL | Age: 64
Setting detail: THERAPIES SERIES
Discharge: HOME | End: 2019-01-04

## 2019-01-04 DIAGNOSIS — Z47.89 ORTHOPEDIC AFTERCARE: Primary | ICD-10-CM

## 2019-01-04 DIAGNOSIS — M25.562 CHRONIC PAIN OF LEFT KNEE: ICD-10-CM

## 2019-01-04 DIAGNOSIS — Z78.9 IMPAIRED MOBILITY AND ACTIVITIES OF DAILY LIVING: ICD-10-CM

## 2019-01-04 DIAGNOSIS — G89.29 CHRONIC PAIN OF LEFT KNEE: ICD-10-CM

## 2019-01-04 DIAGNOSIS — Z96.652 STATUS POST LEFT KNEE REPLACEMENT: ICD-10-CM

## 2019-01-04 DIAGNOSIS — Z74.09 IMPAIRED MOBILITY AND ACTIVITIES OF DAILY LIVING: ICD-10-CM

## 2019-01-04 PROCEDURE — 97140 MANUAL THERAPY 1/> REGIONS: CPT | Performed by: PHYSICAL THERAPIST

## 2019-01-04 PROCEDURE — 97110 THERAPEUTIC EXERCISES: CPT | Performed by: PHYSICAL THERAPIST

## 2019-01-04 NOTE — THERAPY TREATMENT NOTE
Outpatient Physical Therapy Ortho Treatment Note  [facility]     Patient Name: Bob Biggs Sr.  : 1955  MRN: [id number]  Today's Date: 2019      Visit Date: 2019    Visit Dx:    ICD-10-CM ICD-9-CM   1. Orthopedic aftercare Z47.89 V54.9   2. Impaired mobility and activities of daily living Z74.09 799.89   3. Status post left knee replacement Z96.652 V43.65   4. Chronic pain of left knee M25.562 719.46    G89.29 338.29       Patient Active Problem List   Diagnosis   • Osteoarthritis of right knee   • Status post total right knee replacement   • Diabetes (CMS/HCC)   • Arthritis   • Arthritis   • GERD (gastroesophageal reflux disease)   • Chronic pain of left knee   • Osteoarthritis of left knee   • Decreased mobility        Past Medical History:   Diagnosis Date   • Arthritis    • Diabetes mellitus (CMS/HCC)    • Finger fracture, left 2018   • GERD (gastroesophageal reflux disease)    • Painful swelling of joint    • Tear of meniscus of knee         Past Surgical History:   Procedure Laterality Date   • COLONOSCOPY     • FINGER/THUMB CLOSED REDUCTION AND PERCUTANEOUS PINNING Left 2018    Procedure: CLOSED REDUCTION AND LT. SMALL PERCUTANEOUS FINGER PINNING;  Surgeon: Raghu Porras MD;  Location: [facility];  Service: Hand   • HYDROCELECTOMY      late    • KNEE ARTHROSCOPY Left 2011   • KNEE SURGERY  2016    Right TKA   • TOTAL KNEE ARTHROPLASTY Left 2018    Procedure: TOTAL KNEE ARTHROPLASTY;  Surgeon: Nato Guerin MD;  Location: [facility];  Service: Orthopedics       PT Ortho     Row Name 19 1100       Left Lower Ext    Lt Knee Extension/Flexion PROM  93 flexion, PROM, prone  -GJ    Row Name 19 1200       Left Lower Ext    Lt Knee Extension/Flexion AROM  -5/90  -GR    Lt Knee Extension/Flexion PROM  0/94  -GR       MMT Left Lower Ext    Lt Knee Extension MMT, Gross Movement  (4-/5) good minus within available range  -GR    Lt  Knee Flexion MMT, Gross Movement  (4-/5) good minus within available range  -GR      User Key  (r) = Recorded By, (t) = Taken By, (c) = Cosigned By    Initials Name Provider Type    Felix Roy, PT Physical Therapist    GR Tuan Rueda, PT Physical Therapist                      PT Assessment/Plan     Row Name 01/04/19 1507          PT Assessment    Assessment Comments  Mr. Biggs reports some improvement in his condition since previous session.  Today, we held on strengthening exercises and focused on ROM.  He was able to make full revolutions on the bike both forward/backward (with assistance from therapist) at seat 9.  He demonstrated R knee PROM flexion 93 in prone.  He is progressing with ROM and progressing toward functional goals.    -GJ        PT Plan    PT Plan Comments  warm up on Nustep, continue bike at seat 9, progress to 8 if able to make revolution.  Main focus on ROM.  -GJ       User Key  (r) = Recorded By, (t) = Taken By, (c) = Cosigned By    Initials Name Provider Type    Felix Roy, PT Physical Therapist          Modalities     Row Name 01/04/19 1100             Ice    Ice Applied  Yes  -GJ      Location  L knee, pt. supine, L knee flexed over bolster  -GJ      Rx Minutes  10 mins  -GJ      Ice S/P Rx  Yes  -GJ        User Key  (r) = Recorded By, (t) = Taken By, (c) = Cosigned By    Initials Name Provider Type    Felix Roy, PT Physical Therapist          Exercises     Row Name 01/04/19 1134 01/04/19 1100          Subjective Comments    Subjective Comments  --  I think what we did last time helped  -GJ        Subjective Pain    Pre-Treatment Pain Level  --  5  -GJ        Total Minutes    35901 - PT Therapeutic Exercise Minutes  26  -GJ  --     03438 - PT Manual Therapy Minutes  17  -GJ  --        Exercise 2    Exercise Name 2  --  calf raise  -GJ     Cueing 2  --  Verbal  -GJ     Reps 2  --  20  -GJ     Additional Comments  --  blue foam  -GJ        Exercise 4     Exercise Name 4  --  mini squat  -GJ     Cueing 4  --  Verbal;Demo  -GJ     Reps 4  --  20  -GJ        Exercise 6    Exercise Name 6  --  Nustep, UE/LE  -GJ     Time 6  --  5  -GJ     Additional Comments  --  seat 11  -GJ        Exercise 10    Exercise Name 10  --  Passive knee extension - propped with BP cuff over quad  -GJ     Time 10  --  2 minutes  -GJ        Exercise 12    Exercise Name 12  --  Recumbent bike  -GJ     Cueing 12  --  Verbal;Demo  -GJ     Time 12  --  5 min  -GJ     Additional Comments  --  seat 9, therapist assist, forward/backward  -GJ       User Key  (r) = Recorded By, (t) = Taken By, (c) = Cosigned By    Initials Name Provider Type    Felix Roy, PT Physical Therapist                        Manual Rx (last 36 hours)      Manual Treatments     Row Name 01/04/19 1100             Manual Rx 1    Manual Rx 1 Location  L quad/HS tissues,  -GJ      Manual Rx 1 Type  IASTM with gua shua tools in supine then prone  -GJ         Manual Rx 2    Manual Rx 2 Location  passive L knee flexion in prone intermittently during IASTM  -GJ        User Key  (r) = Recorded By, (t) = Taken By, (c) = Cosigned By    Initials Name Provider Type    Felix Roy, PT Physical Therapist          PT OP Goals     Row Name 01/04/19 1100          PT Short Term Goals    STG 1  Patient will be independent and compliant with initial home exercise program  -GJ     STG 1 Progress  Met  -GJ     STG 2  Patient will be independent with patellar and scar tissue mobilization techniques.  -GJ     STG 2 Progress  Met  -GJ     STG 3  Patient will be independent with education for symptom management, joint protection and strategies to minimize stress on affected tissues  -GJ     STG 3 Progress  Met  -GJ     STG 4  Patient will demonstrate L knee active flexion to >/= 95 degrees  -GJ     STG 4 Progress  Ongoing  -GJ     STG 4 Progress Comments  PROM R knee flexion 93  -GJ     STG 5  Patient will be able to bring L leg onto/off  of bed and into/out of car without use of B hands.   -GJ     STG 5 Progress  Met  -GJ        Long Term Goals    LTG 1  Patient will be independent and compliant with advanced home exercise program to facilitate self-management of symptoms  -GJ     LTG 1 Progress  Progressing  -GJ     LTG 2  Patient will ambulate with least restrictive assistive device with near normal gait on level ground short community distances  -GJ     LTG 2 Progress  Ongoing  -GJ     LTG 3  Patient will demonstrate improved L knee flexion to at least 110 degrees and full knee extension for improved AROM and ease with ADLs.  -GJ     LTG 3 Progress  Ongoing  -GJ     LTG 4  Patient will negotiate stairs reciprocally with rail support as needed.  -GJ     LTG 4 Progress  Ongoing  -GJ     LTG 5  Patient will demonstrate 4+/5 L knee strength (flexion/extension) for improved function and ease with return to deficit free ambulation and stair negotiation.  -GJ     LTG 5 Progress  Progressing  -GJ       User Key  (r) = Recorded By, (t) = Taken By, (c) = Cosigned By    Initials Name Provider Type    Felix Roy, PT Physical Therapist          Therapy Education  Given: HEP, Symptoms/condition management, Pain management, Mobility training, Posture/body mechanics  Program: Reinforced  How Provided: Verbal  Provided to: Patient  Level of Understanding: Verbalized              Time Calculation:   Start Time: 1134  Stop Time: 1229  Time Calculation (min): 55 min  Therapy Suggested Charges     Code   Minutes Charges    99007 (CPT®) Hc Pt Neuromusc Re Education Ea 15 Min      85859 (CPT®) Hc Pt Ther Proc Ea 15 Min 26 2    20405 (CPT®) Hc Gait Training Ea 15 Min      09890 (CPT®) Hc Pt Therapeutic Act Ea 15 Min      53837 (CPT®) Hc Pt Manual Therapy Ea 15 Min 17 1    48227 (CPT®) Hc Pt Ther Massage- Per 15 Min      76992 (CPT®) Hc Pt Iontophoresis Ea 15 Min      67575 (CPT®) Hc Pt Elec Stim Ea-Per 15 Min      30728 (CPT®) Hc Pt Ultrasound Ea 15 Min       33231 (CPT®) Hc Pt Self Care/Mgmt/Train Ea 15 Min      33333 (CPT®) Hc Pt Prosthetic (S) Train Initial Encounter, Each 15 Min      15066 (CPT®) Hc Orthotic(S) Mgmt/Train Initial Encounter, Each 15min      36958 (CPT®) Hc Pt Aquatic Therapy Ea 15 Min      96454 (CPT®) Hc Pt Orthotic(S)/Prosthetic(S) Encounter, Each 15 Min       (CPT®) Hc Pt Electrical Stim Unattended      Total  43 3        Therapy Charges for Today     Code Description Service Date Service Provider Modifiers Qty    46182245063 HC PT THER PROC EA 15 MIN 1/4/2019 Felix Villa, PT GP 2    25115528810 HC PT MANUAL THERAPY EA 15 MIN 1/4/2019 Felix Villa, PT GP 1    37774175744 HC PT HOT OR COLD PACK TREAT MCARE 1/4/2019 Felix Villa, PT GP 1                    Felix Villa, PT  1/4/2019

## 2019-01-07 ENCOUNTER — HOSPITAL ENCOUNTER (OUTPATIENT)
Dept: PHYSICAL THERAPY | Facility: HOSPITAL | Age: 64
Setting detail: THERAPIES SERIES
Discharge: HOME | End: 2019-01-07

## 2019-01-07 DIAGNOSIS — Z78.9 IMPAIRED MOBILITY AND ACTIVITIES OF DAILY LIVING: ICD-10-CM

## 2019-01-07 DIAGNOSIS — Z96.652 STATUS POST LEFT KNEE REPLACEMENT: ICD-10-CM

## 2019-01-07 DIAGNOSIS — M25.562 CHRONIC PAIN OF LEFT KNEE: ICD-10-CM

## 2019-01-07 DIAGNOSIS — G89.29 CHRONIC PAIN OF LEFT KNEE: ICD-10-CM

## 2019-01-07 DIAGNOSIS — Z47.89 ORTHOPEDIC AFTERCARE: Primary | ICD-10-CM

## 2019-01-07 DIAGNOSIS — Z74.09 IMPAIRED MOBILITY AND ACTIVITIES OF DAILY LIVING: ICD-10-CM

## 2019-01-07 PROCEDURE — 97140 MANUAL THERAPY 1/> REGIONS: CPT | Performed by: PHYSICAL THERAPIST

## 2019-01-07 PROCEDURE — 97110 THERAPEUTIC EXERCISES: CPT | Performed by: PHYSICAL THERAPIST

## 2019-01-07 RX ORDER — OXYCODONE AND ACETAMINOPHEN 7.5; 325 MG/1; MG/1
1 TABLET ORAL EVERY 4 HOURS PRN
Qty: 60 TABLET | Refills: 0 | Status: SHIPPED | OUTPATIENT
Start: 2019-01-07 | End: 2019-01-18 | Stop reason: SDUPTHER

## 2019-01-07 NOTE — THERAPY TREATMENT NOTE
Outpatient Physical Therapy Ortho Treatment Note  [facility]     Patient Name: Bob Biggs Sr.  : 1955  MRN: [id number]  Today's Date: 2019      Visit Date: 2019    Visit Dx:    ICD-10-CM ICD-9-CM   1. Orthopedic aftercare Z47.89 V54.9   2. Impaired mobility and activities of daily living Z74.09 799.89   3. Status post left knee replacement Z96.652 V43.65   4. Chronic pain of left knee M25.562 719.46    G89.29 338.29       Patient Active Problem List   Diagnosis   • Osteoarthritis of right knee   • Status post total right knee replacement   • Diabetes (CMS/HCC)   • Arthritis   • Arthritis   • GERD (gastroesophageal reflux disease)   • Chronic pain of left knee   • Osteoarthritis of left knee   • Decreased mobility        Past Medical History:   Diagnosis Date   • Arthritis    • Diabetes mellitus (CMS/HCC)    • Finger fracture, left 2018   • GERD (gastroesophageal reflux disease)    • Painful swelling of joint    • Tear of meniscus of knee         Past Surgical History:   Procedure Laterality Date   • COLONOSCOPY     • FINGER/THUMB CLOSED REDUCTION AND PERCUTANEOUS PINNING Left 2018    Procedure: CLOSED REDUCTION AND LT. SMALL PERCUTANEOUS FINGER PINNING;  Surgeon: Raghu Porras MD;  Location: [facility];  Service: Hand   • HYDROCELECTOMY      late    • KNEE ARTHROSCOPY Left 2011   • KNEE SURGERY  2016    Right TKA   • TOTAL KNEE ARTHROPLASTY Left 2018    Procedure: TOTAL KNEE ARTHROPLASTY;  Surgeon: Nato Guerin MD;  Location: [facility];  Service: Orthopedics                       PT Assessment/Plan     Row Name 19 0952          PT Assessment    Assessment Comments  AROM seated left knee flexion 96 degrees  -SC        PT Plan    PT Plan Comments  continue to focus on range of motion  -SC       User Key  (r) = Recorded By, (t) = Taken By, (c) = Cosigned By    Initials Name Provider Type    Peggy Bill, PT Physical Therapist           Modalities     Row Name 01/07/19 1052             Ice    Ice Applied  Yes  -SC      Location  L knee, pt. supine, L knee flexed over bolster  -SC      Rx Minutes  10 mins  -SC      Ice S/P Rx  Yes  -SC        User Key  (r) = Recorded By, (t) = Taken By, (c) = Cosigned By    Initials Name Provider Type    Peggy Bill, PT Physical Therapist          Exercises     Row Name 01/07/19 1052             Subjective Comments    Subjective Comments  I think the scrapping massage really helped last time I was here.   -SC         Subjective Pain    Able to rate subjective pain?  yes  -SC      Pre-Treatment Pain Level  5  -SC         Exercise 2    Exercise Name 2  --  -SC      Cueing 2  --  -SC      Reps 2  --  -SC         Exercise 4    Exercise Name 4  mini squat  -SC      Cueing 4  Verbal;Demo  -SC      Reps 4  20  -SC         Exercise 6    Exercise Name 6  Nustep, LE  -SC      Time 6  5  -SC      Additional Comments  seat 11/L5  -SC         Exercise 10    Exercise Name 10  Passive knee extension - propped with BP cuff over quad  -SC      Time 10  2 minutes  -SC         Exercise 12    Exercise Name 12  Recumbent bike  -SC      Cueing 12  Verbal;Demo  -SC      Time 12  5 min  -SC      Additional Comments  seat 9-8, no assistance required, no assistance required for full revolutions  -SC        User Key  (r) = Recorded By, (t) = Taken By, (c) = Cosigned By    Initials Name Provider Type    Peggy Bill, PT Physical Therapist                        Manual Rx (last 36 hours)      Manual Treatments     Row Name 01/07/19 1052             Manual Rx 1    Manual Rx 1 Location  L quad/HS tissues,  -SC      Manual Rx 1 Type  IASTM with gua sakinaua tools in supine then prone  -SC         Manual Rx 2    Manual Rx 2 Location  passive L knee flexion in prone intermittently during IASTM  -SC        User Key  (r) = Recorded By, (t) = Taken By, (c) = Cosigned By    Initials Name Provider Type    Peggy Bill,  PT Physical Therapist              Therapy Education  Education Details: scar massage/management  Given: HEP, Symptoms/condition management, Pain management, Mobility training, Posture/body mechanics  Program: Reinforced  How Provided: Verbal  Provided to: Patient  Level of Understanding: Teach back education performed              Time Calculation:   Start Time: 1052  Stop Time: 1145  Time Calculation (min): 53 min  Therapy Suggested Charges     Code   Minutes Charges    None           Therapy Charges for Today     Code Description Service Date Service Provider Modifiers Qty    50532495034 HC PT THER PROC EA 15 MIN 1/7/2019 Peggy Engel, PT GP 1    67454303803 HC PT MANUAL THERAPY EA 15 MIN 1/7/2019 Peggy Engel, PT GP 1    45623020353 HC PT HOT OR COLD PACK TREAT [facility]ARE 1/7/2019 Peggy Engel, PT GP 1                    Peggy Whitmore, PT  1/7/2019

## 2019-01-09 ENCOUNTER — HOSPITAL ENCOUNTER (OUTPATIENT)
Dept: PHYSICAL THERAPY | Facility: HOSPITAL | Age: 64
Setting detail: THERAPIES SERIES
Discharge: HOME | End: 2019-01-09

## 2019-01-09 DIAGNOSIS — Z78.9 IMPAIRED MOBILITY AND ACTIVITIES OF DAILY LIVING: ICD-10-CM

## 2019-01-09 DIAGNOSIS — Z96.652 STATUS POST LEFT KNEE REPLACEMENT: ICD-10-CM

## 2019-01-09 DIAGNOSIS — M25.562 CHRONIC PAIN OF LEFT KNEE: ICD-10-CM

## 2019-01-09 DIAGNOSIS — G89.29 CHRONIC PAIN OF LEFT KNEE: ICD-10-CM

## 2019-01-09 DIAGNOSIS — Z47.89 ORTHOPEDIC AFTERCARE: Primary | ICD-10-CM

## 2019-01-09 DIAGNOSIS — Z74.09 IMPAIRED MOBILITY AND ACTIVITIES OF DAILY LIVING: ICD-10-CM

## 2019-01-09 PROCEDURE — 97110 THERAPEUTIC EXERCISES: CPT | Performed by: PHYSICAL THERAPIST

## 2019-01-09 NOTE — THERAPY TREATMENT NOTE
Outpatient Physical Therapy Ortho Treatment Note  [facility]     Patient Name: Bob Biggs Sr.  : 1955  MRN: [id number]  Today's Date: 2019      Visit Date: 2019    Visit Dx:    ICD-10-CM ICD-9-CM   1. Orthopedic aftercare Z47.89 V54.9   2. Impaired mobility and activities of daily living Z74.09 799.89   3. Chronic pain of left knee M25.562 719.46    G89.29 338.29   4. Status post left knee replacement Z96.652 V43.65       Patient Active Problem List   Diagnosis   • Osteoarthritis of right knee   • Status post total right knee replacement   • Diabetes (CMS/HCC)   • Arthritis   • Arthritis   • GERD (gastroesophageal reflux disease)   • Chronic pain of left knee   • Osteoarthritis of left knee   • Decreased mobility        Past Medical History:   Diagnosis Date   • Arthritis    • Diabetes mellitus (CMS/HCC)    • Finger fracture, left 2018   • GERD (gastroesophageal reflux disease)    • Painful swelling of joint    • Tear of meniscus of knee         Past Surgical History:   Procedure Laterality Date   • COLONOSCOPY     • FINGER/THUMB CLOSED REDUCTION AND PERCUTANEOUS PINNING Left 2018    Procedure: CLOSED REDUCTION AND LT. SMALL PERCUTANEOUS FINGER PINNING;  Surgeon: Raghu Porras MD;  Location: [facility];  Service: Hand   • HYDROCELECTOMY      late    • KNEE ARTHROSCOPY Left 2011   • KNEE SURGERY  2016    Right TKA   • TOTAL KNEE ARTHROPLASTY Left 2018    Procedure: TOTAL KNEE ARTHROPLASTY;  Surgeon: Nato Guerin MD;  Location: [facility];  Service: Orthopedics                       PT Assessment/Plan     Row Name 19 1220          PT Assessment    Assessment Comments  Mr. Biggs returns today after several sessions focusing only on manual/ROM.  He reports increased stiffness and subsequent heat in his knee. We held on manual work today, resumed strengthening.  Apparently pt. has been performing strengthening BID.  We are going to have  him hold on strengthening all together and only perform ROM at home for now.  He is highly motivated and is progressing toward goals.   -GJ        PT Plan    PT Plan Comments  assess response to holding strengthening at home.  consider return to focus on ROM and manual  -GJ       User Key  (r) = Recorded By, (t) = Taken By, (c) = Cosigned By    Initials Name Provider Type    Felix Roy, PT Physical Therapist          Modalities     Row Name 01/09/19 1100             Ice    Ice Applied  Yes  -GJ      Location  L knee pt. supine with LLE elevated over 2 pillows  -GJ      Rx Minutes  10 mins  -GJ      Ice S/P Rx  Yes  -GJ        User Key  (r) = Recorded By, (t) = Taken By, (c) = Cosigned By    Initials Name Provider Type    Felix Roy, PT Physical Therapist          Exercises     Row Name 01/09/19 1136 01/09/19 1100          Subjective Comments    Subjective Comments  --  My knee feels stiffer today, it is hot  -GJ        Subjective Pain    Pre-Treatment Pain Level  --  2  -GJ        Total Minutes    09362 - PT Therapeutic Exercise Minutes  40  -GJ  --        Exercise 2    Exercise Name 2  --  calf raise  -GJ     Cueing 2  --  Verbal  -GJ     Reps 2  --  20  -GJ        Exercise 3    Exercise Name 3  --  wall slide  -GJ     Cueing 3  --  Verbal;Demo  -GJ     Reps 3  --  10  -GJ     Time 3  --  10 s  -GJ     Additional Comments  --  5#, therapist assist to return to neutral  -GJ        Exercise 4    Exercise Name 4  --  mini squat  -GJ     Cueing 4  --  Verbal;Demo  -GJ     Reps 4  --  20  -GJ        Exercise 5    Exercise Name 5  --  TKE  -GJ     Cueing 5  --  Verbal;Demo  -GJ     Reps 5  --  15  -GJ     Time 5  --  5 s  -GJ     Additional Comments  --  honey  -GJ        Exercise 6    Exercise Name 6  --  Nustep, LE  -GJ     Time 6  --  5  -GJ     Additional Comments  --  seat 11/L5  -GJ        Exercise 7    Exercise Name 7  --  standing hip abd, bilateral   -GJ     Cueing 7  --  Verbal;Demo  -GJ      Reps 7  --  2  -GJ     Time 7  --  10  -GJ     Additional Comments  --  5#  -GJ        Exercise 8    Exercise Name 8  --  standing HS curl L   -GJ     Cueing 8  --  Verbal;Demo  -GJ     Reps 8  --  20  -GJ     Additional Comments  --  5#  -GJ        Exercise 9    Exercise Name 9  --  stair stretch: HS and Quad  -GJ     Cueing 9  --  Verbal;Demo  -GJ     Reps 9  --  3  -GJ     Time 9  --  20 s  -GJ     Additional Comments  --  each  -GJ        Exercise 12    Exercise Name 12  --  Recumbent bike  -GJ     Cueing 12  --  Verbal;Demo  -GJ     Time 12  --  5 min  -GJ     Additional Comments  --  seat 9  -GJ        Exercise 13    Exercise Name 13  --  LAQ   -GJ     Cueing 13  --  Verbal;Demo  -GJ     Sets 13  --  2  -GJ     Reps 13  --  10  -GJ     Additional Comments  --  5#  -GJ        Exercise 14    Exercise Name 14  --  leg press, LLE only   -GJ     Cueing 14  --  Verbal;Demo  -GJ     Sets 14  --  2  -GJ     Reps 14  --  20  -GJ     Additional Comments  --  75#  -GJ       User Key  (r) = Recorded By, (t) = Taken By, (c) = Cosigned By    Initials Name Provider Type    GJ Felix Villa, PT Physical Therapist                         PT OP Goals     Row Name 01/09/19 1100          PT Short Term Goals    STG 1  Patient will be independent and compliant with initial home exercise program  -GJ     STG 1 Progress  Met  -GJ     STG 2  Patient will be independent with patellar and scar tissue mobilization techniques.  -GJ     STG 2 Progress  Met  -GJ     STG 3  Patient will be independent with education for symptom management, joint protection and strategies to minimize stress on affected tissues  -GJ     STG 3 Progress  Met  -GJ     STG 4  Patient will demonstrate L knee active flexion to >/= 95 degrees  -GJ     STG 4 Progress  Ongoing  -GJ     STG 5  Patient will be able to bring L leg onto/off of bed and into/out of car without use of B hands.   -GJ     STG 5 Progress  Met  -GJ        Long Term Goals    LTG 1  Patient will  be independent and compliant with advanced home exercise program to facilitate self-management of symptoms  -GJ     LTG 1 Progress  Progressing  -GJ     LTG 2  Patient will ambulate with least restrictive assistive device with near normal gait on level ground short community distances  -GJ     LTG 2 Progress  Ongoing  -GJ     LTG 3  Patient will demonstrate improved L knee flexion to at least 110 degrees and full knee extension for improved AROM and ease with ADLs.  -GJ     LTG 3 Progress  Ongoing  -GJ     LTG 4  Patient will negotiate stairs reciprocally with rail support as needed.  -GJ     LTG 4 Progress  Ongoing  -GJ     LTG 5  Patient will demonstrate 4+/5 L knee strength (flexion/extension) for improved function and ease with return to deficit free ambulation and stair negotiation.  -GJ     LTG 5 Progress  Progressing  -GJ       User Key  (r) = Recorded By, (t) = Taken By, (c) = Cosigned By    Initials Name Provider Type    Felix Roy, PT Physical Therapist          Therapy Education  Education Details: pt. to only perform stretches at home now  Given: HEP, Symptoms/condition management, Pain management, Posture/body mechanics, Mobility training, Edema management  Program: Reinforced  How Provided: Verbal  Provided to: Patient  Level of Understanding: Teach back education performed, Demonstrated, Verbalized              Time Calculation:   Start Time: 1132  Stop Time: 1222  Time Calculation (min): 50 min  Therapy Suggested Charges     Code   Minutes Charges    81309 (CPT®) Hc Pt Neuromusc Re Education Ea 15 Min      84060 (CPT®) Hc Pt Ther Proc Ea 15 Min 40 3    00711 (CPT®) Hc Gait Training Ea 15 Min      28009 (CPT®) Hc Pt Therapeutic Act Ea 15 Min      38332 (CPT®) Hc Pt Manual Therapy Ea 15 Min      45884 (CPT®) Hc Pt Ther Massage- Per 15 Min      53112 (CPT®) Hc Pt Iontophoresis Ea 15 Min      55924 (CPT®) Hc Pt Elec Stim Ea-Per 15 Min      31317 (CPT®) Hc Pt Ultrasound Ea 15 Min      96496 (CPT®)  Hc Pt Self Care/Mgmt/Train Ea 15 Min      60339 (CPT®) Hc Pt Prosthetic (S) Train Initial Encounter, Each 15 Min      82858 (CPT®) Hc Orthotic(S) Mgmt/Train Initial Encounter, Each 15min      36843 (CPT®) Hc Pt Aquatic Therapy Ea 15 Min      54265 (CPT®) Hc Pt Orthotic(S)/Prosthetic(S) Encounter, Each 15 Min       (CPT®) Hc Pt Electrical Stim Unattended      Total  40 3        Therapy Charges for Today     Code Description Service Date Service Provider Modifiers Qty    24210372671 HC PT THER PROC EA 15 MIN 1/9/2019 Felix Villa, PT GP 3    61105126924 HC PT HOT OR COLD PACK TREAT [facility] 1/9/2019 Felix Villa, PT GP 1                    Felix Villa, PT  1/9/2019

## 2019-01-11 ENCOUNTER — HOSPITAL ENCOUNTER (OUTPATIENT)
Dept: PHYSICAL THERAPY | Facility: HOSPITAL | Age: 64
Setting detail: THERAPIES SERIES
Discharge: HOME | End: 2019-01-11

## 2019-01-11 DIAGNOSIS — M25.562 CHRONIC PAIN OF LEFT KNEE: ICD-10-CM

## 2019-01-11 DIAGNOSIS — Z74.09 IMPAIRED MOBILITY AND ACTIVITIES OF DAILY LIVING: ICD-10-CM

## 2019-01-11 DIAGNOSIS — G89.29 CHRONIC PAIN OF LEFT KNEE: ICD-10-CM

## 2019-01-11 DIAGNOSIS — Z96.652 STATUS POST LEFT KNEE REPLACEMENT: ICD-10-CM

## 2019-01-11 DIAGNOSIS — Z78.9 IMPAIRED MOBILITY AND ACTIVITIES OF DAILY LIVING: ICD-10-CM

## 2019-01-11 DIAGNOSIS — Z47.89 ORTHOPEDIC AFTERCARE: Primary | ICD-10-CM

## 2019-01-11 PROCEDURE — 97110 THERAPEUTIC EXERCISES: CPT

## 2019-01-11 NOTE — THERAPY TREATMENT NOTE
Outpatient Physical Therapy Ortho Treatment Note  [facility]     Patient Name: Bob Biggs Sr.  : 1955  MRN: [id number]  Today's Date: 2019      Visit Date: 2019    Visit Dx:    ICD-10-CM ICD-9-CM   1. Orthopedic aftercare Z47.89 V54.9   2. Impaired mobility and activities of daily living Z74.09 799.89   3. Chronic pain of left knee M25.562 719.46    G89.29 338.29   4. Status post left knee replacement Z96.652 V43.65       Patient Active Problem List   Diagnosis   • Osteoarthritis of right knee   • Status post total right knee replacement   • Diabetes (CMS/HCC)   • Arthritis   • Arthritis   • GERD (gastroesophageal reflux disease)   • Chronic pain of left knee   • Osteoarthritis of left knee   • Decreased mobility        Past Medical History:   Diagnosis Date   • Arthritis    • Diabetes mellitus (CMS/HCC)    • Finger fracture, left 2018   • GERD (gastroesophageal reflux disease)    • Painful swelling of joint    • Tear of meniscus of knee         Past Surgical History:   Procedure Laterality Date   • COLONOSCOPY     • FINGER/THUMB CLOSED REDUCTION AND PERCUTANEOUS PINNING Left 2018    Procedure: CLOSED REDUCTION AND LT. SMALL PERCUTANEOUS FINGER PINNING;  Surgeon: Raghu Porras MD;  Location: [facility];  Service: Hand   • HYDROCELECTOMY      late    • KNEE ARTHROSCOPY Left 2011   • KNEE SURGERY  2016    Right TKA   • TOTAL KNEE ARTHROPLASTY Left 2018    Procedure: TOTAL KNEE ARTHROPLASTY;  Surgeon: Nato Guerin MD;  Location: [facility];  Service: Orthopedics                       PT Assessment/Plan     Row Name 19 1157          PT Assessment    Assessment Comments  Decreased swlling and het left knee. Continue ROM at home. Passive flex 102. Near passve ful flex after manual. Able to increase weight with LAQ.   -AVINASH       User Key  (r) = Recorded By, (t) = Taken By, (c) = Cosigned By    Initials Name Provider Type    AVINASH Hill  [address]ATRELL Lane Physical Therapy Assistant          Modalities     Row Name 01/11/19 1120             Ice    Ice Applied  Yes  -WS      Location  L knee pt. supine with LLE elevated over 2 pillows  -WS      Rx Minutes  10 mins  -WS      Ice S/P Rx  Yes  -WS        User Key  (r) = Recorded By, (t) = Taken By, (c) = Cosigned By    Initials Name Provider Type    WS Domingo Hill PTA Physical Therapy Assistant          Exercises     Row Name 01/11/19 1120             Subjective Comments    Subjective Comments  Knee has setlled down. Less swelling less heat  -WS         Subjective Pain    Pre-Treatment Pain Level  3  -WS         Total Minutes    18990 - PT Therapeutic Exercise Minutes  40  -WS         Exercise 2    Exercise Name 2  calf raise  -WS      Cueing 2  Verbal  -WS      Reps 2  20  -WS         Exercise 3    Exercise Name 3  wall slide  -WS      Cueing 3  Verbal;Demo  -WS      Reps 3  10  -WS      Time 3  10 s  -WS      Additional Comments  5#  -WS         Exercise 4    Exercise Name 4  mini squat  -WS      Cueing 4  Verbal;Demo  -WS      Reps 4  20  -WS         Exercise 5    Exercise Name 5  TKE  -WS      Cueing 5  Verbal;Demo  -WS      Reps 5  15  -WS      Time 5  5 s  -WS      Additional Comments  HB  -WS         Exercise 6    Exercise Name 6  Nustep, LE  -WS      Time 6  5  -WS      Additional Comments  seat 11/L5  -WS         Exercise 7    Exercise Name 7  standing hip abd, bilateral   -WS      Cueing 7  Verbal;Demo  -WS      Reps 7  2  -WS      Time 7  10  -WS      Additional Comments  5#  -WS         Exercise 8    Exercise Name 8  standing HS curl L   -WS      Cueing 8  Verbal;Demo  -WS      Reps 8  20  -WS      Additional Comments  5#  -WS         Exercise 9    Exercise Name 9  stair stretch: HS and Quad  -WS      Cueing 9  Verbal;Demo  -WS      Reps 9  3  -WS      Time 9  20 s  -WS         Exercise 12    Exercise Name 12  Recumbent bike  -WS      Cueing 12  Verbal;Demo  -WS      Time 12  5 min  -WS       Additional Comments  seat 9-8-7  -WS         Exercise 13    Exercise Name 13  LAQ   -WS      Cueing 13  Verbal;Demo  -WS      Sets 13  2  -WS      Reps 13  10  -WS      Additional Comments  7#  -WS         Exercise 14    Exercise Name 14  leg press, LLE only   -WS      Cueing 14  Verbal;Demo  -WS      Sets 14  2  -WS      Reps 14  20  -WS      Additional Comments  75#  -WS        User Key  (r) = Recorded By, (t) = Taken By, (c) = Cosigned By    Initials Name Provider Type    Domingo Sanchez PTA Physical Therapy Assistant                         PT OP Goals     Row Name 01/11/19 1100          PT Short Term Goals    STG 1  Patient will be independent and compliant with initial home exercise program  -     STG 1 Progress  Met  -     STG 2  Patient will be independent with patellar and scar tissue mobilization techniques.  -     STG 2 Progress  Met  -     STG 3  Patient will be independent with education for symptom management, joint protection and strategies to minimize stress on affected tissues  -WS     STG 3 Progress  Met  -     STG 4  Patient will demonstrate L knee active flexion to >/= 95 degrees  -     STG 4 Progress  Ongoing  -     STG 5  Patient will be able to bring L leg onto/off of bed and into/out of car without use of B hands.   -     STG 5 Progress  Met  -        Long Term Goals    LTG 1  Patient will be independent and compliant with advanced home exercise program to facilitate self-management of symptoms  -     LTG 1 Progress  Progressing  -     LTG 2  Patient will ambulate with least restrictive assistive device with near normal gait on level ground short community distances  -     LTG 2 Progress  Ongoing  -     LTG 3  Patient will demonstrate improved L knee flexion to at least 110 degrees and full knee extension for improved AROM and ease with ADLs.  -     LTG 3 Progress  Ongoing  -     LTG 4  Patient will negotiate stairs reciprocally with rail support as  needed.  -     LTG 4 Progress  Ongoing  -     LTG 5  Patient will demonstrate 4+/5 L knee strength (flexion/extension) for improved function and ease with return to deficit free ambulation and stair negotiation.  -     LTG 5 Progress  Progressing  -       User Key  (r) = Recorded By, (t) = Taken By, (c) = Cosigned By    Initials Name Provider Type    Domingo Sanchez PTA Physical Therapy Assistant          Therapy Education  Given: HEP, Symptoms/condition management, Pain management, Posture/body mechanics, Edema management  Program: Reinforced  How Provided: Verbal  Provided to: Patient              Time Calculation:   Start Time: 1120  Stop Time: 1210  Time Calculation (min): 50 min  Therapy Suggested Charges     Code   Minutes Charges    48837 (CPT®) Hc Pt Neuromusc Re Education Ea 15 Min      47637 (CPT®) Hc Pt Ther Proc Ea 15 Min 40 3    11629 (CPT®) Hc Gait Training Ea 15 Min      04761 (CPT®) Hc Pt Therapeutic Act Ea 15 Min      25735 (CPT®) Hc Pt Manual Therapy Ea 15 Min      29502 (CPT®) Hc Pt Ther Massage- Per 15 Min      23019 (CPT®) Hc Pt Iontophoresis Ea 15 Min      57920 (CPT®) Hc Pt Elec Stim Ea-Per 15 Min      88831 (CPT®) Hc Pt Ultrasound Ea 15 Min      34668 (CPT®) Hc Pt Self Care/Mgmt/Train Ea 15 Min      49875 (CPT®) Hc Pt Prosthetic (S) Train Initial Encounter, Each 15 Min      69115 (CPT®) Hc Orthotic(S) Mgmt/Train Initial Encounter, Each 15min      49517 (CPT®) Hc Pt Aquatic Therapy Ea 15 Min      26621 (CPT®) Hc Pt Orthotic(S)/Prosthetic(S) Encounter, Each 15 Min       (CPT®) Hc Pt Electrical Stim Unattended      Total  40 3        Therapy Charges for Today     Code Description Service Date Service Provider Modifiers Qty    56716076706 HC PT THER PROC EA 15 MIN 1/11/2019 Domingo Hill PTA GP 3    80905567177 HC PT HOT OR COLD PACK TREAT [facility] 1/11/2019 Domingo Hill PTA GP 1                    Domingo Hill PTA  1/11/2019

## 2019-01-14 ENCOUNTER — HOSPITAL ENCOUNTER (OUTPATIENT)
Dept: PHYSICAL THERAPY | Facility: HOSPITAL | Age: 64
Setting detail: THERAPIES SERIES
Discharge: HOME | End: 2019-01-14

## 2019-01-14 DIAGNOSIS — Z96.652 STATUS POST LEFT KNEE REPLACEMENT: ICD-10-CM

## 2019-01-14 DIAGNOSIS — Z74.09 IMPAIRED MOBILITY AND ACTIVITIES OF DAILY LIVING: ICD-10-CM

## 2019-01-14 DIAGNOSIS — G89.29 CHRONIC PAIN OF LEFT KNEE: ICD-10-CM

## 2019-01-14 DIAGNOSIS — M25.562 CHRONIC PAIN OF LEFT KNEE: ICD-10-CM

## 2019-01-14 DIAGNOSIS — Z78.9 IMPAIRED MOBILITY AND ACTIVITIES OF DAILY LIVING: ICD-10-CM

## 2019-01-14 DIAGNOSIS — Z47.89 ORTHOPEDIC AFTERCARE: Primary | ICD-10-CM

## 2019-01-14 PROCEDURE — 97110 THERAPEUTIC EXERCISES: CPT

## 2019-01-14 NOTE — THERAPY TREATMENT NOTE
Outpatient Physical Therapy Ortho Treatment Note  [facility]     Patient Name: Bob Biggs Sr.  : 1955  MRN: [id number]  Today's Date: 2019      Visit Date: 2019    Visit Dx:    ICD-10-CM ICD-9-CM   1. Orthopedic aftercare Z47.89 V54.9   2. Impaired mobility and activities of daily living Z74.09 799.89   3. Chronic pain of left knee M25.562 719.46    G89.29 338.29   4. Status post left knee replacement Z96.652 V43.65       Patient Active Problem List   Diagnosis   • Osteoarthritis of right knee   • Status post total right knee replacement   • Diabetes (CMS/HCC)   • Arthritis   • Arthritis   • GERD (gastroesophageal reflux disease)   • Chronic pain of left knee   • Osteoarthritis of left knee   • Decreased mobility        Past Medical History:   Diagnosis Date   • Arthritis    • Diabetes mellitus (CMS/HCC)    • Finger fracture, left 2018   • GERD (gastroesophageal reflux disease)    • Painful swelling of joint    • Tear of meniscus of knee         Past Surgical History:   Procedure Laterality Date   • COLONOSCOPY     • FINGER/THUMB CLOSED REDUCTION AND PERCUTANEOUS PINNING Left 2018    Procedure: CLOSED REDUCTION AND LT. SMALL PERCUTANEOUS FINGER PINNING;  Surgeon: Raghu Porras MD;  Location: [facility];  Service: Hand   • HYDROCELECTOMY      late    • KNEE ARTHROSCOPY Left 2011   • KNEE SURGERY  2016    Right TKA   • TOTAL KNEE ARTHROPLASTY Left 2018    Procedure: TOTAL KNEE ARTHROPLASTY;  Surgeon: Nato Guerin MD;  Location: [facility];  Service: Orthopedics                       PT Assessment/Plan     Row Name 19 1153          PT Assessment    Assessment Comments  Improved ability to perform LAQ with 8# today. Near passive full extension after manual  -WS       User Key  (r) = Recorded By, (t) = Taken By, (c) = Cosigned By    Initials Name Provider Type    Domingo Sanchez PTA Physical Therapy Assistant          Modalities      Row Name 01/14/19 1110             Ice    Ice Applied  Yes  -WS      Location  L knee pt. supine with LLE elevated over 2 pillows  -WS      Rx Minutes  10 mins  -WS      Ice S/P Rx  Yes  -WS        User Key  (r) = Recorded By, (t) = Taken By, (c) = Cosigned By    Initials Name Provider Type    WS Domingo Hill PTA Physical Therapy Assistant          Exercises     Row Name 01/14/19 1110             Subjective Comments    Subjective Comments  Knee did good oever the weekend  -WS         Subjective Pain    Able to rate subjective pain?  yes  -WS      Pre-Treatment Pain Level  3  -WS         Total Minutes    36457 - PT Therapeutic Exercise Minutes  40  -WS         Exercise 2    Exercise Name 2  calf raise  -WS      Cueing 2  Verbal  -WS      Reps 2  20  -WS      Additional Comments  steps  -WS         Exercise 3    Exercise Name 3  wall slide  -WS      Cueing 3  Verbal;Demo  -WS      Reps 3  10  -WS      Time 3  10 s  -WS      Additional Comments  5#  -WS         Exercise 4    Exercise Name 4  mini squat  -WS      Cueing 4  Verbal;Demo  -WS      Reps 4  20  -WS         Exercise 5    Exercise Name 5  TKE  -WS      Cueing 5  Verbal;Demo  -WS      Reps 5  15  -WS      Time 5  5 s  -WS      Additional Comments  HB  -WS         Exercise 6    Exercise Name 6  Nustep, LE  -WS      Time 6  5  -WS      Additional Comments  seat 11/ L5  -WS         Exercise 7    Exercise Name 7  standing hip abd, bilateral   -WS      Cueing 7  Verbal;Demo  -WS      Reps 7  2  -WS      Time 7  10  -WS      Additional Comments  5/#  -WS         Exercise 8    Exercise Name 8  standing HS curl L   -WS      Cueing 8  Verbal;Demo  -WS      Reps 8  20  -WS      Additional Comments  5#  -WS         Exercise 9    Exercise Name 9  stair stretch: HS and Quad  -WS      Cueing 9  Verbal;Demo  -WS      Reps 9  3  -WS      Time 9  20 s  -WS         Exercise 12    Exercise Name 12  Recumbent bike  -WS      Cueing 12  Verbal;Demo  -WS      Time 12  5 min   -      Additional Comments  seat 9/8/7/  -WS         Exercise 13    Exercise Name 13  LAQ   -WS      Cueing 13  Verbal;Demo  -WS      Sets 13  2  -WS      Reps 13  10  -WS      Additional Comments  8#  -WS         Exercise 14    Exercise Name 14  leg press, LLE only   -WS      Cueing 14  Verbal;Demo  -WS      Sets 14  1  -WS      Reps 14  40  -WS      Additional Comments  80#  -WS        User Key  (r) = Recorded By, (t) = Taken By, (c) = Cosigned By    Initials Name Provider Type    Domingo Sanchez PTA Physical Therapy Assistant                         PT OP Goals     Row Name 01/14/19 1100          PT Short Term Goals    STG 1  Patient will be independent and compliant with initial home exercise program  -     STG 1 Progress  Met  -     STG 2  Patient will be independent with patellar and scar tissue mobilization techniques.  -     STG 2 Progress  Met  -     STG 3  Patient will be independent with education for symptom management, joint protection and strategies to minimize stress on affected tissues  -     STG 3 Progress  Met  -     STG 4  Patient will demonstrate L knee active flexion to >/= 95 degrees  -     STG 4 Progress  Ongoing  -     STG 5  Patient will be able to bring L leg onto/off of bed and into/out of car without use of B hands.   -     STG 5 Progress  Met  -        Long Term Goals    LTG 1  Patient will be independent and compliant with advanced home exercise program to facilitate self-management of symptoms  -     LTG 1 Progress  Progressing  -     LTG 2  Patient will ambulate with least restrictive assistive device with near normal gait on level ground short community distances  -     LTG 2 Progress  Ongoing  -     LTG 3  Patient will demonstrate improved L knee flexion to at least 110 degrees and full knee extension for improved AROM and ease with ADLs.  -     LTG 3 Progress  Ongoing  -     LTG 4  Patient will negotiate stairs reciprocally with rail support  as needed.  -     LTG 4 Progress  Ongoing  -     LTG 5  Patient will demonstrate 4+/5 L knee strength (flexion/extension) for improved function and ease with return to deficit free ambulation and stair negotiation.  -     LTG 5 Progress  Progressing  -       User Key  (r) = Recorded By, (t) = Taken By, (c) = Cosigned By    Initials Name Provider Type    Domingo Sanchez PTA Physical Therapy Assistant          Therapy Education  Given: HEP, Symptoms/condition management, Posture/body mechanics, Edema management  Program: Reinforced  How Provided: Verbal  Provided to: Patient              Time Calculation:   Start Time: 1110  Stop Time: 1200  Time Calculation (min): 50 min  Therapy Suggested Charges     Code   Minutes Charges    11972 (CPT®) Hc Pt Neuromusc Re Education Ea 15 Min      68188 (CPT®) Hc Pt Ther Proc Ea 15 Min 40 3    63786 (CPT®) Hc Gait Training Ea 15 Min      79708 (CPT®) Hc Pt Therapeutic Act Ea 15 Min      97754 (CPT®) Hc Pt Manual Therapy Ea 15 Min      59744 (CPT®) Hc Pt Ther Massage- Per 15 Min      00145 (CPT®) Hc Pt Iontophoresis Ea 15 Min      53789 (CPT®) Hc Pt Elec Stim Ea-Per 15 Min      03546 (CPT®) Hc Pt Ultrasound Ea 15 Min      27455 (CPT®) Hc Pt Self Care/Mgmt/Train Ea 15 Min      91684 (CPT®) Hc Pt Prosthetic (S) Train Initial Encounter, Each 15 Min      13024 (CPT®) Hc Orthotic(S) Mgmt/Train Initial Encounter, Each 15min      68145 (CPT®) Hc Pt Aquatic Therapy Ea 15 Min      94839 (CPT®) Hc Pt Orthotic(S)/Prosthetic(S) Encounter, Each 15 Min       (CPT®) Hc Pt Electrical Stim Unattended      Total  40 3        Therapy Charges for Today     Code Description Service Date Service Provider Modifiers Qty    19607902724 HC PT THER PROC EA 15 MIN 1/14/2019 Domingo Hill PTA GP 3    89472541186 HC PT HOT OR COLD PACK TREAT [facility] 1/14/2019 Domingo Hill PTA GP 1                    Domingo Hill PTA  1/14/2019

## 2019-01-16 ENCOUNTER — HOSPITAL ENCOUNTER (OUTPATIENT)
Dept: PHYSICAL THERAPY | Facility: HOSPITAL | Age: 64
Setting detail: THERAPIES SERIES
Discharge: HOME | End: 2019-01-16

## 2019-01-16 DIAGNOSIS — Z47.89 ORTHOPEDIC AFTERCARE: Primary | ICD-10-CM

## 2019-01-16 DIAGNOSIS — M25.562 CHRONIC PAIN OF LEFT KNEE: ICD-10-CM

## 2019-01-16 DIAGNOSIS — Z78.9 IMPAIRED MOBILITY AND ACTIVITIES OF DAILY LIVING: ICD-10-CM

## 2019-01-16 DIAGNOSIS — Z74.09 IMPAIRED MOBILITY AND ACTIVITIES OF DAILY LIVING: ICD-10-CM

## 2019-01-16 DIAGNOSIS — Z96.652 STATUS POST LEFT KNEE REPLACEMENT: ICD-10-CM

## 2019-01-16 DIAGNOSIS — G89.29 CHRONIC PAIN OF LEFT KNEE: ICD-10-CM

## 2019-01-16 PROCEDURE — 97110 THERAPEUTIC EXERCISES: CPT | Performed by: PHYSICAL THERAPIST

## 2019-01-16 NOTE — THERAPY TREATMENT NOTE
Outpatient Physical Therapy Ortho Treatment Note  [facility]     Patient Name: Bob Biggs Sr.  : 1955  MRN: [id number]  Today's Date: 2019      Visit Date: 2019    Visit Dx:    ICD-10-CM ICD-9-CM   1. Orthopedic aftercare Z47.89 V54.9   2. Impaired mobility and activities of daily living Z74.09 799.89   3. Chronic pain of left knee M25.562 719.46    G89.29 338.29   4. Status post left knee replacement Z96.652 V43.65       Patient Active Problem List   Diagnosis   • Osteoarthritis of right knee   • Status post total right knee replacement   • Diabetes (CMS/HCC)   • Arthritis   • Arthritis   • GERD (gastroesophageal reflux disease)   • Chronic pain of left knee   • Osteoarthritis of left knee   • Decreased mobility        Past Medical History:   Diagnosis Date   • Arthritis    • Diabetes mellitus (CMS/HCC)    • Finger fracture, left 2018   • GERD (gastroesophageal reflux disease)    • Painful swelling of joint    • Tear of meniscus of knee         Past Surgical History:   Procedure Laterality Date   • COLONOSCOPY     • FINGER/THUMB CLOSED REDUCTION AND PERCUTANEOUS PINNING Left 2018    Procedure: CLOSED REDUCTION AND LT. SMALL PERCUTANEOUS FINGER PINNING;  Surgeon: Raghu Porras MD;  Location: [facility];  Service: Hand   • HYDROCELECTOMY      late    • KNEE ARTHROSCOPY Left 2011   • KNEE SURGERY  2016    Right TKA   • TOTAL KNEE ARTHROPLASTY Left 2018    Procedure: TOTAL KNEE ARTHROPLASTY;  Surgeon: Nato Guerin MD;  Location: [facility];  Service: Orthopedics       PT Ortho     Row Name 19 1100       Left Lower Ext    Lt Knee Extension/Flexion AROM  102  -GJ    Lt Knee Extension/Flexion PROM  105  -GJ      User Key  (r) = Recorded By, (t) = Taken By, (c) = Cosigned By    Initials Name Provider Type    Felix Roy, PT Physical Therapist                      PT Assessment/Plan     Row Name 19 1542          PT Assessment     Assessment Comments  Mr. Biggs is 10 weeks s/p L TKA.  He demosntrates improving L knee flexion (AROM 102), decreasing heat from L knee, improving gait pattern.  He is very motivated and continues to progress toward all functional goals at this time.   -GJ        PT Plan    PT Plan Comments  Strengthenign to continue once every other day, continue to work flexion/ext ROM, consider step up   -GJ       User Key  (r) = Recorded By, (t) = Taken By, (c) = Cosigned By    Initials Name Provider Type    Felix Roy, PT Physical Therapist          Modalities     Row Name 01/16/19 1100             Ice    Ice Applied  Yes  -GJ      Location  L knee pt. supine with LLE elevated over 2 pillows  -GJ      Rx Minutes  10 mins  -GJ      Ice S/P Rx  Yes  -GJ        User Key  (r) = Recorded By, (t) = Taken By, (c) = Cosigned By    Initials Name Provider Type    Felix Roy, PT Physical Therapist          Exercises     Row Name 01/16/19 1136 01/16/19 1100          Subjective Comments    Subjective Comments  --  I'd be doing alright if I could get rid of this stiffness  -GJ        Subjective Pain    Pre-Treatment Pain Level  --  3  -GJ        Total Minutes    15487 - PT Therapeutic Exercise Minutes  46  -GJ  --        Exercise 2    Exercise Name 2  --  calf raise  -GJ     Cueing 2  --  Verbal  -GJ     Reps 2  --  20  -GJ     Additional Comments  --  steps  -GJ        Exercise 3    Exercise Name 3  --  wall slide  -GJ     Cueing 3  --  Verbal;Demo  -GJ     Reps 3  --  10  -GJ     Time 3  --  10 s  -GJ     Additional Comments  --  5#  -GJ        Exercise 4    Exercise Name 4  --  mini squat  -GJ     Cueing 4  --  Verbal;Demo  -GJ     Reps 4  --  20  -GJ        Exercise 5    Exercise Name 5  --  TKE  -GJ     Cueing 5  --  Verbal;Demo  -GJ     Reps 5  --  15  -GJ     Time 5  --  5 s  -GJ     Additional Comments  --  HB  -GJ        Exercise 6    Exercise Name 6  --  Nustep, LE  -GJ     Time 6  --  5  -GJ        Exercise 7     Exercise Name 7  --  standing hip abd, bilateral   -GJ     Cueing 7  --  Verbal;Demo  -GJ     Reps 7  --  2  -GJ     Time 7  --  10  -GJ     Additional Comments  --  5#  -GJ        Exercise 8    Exercise Name 8  --  standing HS curl L   -GJ     Cueing 8  --  Verbal;Demo  -GJ     Reps 8  --  20  -GJ     Additional Comments  --  5#  -GJ        Exercise 9    Exercise Name 9  --  stair stretch: HS and Quad  -GJ     Cueing 9  --  Verbal;Demo  -GJ     Reps 9  --  3  -GJ     Time 9  --  20 s  -GJ        Exercise 12    Exercise Name 12  --  Recumbent bike  -GJ     Cueing 12  --  Verbal;Demo  -GJ     Time 12  --  5 min  -GJ     Additional Comments  --  seat 8/7/6  -GJ        Exercise 13    Exercise Name 13  --  LAQ   -GJ     Cueing 13  --  Verbal;Demo  -GJ     Sets 13  --  2  -GJ     Reps 13  --  10  -GJ     Additional Comments  --  8#  -GJ        Exercise 14    Exercise Name 14  --  leg press, LLE only   -GJ     Cueing 14  --  Verbal;Demo  -GJ     Sets 14  --  2  -GJ     Reps 14  --  20  -GJ     Additional Comments  --  80#  -GJ       User Key  (r) = Recorded By, (t) = Taken By, (c) = Cosigned By    Initials Name Provider Type    Felix Roy, PT Physical Therapist                        Manual Rx (last 36 hours)      Manual Treatments     Row Name 01/16/19 1100             Manual Rx 3    Manual Rx 3 Location  mobs to L patella, medial/lateral, inferior/superior  -GJ      Manual Rx 3 Duration  billed as ther proceduree (5 min.)  -GJ        User Key  (r) = Recorded By, (t) = Taken By, (c) = Cosigned By    Initials Name Provider Type    Felix Roy, PT Physical Therapist          PT OP Goals     Row Name 01/16/19 1100          PT Short Term Goals    STG 1  Patient will be independent and compliant with initial home exercise program  -GJ     STG 1 Progress  Met  -GJ     STG 2  Patient will be independent with patellar and scar tissue mobilization techniques.  -GJ     STG 2 Progress  Met  -GJ     STG 3  Patient  will be independent with education for symptom management, joint protection and strategies to minimize stress on affected tissues  -GJ     STG 3 Progress  Met  -GJ     STG 4  Patient will demonstrate L knee active flexion to >/= 95 degrees  -GJ     STG 4 Progress  Met  -GJ     STG 5  Patient will be able to bring L leg onto/off of bed and into/out of car without use of B hands.   -GJ     STG 5 Progress  Met  -GJ        Long Term Goals    LTG 1  Patient will be independent and compliant with advanced home exercise program to facilitate self-management of symptoms  -GJ     LTG 1 Progress  Progressing  -GJ     LTG 2  Patient will ambulate with least restrictive assistive device with near normal gait on level ground short community distances  -GJ     LTG 2 Progress  Partially Met  -GJ     LTG 2 Progress Comments  no AD, mild limp  -GJ     LTG 3  Patient will demonstrate improved L knee flexion to at least 110 degrees and full knee extension for improved AROM and ease with ADLs.  -GJ     LTG 3 Progress  Ongoing  -GJ     LTG 4  Patient will negotiate stairs reciprocally with rail support as needed.  -GJ     LTG 4 Progress  Ongoing  -GJ     LTG 5  Patient will demonstrate 4+/5 L knee strength (flexion/extension) for improved function and ease with return to deficit free ambulation and stair negotiation.  -GJ     LTG 5 Progress  Progressing  -GJ       User Key  (r) = Recorded By, (t) = Taken By, (c) = Cosigned By    Initials Name Provider Type    Felix Roy, PT Physical Therapist          Therapy Education  Education Details: HEP to contine once every other day.    Given: HEP, Symptoms/condition management, Mobility training, Posture/body mechanics  Program: Reinforced  How Provided: Verbal  Provided to: Patient  Level of Understanding: Teach back education performed, Verbalized              Time Calculation:   Start Time: 1130  Stop Time: 1230  Time Calculation (min): 60 min  Therapy Suggested Charges     Code    Minutes Charges    48578 (CPT®) Hc Pt Neuromusc Re Education Ea 15 Min      51177 (CPT®) Hc Pt Ther Proc Ea 15 Min 46 3    61228 (CPT®) Hc Gait Training Ea 15 Min      04526 (CPT®) Hc Pt Therapeutic Act Ea 15 Min      85831 (CPT®) Hc Pt Manual Therapy Ea 15 Min      26738 (CPT®) Hc Pt Ther Massage- Per 15 Min      44356 (CPT®) Hc Pt Iontophoresis Ea 15 Min      62125 (CPT®) Hc Pt Elec Stim Ea-Per 15 Min      20251 (CPT®) Hc Pt Ultrasound Ea 15 Min      48121 (CPT®) Hc Pt Self Care/Mgmt/Train Ea 15 Min      36499 (CPT®) Hc Pt Prosthetic (S) Train Initial Encounter, Each 15 Min      75309 (CPT®) Hc Orthotic(S) Mgmt/Train Initial Encounter, Each 15min      38485 (CPT®) Hc Pt Aquatic Therapy Ea 15 Min      55787 (CPT®) Hc Pt Orthotic(S)/Prosthetic(S) Encounter, Each 15 Min       (CPT®) Hc Pt Electrical Stim Unattended      Total  46 3        Therapy Charges for Today     Code Description Service Date Service Provider Modifiers Qty    41137531733 HC PT THER PROC EA 15 MIN 1/16/2019 Felix Villa, PT GP 3    51717352538 HC PT HOT OR COLD PACK TREAT [facility] 1/16/2019 Felix Villa, PT GP 1                    Felix Villa, PT  1/16/2019

## 2019-01-18 ENCOUNTER — HOSPITAL ENCOUNTER (OUTPATIENT)
Dept: PHYSICAL THERAPY | Facility: HOSPITAL | Age: 64
Setting detail: THERAPIES SERIES
Discharge: HOME | End: 2019-01-18

## 2019-01-18 DIAGNOSIS — M25.562 CHRONIC PAIN OF LEFT KNEE: ICD-10-CM

## 2019-01-18 DIAGNOSIS — G89.29 CHRONIC PAIN OF LEFT KNEE: ICD-10-CM

## 2019-01-18 DIAGNOSIS — Z78.9 IMPAIRED MOBILITY AND ACTIVITIES OF DAILY LIVING: ICD-10-CM

## 2019-01-18 DIAGNOSIS — Z74.09 IMPAIRED MOBILITY AND ACTIVITIES OF DAILY LIVING: ICD-10-CM

## 2019-01-18 DIAGNOSIS — Z47.89 ORTHOPEDIC AFTERCARE: Primary | ICD-10-CM

## 2019-01-18 DIAGNOSIS — Z96.652 STATUS POST LEFT KNEE REPLACEMENT: ICD-10-CM

## 2019-01-18 PROCEDURE — 97110 THERAPEUTIC EXERCISES: CPT | Performed by: PHYSICAL THERAPIST

## 2019-01-18 RX ORDER — OXYCODONE AND ACETAMINOPHEN 7.5; 325 MG/1; MG/1
1 TABLET ORAL EVERY 4 HOURS PRN
Qty: 60 TABLET | Refills: 0 | Status: SHIPPED | OUTPATIENT
Start: 2019-01-18 | End: 2019-01-28

## 2019-01-18 NOTE — THERAPY TREATMENT NOTE
Outpatient Physical Therapy Ortho Treatment Note  [facility]     Patient Name: Bob Biggs Sr.  : 1955  MRN: [id number]  Today's Date: 2019      Visit Date: 2019    Visit Dx:    ICD-10-CM ICD-9-CM   1. Orthopedic aftercare Z47.89 V54.9   2. Impaired mobility and activities of daily living Z74.09 799.89   3. Chronic pain of left knee M25.562 719.46    G89.29 338.29   4. Status post left knee replacement Z96.652 V43.65       Patient Active Problem List   Diagnosis   • Osteoarthritis of right knee   • Status post total right knee replacement   • Diabetes (CMS/HCC)   • Arthritis   • Arthritis   • GERD (gastroesophageal reflux disease)   • Chronic pain of left knee   • Osteoarthritis of left knee   • Decreased mobility        Past Medical History:   Diagnosis Date   • Arthritis    • Diabetes mellitus (CMS/HCC)    • Finger fracture, left 2018   • GERD (gastroesophageal reflux disease)    • Painful swelling of joint    • Tear of meniscus of knee         Past Surgical History:   Procedure Laterality Date   • COLONOSCOPY     • FINGER/THUMB CLOSED REDUCTION AND PERCUTANEOUS PINNING Left 2018    Procedure: CLOSED REDUCTION AND LT. SMALL PERCUTANEOUS FINGER PINNING;  Surgeon: Raghu Porras MD;  Location: [facility];  Service: Hand   • HYDROCELECTOMY      late    • KNEE ARTHROSCOPY Left 2011   • KNEE SURGERY  2016    Right TKA   • TOTAL KNEE ARTHROPLASTY Left 2018    Procedure: TOTAL KNEE ARTHROPLASTY;  Surgeon: Nato Guerin MD;  Location: [facility];  Service: Orthopedics       PT Ortho     Row Name 19 1100       Left Lower Ext    Lt Knee Extension/Flexion AROM  102  -GJ    Lt Knee Extension/Flexion PROM  105  -GJ      User Key  (r) = Recorded By, (t) = Taken By, (c) = Cosigned By    Initials Name Provider Type    Felix Roy, PT Physical Therapist                      PT Assessment/Plan     Row Name 19 1540          PT Assessment     Assessment Comments  ROM achieved to 109 with supine wall slides today after manual therapy techniques to release soft tissue restrictions. Continued with strengthening program, progressing to 5lb ankle weights with hamstring curls and hip abduction.   -CK       User Key  (r) = Recorded By, (t) = Taken By, (c) = Cosigned By    Initials Name Provider Type    CK Rahul Valdovinos S, PT Physical Therapist          Modalities     Row Name 01/18/19 1300             Ice    Ice Applied  Yes  -CK      Location  L knee pt. supine with LLE elevated over 2 pillows  -CK      Rx Minutes  10 mins  -CK      Ice S/P Rx  Yes  -CK        User Key  (r) = Recorded By, (t) = Taken By, (c) = Cosigned By    Initials Name Provider Type    CK Rahul Valdovinos S, PT Physical Therapist          Exercises     Row Name 01/18/19 1500 01/18/19 1300          Subjective Comments    Subjective Comments  --  Still warm. I see the MD on the 28th.   -CK        Subjective Pain    Able to rate subjective pain?  --  yes  -CK     Pre-Treatment Pain Level  --  3  -CK        Total Minutes    56498 - PT Therapeutic Exercise Minutes  --  40  -CK     98300 - PT Manual Therapy Minutes  6  -CK  --        Exercise 2    Exercise Name 2  --  calf raise  -CK     Cueing 2  --  Verbal  -CK     Reps 2  --  20  -CK        Exercise 3    Exercise Name 3  --  wall slide  -CK     Cueing 3  --  Verbal;Demo  -CK     Reps 3  --  10  -CK     Time 3  --  10 s  -CK     Additional Comments  --  #5  -CK        Exercise 4    Exercise Name 4  --  mini squat  -CK     Cueing 4  --  Verbal;Demo  -CK     Reps 4  --  20  -CK        Exercise 5    Exercise Name 5  --  TKE  -CK     Cueing 5  --  Verbal;Demo  -CK     Reps 5  --  15  -CK     Time 5  --  5 s  -CK     Additional Comments  --  HB  -CK        Exercise 6    Exercise Name 6  --  stairs, up/down reciprocal  -CK     Reps 6  --  2 times  -CK        Exercise 7    Exercise Name 7  --  standing hip abd, bilateral   -CK     Cueing 7  --   Verbal;Demo  -CK     Reps 7  --  2  -CK     Time 7  --  10  -CK     Additional Comments  --  #7  -CK        Exercise 8    Exercise Name 8  --  standing HS curl L   -CK     Cueing 8  --  Verbal;Demo  -CK     Reps 8  --  20  -CK     Additional Comments  --  #7  -CK        Exercise 9    Exercise Name 9  --  stair stretch: HS and Quad  -CK     Cueing 9  --  Verbal;Demo  -CK     Reps 9  --  3  -CK     Time 9  --  20 s  -CK        Exercise 11    Exercise Name 11  --  Nustep, L5  -CK     Time 11  --  4 min  -CK        Exercise 12    Exercise Name 12  --  Recumbent bike  -CK     Cueing 12  --  Verbal  -CK     Time 12  --  5 min  -CK     Additional Comments  --  Seat 6  -CK        Exercise 13    Exercise Name 13  --  LAQ   -CK     Cueing 13  --  Verbal;Demo  -CK     Sets 13  --  3  -CK     Reps 13  --  10  -CK     Additional Comments  --  #8  -CK        Exercise 14    Exercise Name 14  --  leg press, LLE only   -CK     Cueing 14  --  Verbal;Demo  -CK     Sets 14  --  2  -CK     Reps 14  --  20  -CK     Additional Comments  --  #85  -CK       User Key  (r) = Recorded By, (t) = Taken By, (c) = Cosigned By    Initials Name Provider Type    CK Rahul Valdovinos, PT Physical Therapist                        Manual Rx (last 36 hours)      Manual Treatments     Row Name 01/18/19 1500             Total Minutes    70269 - PT Manual Therapy Minutes  6  -CK         Manual Rx 1    Manual Rx 1 Location  L quad, hamstring, calf STM/MFR  -CK        User Key  (r) = Recorded By, (t) = Taken By, (c) = Cosigned By    Initials Name Provider Type    CK Rahul Valdovinos S, PT Physical Therapist                             Time Calculation:   Start Time: 1315  Stop Time: 1410  Time Calculation (min): 55 min  Total Timed Code Minutes- PT: 46 minute(s)  Therapy Suggested Charges     Code   Minutes Charges    46805 (CPT®) Hc Pt Neuromusc Re Education Ea 15 Min      89419 (CPT®) Hc Pt Ther Proc Ea 15 Min 40 3    21476 (CPT®) Hc Gait Training Ea 15 Min       79391 (CPT®) Hc Pt Therapeutic Act Ea 15 Min      81544 (CPT®) Hc Pt Manual Therapy Ea 15 Min 6     56380 (CPT®) Hc Pt Ther Massage- Per 15 Min      24166 (CPT®) Hc Pt Iontophoresis Ea 15 Min      44410 (CPT®) Hc Pt Elec Stim Ea-Per 15 Min      85396 (CPT®) Hc Pt Ultrasound Ea 15 Min      82392 (CPT®) Hc Pt Self Care/Mgmt/Train Ea 15 Min      86278 (CPT®) Hc Pt Prosthetic (S) Train Initial Encounter, Each 15 Min      07060 (CPT®) Hc Orthotic(S) Mgmt/Train Initial Encounter, Each 15min      48507 (CPT®) Hc Pt Aquatic Therapy Ea 15 Min      67643 (CPT®) Hc Pt Orthotic(S)/Prosthetic(S) Encounter, Each 15 Min       (CPT®) Hc Pt Electrical Stim Unattended      Total  46 3        Therapy Charges for Today     Code Description Service Date Service Provider Modifiers Qty    67474069170 HC PT THER PROC EA 15 MIN 1/18/2019 Rahul Valdovinos, PT GP 3    10221047863 HC PT HOT OR COLD PACK TREAT [facility] 1/18/2019 Rahul Valdovinos, PT GP 1                    Rahul Valdovinos, PT  1/18/2019

## 2019-01-21 ENCOUNTER — HOSPITAL ENCOUNTER (OUTPATIENT)
Dept: PHYSICAL THERAPY | Facility: HOSPITAL | Age: 64
Setting detail: THERAPIES SERIES
Discharge: HOME | End: 2019-01-21

## 2019-01-21 DIAGNOSIS — Z96.652 STATUS POST LEFT KNEE REPLACEMENT: ICD-10-CM

## 2019-01-21 DIAGNOSIS — Z47.89 ORTHOPEDIC AFTERCARE: Primary | ICD-10-CM

## 2019-01-21 DIAGNOSIS — G89.29 CHRONIC PAIN OF LEFT KNEE: ICD-10-CM

## 2019-01-21 DIAGNOSIS — Z74.09 IMPAIRED MOBILITY AND ACTIVITIES OF DAILY LIVING: ICD-10-CM

## 2019-01-21 DIAGNOSIS — Z78.9 IMPAIRED MOBILITY AND ACTIVITIES OF DAILY LIVING: ICD-10-CM

## 2019-01-21 DIAGNOSIS — M25.562 CHRONIC PAIN OF LEFT KNEE: ICD-10-CM

## 2019-01-21 PROCEDURE — 97110 THERAPEUTIC EXERCISES: CPT

## 2019-01-21 NOTE — THERAPY TREATMENT NOTE
Outpatient Physical Therapy Ortho Treatment Note  [facility]     Patient Name: Bob Biggs Sr.  : 1955  MRN: [id number]  Today's Date: 2019      Visit Date: 2019    Visit Dx:    ICD-10-CM ICD-9-CM   1. Orthopedic aftercare Z47.89 V54.9   2. Impaired mobility and activities of daily living Z74.09 799.89   3. Chronic pain of left knee M25.562 719.46    G89.29 338.29   4. Status post left knee replacement Z96.652 V43.65       Patient Active Problem List   Diagnosis   • Osteoarthritis of right knee   • Status post total right knee replacement   • Diabetes (CMS/HCC)   • Arthritis   • Arthritis   • GERD (gastroesophageal reflux disease)   • Chronic pain of left knee   • Osteoarthritis of left knee   • Decreased mobility        Past Medical History:   Diagnosis Date   • Arthritis    • Diabetes mellitus (CMS/HCC)    • Finger fracture, left 2018   • GERD (gastroesophageal reflux disease)    • Painful swelling of joint    • Tear of meniscus of knee         Past Surgical History:   Procedure Laterality Date   • COLONOSCOPY     • FINGER/THUMB CLOSED REDUCTION AND PERCUTANEOUS PINNING Left 2018    Procedure: CLOSED REDUCTION AND LT. SMALL PERCUTANEOUS FINGER PINNING;  Surgeon: Raghu Porras MD;  Location: [facility];  Service: Hand   • HYDROCELECTOMY      late    • KNEE ARTHROSCOPY Left 2011   • KNEE SURGERY  2016    Right TKA   • TOTAL KNEE ARTHROPLASTY Left 2018    Procedure: TOTAL KNEE ARTHROPLASTY;  Surgeon: Nato Guerin MD;  Location: [facility];  Service: Orthopedics                       PT Assessment/Plan     Row Name 19 1227          PT Assessment    Assessment Comments  Patient ambulating with no AD today. Gait pattern improving. Increased weight with LAQ. Performed  prone knee extension   -AVINASH       User Key  (r) = Recorded By, (t) = Taken By, (c) = Cosigned By    Initials Name Provider Type    Domingo Sanchez PTA Physical Therapy  Assistant          Modalities     Row Name 01/21/19 1150             Ice    Ice Applied  Yes  -WS      Location  L knee pt. supine with LLE elevated over 2 pillows  -WS      Rx Minutes  10 mins  -WS      Ice S/P Rx  Yes  -WS        User Key  (r) = Recorded By, (t) = Taken By, (c) = Cosigned By    Initials Name Provider Type    Domingo Sanchez PTA Physical Therapy Assistant          Exercises     Row Name 01/21/19 1150             Subjective Comments    Subjective Comments  Not taking pain meds. Knee pain decreasing  -WS         Subjective Pain    Able to rate subjective pain?  yes  -WS      Pre-Treatment Pain Level  2  -WS         Total Minutes    42603 - PT Therapeutic Exercise Minutes  40  -WS         Exercise 2    Exercise Name 2  calf raise  -WS      Cueing 2  Verbal  -WS      Reps 2  20  -WS         Exercise 3    Exercise Name 3  wall slide  -WS      Cueing 3  Verbal;Demo  -WS      Reps 3  10  -WS      Time 3  10 s  -WS      Additional Comments  5#  -WS         Exercise 4    Exercise Name 4  mini squat  -WS      Cueing 4  Verbal;Demo  -WS      Reps 4  20  -WS         Exercise 5    Exercise Name 5  TKE  -WS      Cueing 5  Verbal;Demo  -WS      Reps 5  15  -WS      Time 5  5 s  -WS      Additional Comments  HB  -WS         Exercise 6    Exercise Name 6  prone 5# 1 min  -WS         Exercise 7    Exercise Name 7  standing hip abd, bilateral   -WS      Reps 7  2  -WS      Time 7  10  -WS      Additional Comments  7#  -WS         Exercise 8    Exercise Name 8  standing HS curl L   -WS      Cueing 8  Verbal;Demo  -WS      Reps 8  20  -WS      Additional Comments  7#  -WS         Exercise 9    Exercise Name 9  stair stretch: HS and Quad  -WS      Cueing 9  Verbal;Demo  -WS      Reps 9  3  -WS      Time 9  20 s  -WS         Exercise 10    Exercise Name 10  SAQ  -WS      Sets 10  2  -WS      Reps 10  10  -WS      Additional Comments  10#  -WS         Exercise 11    Exercise Name 11  Nustep, L5  -WS      Time 11  5  min  -WS         Exercise 12    Exercise Name 12  Recumbent bike  -WS      Cueing 12  Verbal  -WS      Time 12  5 min  -WS      Additional Comments  seat 6  -WS         Exercise 13    Exercise Name 13  LAQ   -WS      Cueing 13  Verbal;Demo  -WS      Sets 13  3  -WS      Reps 13  10  -WS      Additional Comments  10#  -WS         Exercise 14    Exercise Name 14  leg press, LLE only   -WS      Cueing 14  Verbal;Demo  -WS      Sets 14  2  -WS      Reps 14  20  -WS      Additional Comments  85#  -WS        User Key  (r) = Recorded By, (t) = Taken By, (c) = Cosigned By    Initials Name Provider Type    WS Domingo Hill PTA Physical Therapy Assistant                         PT OP Goals     Row Name 01/21/19 1200          PT Short Term Goals    STG 1  Patient will be independent and compliant with initial home exercise program  -WS     STG 1 Progress  Met  -WS     STG 2  Patient will be independent with patellar and scar tissue mobilization techniques.  -WS     STG 2 Progress  Met  -WS     STG 3  Patient will be independent with education for symptom management, joint protection and strategies to minimize stress on affected tissues  -WS     STG 3 Progress  Met  -WS     STG 4  Patient will demonstrate L knee active flexion to >/= 95 degrees  -     STG 4 Progress  Met  -WS     STG 5  Patient will be able to bring L leg onto/off of bed and into/out of car without use of B hands.   -     STG 5 Progress  Met  -        Long Term Goals    LTG 1  Patient will be independent and compliant with advanced home exercise program to facilitate self-management of symptoms  -     LTG 1 Progress  Progressing  -     LTG 2  Patient will ambulate with least restrictive assistive device with near normal gait on level ground short community distances  -WS     LTG 2 Progress  Partially Met  -WS     LTG 3  Patient will demonstrate improved L knee flexion to at least 110 degrees and full knee extension for improved AROM and ease with  ADLs.  -     LTG 3 Progress  Ongoing  -     LTG 4  Patient will negotiate stairs reciprocally with rail support as needed.  -     LTG 4 Progress  Ongoing  -     LTG 5  Patient will demonstrate 4+/5 L knee strength (flexion/extension) for improved function and ease with return to deficit free ambulation and stair negotiation.  -     LTG 5 Progress  Progressing  -       User Key  (r) = Recorded By, (t) = Taken By, (c) = Cosigned By    Initials Name Provider Type    Domingo Sanchez PTA Physical Therapy Assistant          Therapy Education  Given: HEP, Symptoms/condition management, Pain management, Edema management  Program: Reinforced  How Provided: Verbal  Provided to: Patient              Time Calculation:   Start Time: 1150  Stop Time: 1240  Time Calculation (min): 50 min  Therapy Suggested Charges     Code   Minutes Charges    00281 (CPT®) Hc Pt Neuromusc Re Education Ea 15 Min      64037 (CPT®) Hc Pt Ther Proc Ea 15 Min 40 3    35618 (CPT®) Hc Gait Training Ea 15 Min      50603 (CPT®) Hc Pt Therapeutic Act Ea 15 Min      86034 (CPT®) Hc Pt Manual Therapy Ea 15 Min      28009 (CPT®) Hc Pt Ther Massage- Per 15 Min      91359 (CPT®) Hc Pt Iontophoresis Ea 15 Min      27017 (CPT®) Hc Pt Elec Stim Ea-Per 15 Min      39655 (CPT®) Hc Pt Ultrasound Ea 15 Min      28856 (CPT®) Hc Pt Self Care/Mgmt/Train Ea 15 Min      43324 (CPT®) Hc Pt Prosthetic (S) Train Initial Encounter, Each 15 Min      07589 (CPT®) Hc Orthotic(S) Mgmt/Train Initial Encounter, Each 15min      42498 (CPT®) Hc Pt Aquatic Therapy Ea 15 Min      70989 (CPT®) Hc Pt Orthotic(S)/Prosthetic(S) Encounter, Each 15 Min       (CPT®) Hc Pt Electrical Stim Unattended      Total  40 3        Therapy Charges for Today     Code Description Service Date Service Provider Modifiers Qty    62455060875 HC PT THER PROC EA 15 MIN 1/21/2019 Domingo Hill PTA GP 3    53015546462 HC PT HOT OR COLD PACK TREAT MCARE 1/21/2019 Domingo Hill PTA  GP 1                    Domingo Hill, PTA  1/21/2019

## 2019-01-23 ENCOUNTER — HOSPITAL ENCOUNTER (OUTPATIENT)
Dept: PHYSICAL THERAPY | Facility: HOSPITAL | Age: 64
Setting detail: THERAPIES SERIES
Discharge: HOME | End: 2019-01-23

## 2019-01-23 DIAGNOSIS — G89.29 CHRONIC PAIN OF LEFT KNEE: ICD-10-CM

## 2019-01-23 DIAGNOSIS — Z74.09 IMPAIRED MOBILITY AND ACTIVITIES OF DAILY LIVING: ICD-10-CM

## 2019-01-23 DIAGNOSIS — Z47.89 ORTHOPEDIC AFTERCARE: Primary | ICD-10-CM

## 2019-01-23 DIAGNOSIS — Z78.9 IMPAIRED MOBILITY AND ACTIVITIES OF DAILY LIVING: ICD-10-CM

## 2019-01-23 DIAGNOSIS — Z96.652 STATUS POST LEFT KNEE REPLACEMENT: ICD-10-CM

## 2019-01-23 DIAGNOSIS — M25.562 CHRONIC PAIN OF LEFT KNEE: ICD-10-CM

## 2019-01-23 PROCEDURE — 97110 THERAPEUTIC EXERCISES: CPT

## 2019-01-23 NOTE — THERAPY TREATMENT NOTE
Outpatient Physical Therapy Ortho Treatment Note  [facility]     Patient Name: Bob Biggs Sr.  : 1955  MRN: [id number]  Today's Date: 2019      Visit Date: 2019    Visit Dx:    ICD-10-CM ICD-9-CM   1. Orthopedic aftercare Z47.89 V54.9   2. Impaired mobility and activities of daily living Z74.09 799.89   3. Chronic pain of left knee M25.562 719.46    G89.29 338.29   4. Status post left knee replacement Z96.652 V43.65       Patient Active Problem List   Diagnosis   • Osteoarthritis of right knee   • Status post total right knee replacement   • Diabetes (CMS/HCC)   • Arthritis   • Arthritis   • GERD (gastroesophageal reflux disease)   • Chronic pain of left knee   • Osteoarthritis of left knee   • Decreased mobility        Past Medical History:   Diagnosis Date   • Arthritis    • Diabetes mellitus (CMS/HCC)    • Finger fracture, left 2018   • GERD (gastroesophageal reflux disease)    • Painful swelling of joint    • Tear of meniscus of knee         Past Surgical History:   Procedure Laterality Date   • COLONOSCOPY     • FINGER/THUMB CLOSED REDUCTION AND PERCUTANEOUS PINNING Left 2018    Procedure: CLOSED REDUCTION AND LT. SMALL PERCUTANEOUS FINGER PINNING;  Surgeon: Raghu Porras MD;  Location: [facility];  Service: Hand   • HYDROCELECTOMY      late    • KNEE ARTHROSCOPY Left 2011   • KNEE SURGERY  2016    Right TKA   • TOTAL KNEE ARTHROPLASTY Left 2018    Procedure: TOTAL KNEE ARTHROPLASTY;  Surgeon: Nato Guerin MD;  Location: [facility];  Service: Orthopedics                       PT Assessment/Plan     Row Name 19 1147          PT Assessment    Assessment Comments  Tolerated increased weight on leg press. Continue to work on functional strengthening and full knee extension.   -AVINASH       User Key  (r) = Recorded By, (t) = Taken By, (c) = Cosigned By    Initials Name Provider Type    Domingo Sanchez PTA Physical Therapy  Assistant          Modalities     Row Name 01/23/19 1105             Ice    Ice Applied  Yes  -WS      Location  L knee pt. supine with LLE elevated over 2 pillows  -WS      Rx Minutes  10 mins  -WS      Ice Prior to Rx  Yes  -WS        User Key  (r) = Recorded By, (t) = Taken By, (c) = Cosigned By    Initials Name Provider Type    WS Domingo Hill PTA Physical Therapy Assistant          Exercises     Row Name 01/23/19 1105             Subjective Comments    Subjective Comments  knee is just stiff  -WS         Subjective Pain    Able to rate subjective pain?  yes  -WS      Pre-Treatment Pain Level  2  -WS         Total Minutes    80026 - PT Therapeutic Exercise Minutes  40  -WS         Exercise 2    Exercise Name 2  calf raise  -WS      Cueing 2  Verbal  -WS      Reps 2  20  -WS         Exercise 3    Exercise Name 3  wall slide  -WS      Cueing 3  Verbal;Demo  -WS      Reps 3  10  -WS      Time 3  10 s  -WS      Additional Comments  5#  -WS         Exercise 4    Exercise Name 4  mini squat  -WS      Cueing 4  Verbal;Demo  -WS      Reps 4  20  -WS         Exercise 5    Exercise Name 5  TKE  -WS      Cueing 5  Verbal;Demo  -WS      Reps 5  15  -WS      Time 5  5 s  -WS      Additional Comments  HB HB  -WS         Exercise 6    Exercise Name 6  prone 5# 1 min  -WS         Exercise 7    Exercise Name 7  standing hip abd, bilateral   -WS      Cueing 7  Verbal;Demo  -WS      Reps 7  2  -WS      Time 7  10  -WS      Additional Comments  7#  -WS         Exercise 8    Exercise Name 8  standing HS curl L   -WS      Cueing 8  Verbal;Demo  -WS      Reps 8  20  -WS      Additional Comments  7#  -WS         Exercise 9    Exercise Name 9  stair stretch: HS and Quad  -WS      Cueing 9  Verbal;Demo  -WS      Reps 9  3  -WS      Time 9  20 s  -WS         Exercise 10    Exercise Name 10  SAQ  -WS      Sets 10  2  -WS      Reps 10  10  -WS      Additional Comments  10#  -WS         Exercise 11    Exercise Name 11  Nustep, L5  -WS       Time 11  5 min  -WS         Exercise 12    Exercise Name 12  Recumbent bike  -WS      Cueing 12  Verbal  -WS      Time 12  5 min  -WS      Additional Comments  8-6  -WS         Exercise 13    Exercise Name 13  LAQ   -WS      Cueing 13  Verbal;Demo  -WS      Sets 13  3  -WS      Reps 13  10  -WS      Additional Comments  10#  -WS         Exercise 14    Exercise Name 14  leg press, LLE only   -WS      Cueing 14  Verbal;Demo  -WS      Sets 14  2  -WS      Reps 14  20  -WS      Additional Comments  90#  -WS        User Key  (r) = Recorded By, (t) = Taken By, (c) = Cosigned By    Initials Name Provider Type    Domingo Sanchez PTA Physical Therapy Assistant                         PT OP Goals     Row Name 01/23/19 1100          PT Short Term Goals    STG 1  Patient will be independent and compliant with initial home exercise program  -WS     STG 1 Progress  Met  -WS     STG 2  Patient will be independent with patellar and scar tissue mobilization techniques.  -WS     STG 2 Progress  Met  -WS     STG 3  Patient will be independent with education for symptom management, joint protection and strategies to minimize stress on affected tissues  -WS     STG 3 Progress  Met  -WS     STG 4  Patient will demonstrate L knee active flexion to >/= 95 degrees  -WS     STG 4 Progress  Met  -WS     STG 5  Patient will be able to bring L leg onto/off of bed and into/out of car without use of B hands.   -     STG 5 Progress  Met  -        Long Term Goals    LTG 1  Patient will be independent and compliant with advanced home exercise program to facilitate self-management of symptoms  -WS     LTG 1 Progress  Progressing  -     LTG 2  Patient will ambulate with least restrictive assistive device with near normal gait on level ground short community distances  -WS     LTG 2 Progress  Partially Met  -WS     LTG 3  Patient will demonstrate improved L knee flexion to at least 110 degrees and full knee extension for improved AROM  and ease with ADLs.  -     LTG 3 Progress  Ongoing  -     LTG 4  Patient will negotiate stairs reciprocally with rail support as needed.  -     LTG 4 Progress  Ongoing  -     LTG 5  Patient will demonstrate 4+/5 L knee strength (flexion/extension) for improved function and ease with return to deficit free ambulation and stair negotiation.  -     LTG 5 Progress  Progressing  -       User Key  (r) = Recorded By, (t) = Taken By, (c) = Cosigned By    Initials Name Provider Type    Domingo Sanchez PTA Physical Therapy Assistant          Therapy Education  Given: HEP, Symptoms/condition management, Pain management  Program: Reinforced  How Provided: Verbal  Provided to: Patient              Time Calculation:   Start Time: 1105  Stop Time: 1155  Time Calculation (min): 50 min  Therapy Suggested Charges     Code   Minutes Charges    14230 (CPT®) Hc Pt Neuromusc Re Education Ea 15 Min      70492 (CPT®) Hc Pt Ther Proc Ea 15 Min 40 3    90183 (CPT®) Hc Gait Training Ea 15 Min      76497 (CPT®) Hc Pt Therapeutic Act Ea 15 Min      44435 (CPT®) Hc Pt Manual Therapy Ea 15 Min      48663 (CPT®) Hc Pt Ther Massage- Per 15 Min      92545 (CPT®) Hc Pt Iontophoresis Ea 15 Min      79600 (CPT®) Hc Pt Elec Stim Ea-Per 15 Min      47232 (CPT®) Hc Pt Ultrasound Ea 15 Min      17691 (CPT®) Hc Pt Self Care/Mgmt/Train Ea 15 Min      37202 (CPT®) Hc Pt Prosthetic (S) Train Initial Encounter, Each 15 Min      33832 (CPT®) Hc Orthotic(S) Mgmt/Train Initial Encounter, Each 15min      11822 (CPT®) Hc Pt Aquatic Therapy Ea 15 Min      35683 (CPT®) Hc Pt Orthotic(S)/Prosthetic(S) Encounter, Each 15 Min       (CPT®) Hc Pt Electrical Stim Unattended      Total  40 3        Therapy Charges for Today     Code Description Service Date Service Provider Modifiers Qty    23415058118 HC PT THER PROC EA 15 MIN 1/23/2019 Domingo Hill PTA GP 3    36287640903 HC PT HOT OR COLD PACK TREAT MCARE 1/23/2019 Domingo Hill PTA GP 1                     Domingo Hill, PTA  1/23/2019

## 2019-01-25 ENCOUNTER — HOSPITAL ENCOUNTER (OUTPATIENT)
Dept: PHYSICAL THERAPY | Facility: HOSPITAL | Age: 64
Setting detail: THERAPIES SERIES
Discharge: HOME | End: 2019-01-25

## 2019-01-25 DIAGNOSIS — M25.562 CHRONIC PAIN OF LEFT KNEE: ICD-10-CM

## 2019-01-25 DIAGNOSIS — Z96.652 STATUS POST LEFT KNEE REPLACEMENT: ICD-10-CM

## 2019-01-25 DIAGNOSIS — Z47.89 ORTHOPEDIC AFTERCARE: Primary | ICD-10-CM

## 2019-01-25 DIAGNOSIS — Z74.09 IMPAIRED MOBILITY AND ACTIVITIES OF DAILY LIVING: ICD-10-CM

## 2019-01-25 DIAGNOSIS — G89.29 CHRONIC PAIN OF LEFT KNEE: ICD-10-CM

## 2019-01-25 DIAGNOSIS — Z78.9 IMPAIRED MOBILITY AND ACTIVITIES OF DAILY LIVING: ICD-10-CM

## 2019-01-25 PROCEDURE — 97110 THERAPEUTIC EXERCISES: CPT

## 2019-01-28 ENCOUNTER — HOSPITAL ENCOUNTER (OUTPATIENT)
Dept: PHYSICAL THERAPY | Facility: HOSPITAL | Age: 64
Setting detail: THERAPIES SERIES
Discharge: HOME | End: 2019-01-28

## 2019-01-28 ENCOUNTER — OFFICE VISIT (OUTPATIENT)
Dept: ORTHOPEDIC SURGERY | Facility: CLINIC | Age: 64
End: 2019-01-28

## 2019-01-28 VITALS — BODY MASS INDEX: 26.6 KG/M2 | HEIGHT: 71 IN | TEMPERATURE: 97 F | WEIGHT: 190 LBS

## 2019-01-28 DIAGNOSIS — M25.562 CHRONIC PAIN OF LEFT KNEE: ICD-10-CM

## 2019-01-28 DIAGNOSIS — Z96.652 STATUS POST LEFT KNEE REPLACEMENT: ICD-10-CM

## 2019-01-28 DIAGNOSIS — Z74.09 IMPAIRED MOBILITY AND ACTIVITIES OF DAILY LIVING: ICD-10-CM

## 2019-01-28 DIAGNOSIS — Z47.89 ORTHOPEDIC AFTERCARE: Primary | ICD-10-CM

## 2019-01-28 DIAGNOSIS — Z96.652 STATUS POST TOTAL LEFT KNEE REPLACEMENT: ICD-10-CM

## 2019-01-28 DIAGNOSIS — Z09 SURGERY FOLLOW-UP: Primary | ICD-10-CM

## 2019-01-28 DIAGNOSIS — Z78.9 IMPAIRED MOBILITY AND ACTIVITIES OF DAILY LIVING: ICD-10-CM

## 2019-01-28 DIAGNOSIS — G89.29 CHRONIC PAIN OF LEFT KNEE: ICD-10-CM

## 2019-01-28 PROCEDURE — 73562 X-RAY EXAM OF KNEE 3: CPT | Performed by: ORTHOPAEDIC SURGERY

## 2019-01-28 PROCEDURE — 97110 THERAPEUTIC EXERCISES: CPT

## 2019-01-28 PROCEDURE — 99024 POSTOP FOLLOW-UP VISIT: CPT | Performed by: ORTHOPAEDIC SURGERY

## 2019-01-28 NOTE — PROGRESS NOTES
Bob Biggs . : 1955 MRN: [id number] DATE: 2019    DIAGNOSIS: 3 month follow up left TKA      SUBJECTIVE:  Patient returns today for 3 month follow up of left total knee replacement.  He says that he has turned a corner and is doing much better now.  Patient reports doing well with no unusual complaints.     Vitals:    19 1422   Temp: 97 °F (36.1 °C)       OBJECTIVE:     Exam:  The incision is well healed. No sign of infection. Range of motion is measured at 3 degrees to 110 degrees of flexion. The calf is soft and nontender with a negative Homans sign.  Gait is reciprocal heel-to-toe and nonantalgic.    DIAGNOSTIC STUDIES    Xrays: 3 views of the left knee (AP, lateral, and sunrise) were ordered and reviewed for evaluation of recent knee replacement. They demonstrate a well positioned, well aligned knee replacement without complicating factors noted. In comparison with previous films there has been no change.    ASSESSMENT: 3 months status post left knee replacement.    PLAN: 1) Continue with PT exercises as prescribed   2) Follow up in 6 months    Nato Guerin MD  2019

## 2019-01-28 NOTE — THERAPY TREATMENT NOTE
Outpatient Physical Therapy Ortho Treatment Note  [facility]     Patient Name: Bob Biggs Sr.  : 1955  MRN: [id number]  Today's Date: 2019      Visit Date: 2019    Visit Dx:    ICD-10-CM ICD-9-CM   1. Orthopedic aftercare Z47.89 V54.9   2. Impaired mobility and activities of daily living Z74.09 799.89   3. Chronic pain of left knee M25.562 719.46    G89.29 338.29   4. Status post left knee replacement Z96.652 V43.65       Patient Active Problem List   Diagnosis   • Osteoarthritis of right knee   • Status post total right knee replacement   • Diabetes (CMS/HCC)   • Arthritis   • Arthritis   • GERD (gastroesophageal reflux disease)   • Chronic pain of left knee   • Osteoarthritis of left knee   • Decreased mobility        Past Medical History:   Diagnosis Date   • Arthritis    • Diabetes mellitus (CMS/HCC)    • Finger fracture, left 2018   • GERD (gastroesophageal reflux disease)    • Painful swelling of joint    • Tear of meniscus of knee         Past Surgical History:   Procedure Laterality Date   • COLONOSCOPY     • FINGER/THUMB CLOSED REDUCTION AND PERCUTANEOUS PINNING Left 2018    Procedure: CLOSED REDUCTION AND LT. SMALL PERCUTANEOUS FINGER PINNING;  Surgeon: Raghu Porras MD;  Location: [facility];  Service: Hand   • HYDROCELECTOMY      late    • KNEE ARTHROSCOPY Left 2011   • KNEE SURGERY  2016    Right TKA   • TOTAL KNEE ARTHROPLASTY Left 2018    Procedure: TOTAL KNEE ARTHROPLASTY;  Surgeon: Nato Guerin MD;  Location: [facility];  Service: Orthopedics                       PT Assessment/Plan     Row Name 19 5725          PT Assessment    Assessment Comments  Patient discherged to home management. RTW Feb 10.   -AVINASH       User Key  (r) = Recorded By, (t) = Taken By, (c) = Cosigned By    Initials Name Provider Type    Domingo Sanchez PTA Physical Therapy Assistant              Exercises     Row Name 19 0311              Subjective Comments    Subjective Comments  Last day for therapy  -WS         Subjective Pain    Able to rate subjective pain?  yes  -WS      Pre-Treatment Pain Level  4  -WS         Total Minutes    65997 - PT Therapeutic Exercise Minutes  40  -WS         Exercise 2    Exercise Name 2  calf raise  -WS      Cueing 2  Verbal  -WS      Reps 2  20  -WS         Exercise 3    Exercise Name 3  wall slide  -WS      Cueing 3  Verbal;Demo  -WS      Reps 3  10  -WS      Time 3  10 s  -WS      Additional Comments  5#  -WS         Exercise 4    Exercise Name 4  mini squat  -WS      Cueing 4  Verbal;Demo  -WS      Reps 4  20  -WS         Exercise 5    Exercise Name 5  TKE  -WS      Cueing 5  Verbal;Demo  -WS      Reps 5  15  -WS      Time 5  5 s  -WS      Additional Comments  HB  -WS         Exercise 6    Exercise Name 6  prone 5# 1 min  -WS         Exercise 7    Exercise Name 7  standing hip abd, bilateral   -WS      Reps 7  2  -WS      Time 7  10  -WS      Additional Comments  7#  -WS         Exercise 8    Exercise Name 8  standing HS curl L   -WS      Cueing 8  Verbal;Demo  -WS      Reps 8  20  -WS      Additional Comments  7#  -WS         Exercise 9    Exercise Name 9  stair stretch: HS and Quad  -WS      Cueing 9  Verbal;Demo  -WS      Reps 9  3  -WS      Time 9  20 s  -WS         Exercise 10    Exercise Name 10  SAQ  -WS      Cueing 10  Verbal;Demo  -WS      Sets 10  2  -WS      Reps 10  10  -WS      Additional Comments  10#  -WS         Exercise 11    Exercise Name 11  Nustep, L5  -WS      Time 11  5 min  -WS         Exercise 12    Exercise Name 12  Recumbent bike  -WS      Cueing 12  Verbal  -WS      Time 12  5 min  -WS      Additional Comments  7-6-5  -WS         Exercise 13    Exercise Name 13  LAQ   -WS      Cueing 13  Verbal;Demo  -WS      Sets 13  2  -WS      Reps 13  10  -WS      Additional Comments  10#  -WS         Exercise 14    Exercise Name 14  leg press, LLE only   -WS      Cueing 14  Verbal;Demo  -WS       Sets 14  2  -WS      Reps 14  20  -WS      Additional Comments  90#  -        User Key  (r) = Recorded By, (t) = Taken By, (c) = Cosigned By    Initials Name Provider Type    Domingo Sanchez PTA Physical Therapy Assistant                         PT OP Goals     Row Name 01/28/19 1300          PT Short Term Goals    STG 1  Patient will be independent and compliant with initial home exercise program  -WS     STG 1 Progress  Met  -WS     STG 2  Patient will be independent with patellar and scar tissue mobilization techniques.  -     STG 2 Progress  Met  -WS     STG 3  Patient will be independent with education for symptom management, joint protection and strategies to minimize stress on affected tissues  -WS     STG 3 Progress  Met  -WS     STG 4  Patient will demonstrate L knee active flexion to >/= 95 degrees  -     STG 4 Progress  Met  -WS     STG 5  Patient will be able to bring L leg onto/off of bed and into/out of car without use of B hands.   -     STG 5 Progress  Met  -        Long Term Goals    LTG 1  Patient will be independent and compliant with advanced home exercise program to facilitate self-management of symptoms  -     LTG 1 Progress  Met  -WS     LTG 2  Patient will ambulate with least restrictive assistive device with near normal gait on level ground short community distances  -     LTG 2 Progress  Met  -WS     LTG 3  Patient will demonstrate improved L knee flexion to at least 110 degrees and full knee extension for improved AROM and ease with ADLs.  -     LTG 3 Progress  Partially Met  -     LTG 4  Patient will negotiate stairs reciprocally with rail support as needed.  -     LTG 4 Progress  Met  -     LTG 5  Patient will demonstrate 4+/5 L knee strength (flexion/extension) for improved function and ease with return to deficit free ambulation and stair negotiation.  -     LTG 5 Progress  Met  -       User Key  (r) = Recorded By, (t) = Taken By, (c) = Cosigned By     Initials Name Provider Type     Domingo Hill PTA Physical Therapy Assistant          Therapy Education  Given: Symptoms/condition management, HEP, Edema management, Pain management  Program: Reinforced  How Provided: Verbal  Provided to: Patient              Time Calculation:   Start Time: 1250  Stop Time: 1340  Time Calculation (min): 50 min  Therapy Suggested Charges     Code   Minutes Charges    19492 (CPT®) Hc Pt Neuromusc Re Education Ea 15 Min      83473 (CPT®) Hc Pt Ther Proc Ea 15 Min 40 3    05875 (CPT®) Hc Gait Training Ea 15 Min      76570 (CPT®) Hc Pt Therapeutic Act Ea 15 Min      95018 (CPT®) Hc Pt Manual Therapy Ea 15 Min      90050 (CPT®) Hc Pt Ther Massage- Per 15 Min      83782 (CPT®) Hc Pt Iontophoresis Ea 15 Min      41062 (CPT®) Hc Pt Elec Stim Ea-Per 15 Min      11927 (CPT®) Hc Pt Ultrasound Ea 15 Min      00432 (CPT®) Hc Pt Self Care/Mgmt/Train Ea 15 Min      09388 (CPT®) Hc Pt Prosthetic (S) Train Initial Encounter, Each 15 Min      72899 (CPT®) Hc Orthotic(S) Mgmt/Train Initial Encounter, Each 15min      01666 (CPT®) Hc Pt Aquatic Therapy Ea 15 Min      98043 (CPT®) Hc Pt Orthotic(S)/Prosthetic(S) Encounter, Each 15 Min       (CPT®) Hc Pt Electrical Stim Unattended      Total  40 3        Therapy Charges for Today     Code Description Service Date Service Provider Modifiers Qty    54715957426 HC PT THER PROC EA 15 MIN 1/28/2019 Domingo Hill PTA GP 3    49040431435 HC PT HOT OR COLD PACK TREAT MCARE 1/28/2019 Domingo Hill PTA GP 1                    Domingo Hill PTA  1/28/2019

## 2019-01-29 DIAGNOSIS — R10.84 GENERALIZED ABDOMINAL PAIN: ICD-10-CM

## 2019-01-29 RX ORDER — PANTOPRAZOLE SODIUM 40 MG/1
TABLET, DELAYED RELEASE ORAL
Qty: 60 TABLET | Refills: 0 | Status: SHIPPED | OUTPATIENT
Start: 2019-01-29 | End: 2019-02-15 | Stop reason: SDUPTHER

## 2019-01-31 NOTE — THERAPY TREATMENT NOTE
Outpatient Physical Therapy Ortho Treatment Note  [facility]     Patient Name: Bob Biggs Sr.  : 1955  MRN: [id number]  Today's Date: 2019      Visit Date: 2019    Visit Dx:    ICD-10-CM ICD-9-CM   1. Orthopedic aftercare Z47.89 V54.9   2. Impaired mobility and activities of daily living Z74.09 799.89   3. Chronic pain of left knee M25.562 719.46    G89.29 338.29   4. Status post left knee replacement Z96.652 V43.65       Patient Active Problem List   Diagnosis   • Osteoarthritis of right knee   • Status post total right knee replacement   • Diabetes (CMS/HCC)   • Arthritis   • Arthritis   • GERD (gastroesophageal reflux disease)   • Chronic pain of left knee   • Osteoarthritis of left knee   • Decreased mobility        Past Medical History:   Diagnosis Date   • Arthritis    • Diabetes mellitus (CMS/HCC)    • Finger fracture, left 2018   • GERD (gastroesophageal reflux disease)    • Painful swelling of joint    • Tear of meniscus of knee         Past Surgical History:   Procedure Laterality Date   • COLONOSCOPY     • FINGER/THUMB CLOSED REDUCTION AND PERCUTANEOUS PINNING Left 2018    Procedure: CLOSED REDUCTION AND LT. SMALL PERCUTANEOUS FINGER PINNING;  Surgeon: Raghu Porras MD;  Location: [facility];  Service: Hand   • HYDROCELECTOMY      late    • KNEE ARTHROSCOPY Left 2011   • KNEE SURGERY  2016    Right TKA   • TOTAL KNEE ARTHROPLASTY Left 2018    Procedure: TOTAL KNEE ARTHROPLASTY;  Surgeon: Nato Guerin MD;  Location: [facility];  Service: Orthopedics                                                                     Time Calculation:      Therapy Suggested Charges     Code   Minutes Charges    None                         Domingo Hill PTA  2019

## 2019-02-15 ENCOUNTER — TELEPHONE (OUTPATIENT)
Dept: INTERNAL MEDICINE | Facility: CLINIC | Age: 64
End: 2019-02-15

## 2019-02-15 DIAGNOSIS — R10.84 GENERALIZED ABDOMINAL PAIN: ICD-10-CM

## 2019-02-15 RX ORDER — PANTOPRAZOLE SODIUM 40 MG/1
40 TABLET, DELAYED RELEASE ORAL 2 TIMES DAILY
Qty: 60 TABLET | Refills: 0 | Status: SHIPPED | OUTPATIENT
Start: 2019-02-15 | End: 2019-02-18 | Stop reason: SDUPTHER

## 2019-02-15 NOTE — TELEPHONE ENCOUNTER
----- Message from Nirmala Jackson sent at 2/15/2019  8:39 AM EST -----  Contact: Pt   Pt states he got a refill for,   pantoprazole (PROTONIX) 40 MG EC tablet   He takes two a day, states it was only called in for 30 days,   he needs 90 with refills, as he only comes into the office for a physical once a year.    Would like this resubmitted for next refill.     [facility] [address] [place] [address] AT [address] [place] [address] - [phone] [place] [phone] FX       Pt#[phone]

## 2019-02-15 NOTE — TELEPHONE ENCOUNTER
Called pt, no answer lvm to inform him that since  out every covering provider is different and per our protocol here we can do 6 mos supply. Last refill was sent for 60 tabs not 30 otherwise he needs to check with his pharmacy, informed him that he needs to keep his yeimi with NP for cpe and will discuss that further when we see him but i'm sending month supply 60 tabs to his pharmacy. Advised to call us back for any more questions.

## 2019-02-18 DIAGNOSIS — R10.84 GENERALIZED ABDOMINAL PAIN: ICD-10-CM

## 2019-02-18 RX ORDER — PANTOPRAZOLE SODIUM 40 MG/1
40 TABLET, DELAYED RELEASE ORAL 2 TIMES DAILY
Qty: 180 TABLET | Refills: 0 | Status: SHIPPED | OUTPATIENT
Start: 2019-02-18 | End: 2019-04-08 | Stop reason: SDUPTHER

## 2019-02-18 NOTE — TELEPHONE ENCOUNTER
Pt called today requested 90 days supply because it's cheaper than one month supply. Med sent per his request.

## 2019-04-04 ENCOUNTER — DOCUMENTATION (OUTPATIENT)
Dept: PHYSICAL THERAPY | Facility: HOSPITAL | Age: 64
End: 2019-04-04

## 2019-04-04 DIAGNOSIS — G89.29 CHRONIC PAIN OF LEFT KNEE: ICD-10-CM

## 2019-04-04 DIAGNOSIS — Z47.89 ORTHOPEDIC AFTERCARE: Primary | ICD-10-CM

## 2019-04-04 DIAGNOSIS — M25.562 CHRONIC PAIN OF LEFT KNEE: ICD-10-CM

## 2019-04-04 DIAGNOSIS — Z96.652 STATUS POST LEFT KNEE REPLACEMENT: ICD-10-CM

## 2019-04-04 DIAGNOSIS — Z78.9 IMPAIRED MOBILITY AND ACTIVITIES OF DAILY LIVING: ICD-10-CM

## 2019-04-04 DIAGNOSIS — Z74.09 IMPAIRED MOBILITY AND ACTIVITIES OF DAILY LIVING: ICD-10-CM

## 2019-04-04 NOTE — THERAPY DISCHARGE NOTE
Outpatient Physical Therapy Discharge Summary         Patient Name: Bob Biggs Sr.  : 1955  MRN: [id number]    Today's Date: 2019    Visit Dx:    ICD-10-CM ICD-9-CM   1. Orthopedic aftercare Z47.89 V54.9   2. Impaired mobility and activities of daily living Z74.09 799.89   3. Chronic pain of left knee M25.562 719.46    G89.29 338.29   4. Status post left knee replacement Z96.652 V43.65       PT OP Goals     Row Name 19 1100          PT Short Term Goals    STG 1  Patient will be independent and compliant with initial home exercise program  -GJ     STG 1 Progress  Met  -GJ     STG 2  Patient will be independent with patellar and scar tissue mobilization techniques.  -GJ     STG 2 Progress  Met  -GJ     STG 3  Patient will be independent with education for symptom management, joint protection and strategies to minimize stress on affected tissues  -GJ     STG 3 Progress  Met  -GJ     STG 4  Patient will demonstrate L knee active flexion to >/= 95 degrees  -GJ     STG 4 Progress  Met  -GJ     STG 5  Patient will be able to bring L leg onto/off of bed and into/out of car without use of B hands.   -GJ     STG 5 Progress  Met  -GJ        Long Term Goals    LTG 1  Patient will be independent and compliant with advanced home exercise program to facilitate self-management of symptoms  -GJ     LTG 1 Progress  Met  -GJ     LTG 2  Patient will ambulate with least restrictive assistive device with near normal gait on level ground short community distances  -GJ     LTG 2 Progress  Met  -GJ     LTG 3  Patient will demonstrate improved L knee flexion to at least 110 degrees and full knee extension for improved AROM and ease with ADLs.  -GJ     LTG 3 Progress  Partially Met  -GJ     LTG 4  Patient will negotiate stairs reciprocally with rail support as needed.  -GJ     LTG 4 Progress  Met  -GJ     LTG 5  Patient will demonstrate 4+/5 L knee strength (flexion/extension) for improved function and ease with  return to deficit free ambulation and stair negotiation.  -JOSETTE     LTG 5 Progress  Met  -JOSETTE       User Key  (r) = Recorded By, (t) = Taken By, (c) = Cosigned By    Initials Name Provider Type    Felix Roy, PT Physical Therapist          OP PT Discharge Summary  Date of Discharge: 04/04/19  Reason for Discharge: Maximum functional potential achieved, Independent, All goals achieved  Outcomes Achieved: Able to achieve all goals within established timeline  Discharge Destination: Home with home program  Discharge Instructions/Additional Comments: Mr. Biggs attended 26 sessions of physical therapy from 11/27/18-1/28/19 following his L TKA.  He is independent with his HEP, verblizes a good understanding of his condition, is ambulating withotu AD and has met or partially met all of his functional goals. Mr. Biggs is dishcarged from outpatient physical therapy to an independent program.       Time Calculation:                    Felix Villa, PT  4/4/2019

## 2019-04-05 ENCOUNTER — TELEPHONE (OUTPATIENT)
Dept: INTERNAL MEDICINE | Facility: CLINIC | Age: 64
End: 2019-04-05

## 2019-04-05 NOTE — TELEPHONE ENCOUNTER
----- Message from Isis Mendoza sent at 4/5/2019  8:18 AM EDT -----  Contact: Patient  Patient called stating he has had URI symptoms x3 days with cough productive of green sputum,, sore throat, and diarrhea  States he is taking cough medicine and Mucinex  Says he gets these symptoms about this time every year   Would like antibiotic.  He has CPE scheduled with Leona on Monday, and since he has not seen Leona since 2016 I asked him to come into acute clinic but he says he cannot due to work.  Please advise.    Patient:  [phone] - cell; can [facility]    Pharmacy:  [facility] [address] [place] [address] AT [address] [place] [address] - [phone] [facility] [phone] FX

## 2019-04-05 NOTE — TELEPHONE ENCOUNTER
Pt informed, states he knows his body and he needs an antibiotic, but okay, will see us Monday.  Has not been on any planes.

## 2019-04-05 NOTE — TELEPHONE ENCOUNTER
Can address on Monday. Symptoms may be viral with just three days. Make sure he is taking mucinex 600 mg bid, may increase to 1200 mg bid. Has he had any air travel. Be sure to drink plenty of water.

## 2019-04-08 ENCOUNTER — OFFICE VISIT (OUTPATIENT)
Dept: INTERNAL MEDICINE | Facility: CLINIC | Age: 64
End: 2019-04-08

## 2019-04-08 ENCOUNTER — APPOINTMENT (OUTPATIENT)
Dept: WOMENS IMAGING | Facility: HOSPITAL | Age: 64
End: 2019-04-08

## 2019-04-08 VITALS
HEART RATE: 88 BPM | OXYGEN SATURATION: 98 % | WEIGHT: 203 LBS | BODY MASS INDEX: 28.42 KG/M2 | SYSTOLIC BLOOD PRESSURE: 130 MMHG | TEMPERATURE: 98.7 F | HEIGHT: 71 IN | RESPIRATION RATE: 18 BRPM | DIASTOLIC BLOOD PRESSURE: 82 MMHG

## 2019-04-08 DIAGNOSIS — J06.9 ACUTE URI: ICD-10-CM

## 2019-04-08 DIAGNOSIS — Z12.11 COLON CANCER SCREENING: ICD-10-CM

## 2019-04-08 DIAGNOSIS — J40 BRONCHITIS: ICD-10-CM

## 2019-04-08 DIAGNOSIS — Z00.00 ANNUAL PHYSICAL EXAM: Primary | ICD-10-CM

## 2019-04-08 DIAGNOSIS — Z12.5 PROSTATE CANCER SCREENING: ICD-10-CM

## 2019-04-08 DIAGNOSIS — IMO0001 UNCONTROLLED TYPE 2 DIABETES MELLITUS WITHOUT COMPLICATION, WITHOUT LONG-TERM CURRENT USE OF INSULIN: ICD-10-CM

## 2019-04-08 DIAGNOSIS — I10 ESSENTIAL HYPERTENSION: ICD-10-CM

## 2019-04-08 LAB
ALBUMIN SERPL-MCNC: 4.2 G/DL (ref 3.5–5.2)
ALBUMIN UR-MCNC: 4.3 MG/L
ALBUMIN/GLOB SERPL: 1.2 G/DL
ALP SERPL-CCNC: 62 U/L (ref 39–117)
ALT SERPL W P-5'-P-CCNC: 19 U/L (ref 1–41)
ANION GAP SERPL CALCULATED.3IONS-SCNC: 12.3 MMOL/L
AST SERPL-CCNC: 14 U/L (ref 1–40)
BILIRUB SERPL-MCNC: 0.3 MG/DL (ref 0.2–1.2)
BUN BLD-MCNC: 14 MG/DL (ref 8–23)
BUN/CREAT SERPL: 17.3 (ref 7–25)
CALCIUM SPEC-SCNC: 9.4 MG/DL (ref 8.6–10.5)
CHLORIDE SERPL-SCNC: 103 MMOL/L (ref 98–107)
CHOLEST SERPL-MCNC: 165 MG/DL (ref 0–200)
CO2 SERPL-SCNC: 23.7 MMOL/L (ref 22–29)
CREAT BLD-MCNC: 0.81 MG/DL (ref 0.76–1.27)
CREAT UR-MCNC: 239.8 MG/DL
DEPRECATED RDW RBC AUTO: 49.1 FL (ref 37–54)
ERYTHROCYTE [DISTWIDTH] IN BLOOD BY AUTOMATED COUNT: 15.4 % (ref 12.3–15.4)
GFR SERPL CREATININE-BSD FRML MDRD: 116 ML/MIN/1.73
GLOBULIN UR ELPH-MCNC: 3.6 GM/DL
GLUCOSE BLD-MCNC: 187 MG/DL (ref 65–99)
HBA1C MFR BLD: 7.3 % (ref 4.8–5.6)
HCT VFR BLD AUTO: 41.2 % (ref 37.5–51)
HDLC SERPL-MCNC: 38 MG/DL (ref 40–60)
HGB BLD-MCNC: 13.8 G/DL (ref 13–17.7)
LDLC SERPL CALC-MCNC: 115 MG/DL (ref 0–100)
LDLC/HDLC SERPL: 3.02 {RATIO}
MCH RBC QN AUTO: 29.4 PG (ref 26.6–33)
MCHC RBC AUTO-ENTMCNC: 33.5 G/DL (ref 31.5–35.7)
MCV RBC AUTO: 87.8 FL (ref 79–97)
MICROALBUMIN/CREAT UR: 17.9 MG/G
PLATELET # BLD AUTO: 237 10*3/MM3 (ref 140–450)
PMV BLD AUTO: 11.1 FL (ref 6–12)
POTASSIUM BLD-SCNC: 4.2 MMOL/L (ref 3.5–5.2)
PROT SERPL-MCNC: 7.8 G/DL (ref 6–8.5)
RBC # BLD AUTO: 4.69 10*6/MM3 (ref 4.14–5.8)
SODIUM BLD-SCNC: 139 MMOL/L (ref 136–145)
TRIGL SERPL-MCNC: 61 MG/DL (ref 0–150)
VLDLC SERPL-MCNC: 12.2 MG/DL (ref 5–40)
WBC NRBC COR # BLD: 8.19 10*3/MM3 (ref 3.4–10.8)

## 2019-04-08 PROCEDURE — 82043 UR ALBUMIN QUANTITATIVE: CPT | Performed by: NURSE PRACTITIONER

## 2019-04-08 PROCEDURE — 93000 ELECTROCARDIOGRAM COMPLETE: CPT | Performed by: NURSE PRACTITIONER

## 2019-04-08 PROCEDURE — 71046 X-RAY EXAM CHEST 2 VIEWS: CPT | Performed by: NURSE PRACTITIONER

## 2019-04-08 PROCEDURE — 71046 X-RAY EXAM CHEST 2 VIEWS: CPT | Performed by: RADIOLOGY

## 2019-04-08 PROCEDURE — 85027 COMPLETE CBC AUTOMATED: CPT | Performed by: NURSE PRACTITIONER

## 2019-04-08 PROCEDURE — 80053 COMPREHEN METABOLIC PANEL: CPT | Performed by: NURSE PRACTITIONER

## 2019-04-08 PROCEDURE — 82570 ASSAY OF URINE CREATININE: CPT | Performed by: NURSE PRACTITIONER

## 2019-04-08 PROCEDURE — 99396 PREV VISIT EST AGE 40-64: CPT | Performed by: NURSE PRACTITIONER

## 2019-04-08 PROCEDURE — 80061 LIPID PANEL: CPT | Performed by: NURSE PRACTITIONER

## 2019-04-08 RX ORDER — AZITHROMYCIN 250 MG/1
TABLET, FILM COATED ORAL
Qty: 6 TABLET | Refills: 0 | Status: SHIPPED | OUTPATIENT
Start: 2019-04-08 | End: 2020-01-21

## 2019-04-08 RX ORDER — FLUTICASONE PROPIONATE 50 MCG
2 SPRAY, SUSPENSION (ML) NASAL DAILY
Qty: 1 BOTTLE | Refills: 1
Start: 2019-04-08 | End: 2019-05-08

## 2019-04-08 RX ORDER — BENZONATATE 200 MG/1
200 CAPSULE ORAL 3 TIMES DAILY PRN
Qty: 30 CAPSULE | Refills: 0 | Status: SHIPPED | OUTPATIENT
Start: 2019-04-08 | End: 2020-01-21

## 2019-04-08 NOTE — PROGRESS NOTES
Subjective   Bob Biggs Sr. is a 64 y.o. male.     .Well Adult Physical   Patient here for a comprehensive physical exam.The patient reports problems - uri       Do you take any herbs or supplements that were not prescribed by a doctor? no   Are you taking calcium supplements? no   Are you taking aspirin daily? no     History:  Patient receives prostate care outside our clinic  Date last prostate exam: 4/2018  Date last PSA: 4/2018    He works full time in a factory. He walks daily about 20,000 steps daily. He is up to date with diabetes eye exam. He does monitor blood sugar periodically and fasting readings 120-130. He does not monitor blood pressure. He denies any hypoglycemic events.       He has not had recent air travel. He was around a sick family member last week. He did receive his flu shot.       URI    This is a new problem. The current episode started in the past 7 days. The problem has been gradually worsening. There has been no fever (felt feverish ). Associated symptoms include congestion, coughing (productive ), ear pain (mild, left ear ), headaches (left sinus ), rhinorrhea, sinus pain, sneezing and a sore throat (swallowing). Pertinent negatives include no abdominal pain, chest pain, diarrhea, nausea, neck pain, rash, vomiting or wheezing (a little wheezing ). Associated symptoms comments: Post nasal drainage . Treatments tried: mucinex, tylenol, cough syrup, throat lozenges.  The treatment provided moderate relief.        The following portions of the patient's history were reviewed and updated as appropriate: allergies, current medications, past family history, past medical history, past social history, past surgical history and problem list.    Review of Systems   Constitutional: Negative for activity change, appetite change, chills, fatigue, fever and unexpected weight change.   HENT: Positive for congestion, ear pain (mild, left ear ), postnasal drip, rhinorrhea, sinus pressure, sinus  pain, sneezing, sore throat (swallowing) and tinnitus (intermittent, resolved ). Negative for ear discharge, hearing loss, mouth sores, nosebleeds and voice change.    Eyes: Positive for visual disturbance (wears glasses, his last eye exam was about 2 weeks ago).   Respiratory: Positive for cough (productive ). Negative for chest tightness, shortness of breath and wheezing (a little wheezing ).         Coughed up some blood yesterday with coughing    Cardiovascular: Negative for chest pain, palpitations and leg swelling.   Gastrointestinal: Negative for abdominal distention, abdominal pain, anal bleeding, blood in stool, constipation, diarrhea, nausea and vomiting.   Endocrine: Negative for cold intolerance, heat intolerance, polydipsia, polyphagia and polyuria.   Genitourinary: Negative for difficulty urinating, discharge, frequency, hematuria, penile pain, penile swelling, scrotal swelling, testicular pain and urgency.   Musculoskeletal: Negative for arthralgias (left knee surgery 11/2018), back pain, gait problem, joint swelling, myalgias, neck pain and neck stiffness.   Skin: Negative for color change, pallor and rash.        NEGATIVE BREAST MASS, BREAST PAIN, NIPPLE DISCHARGE, SKIN CHANGES   Allergic/Immunologic: Positive for environmental allergies.   Neurological: Positive for headaches (left sinus ). Negative for dizziness, tremors, seizures, syncope, speech difficulty, weakness, light-headedness and numbness.   Hematological: Negative for adenopathy. Does not bruise/bleed easily.   Psychiatric/Behavioral: Negative for confusion, decreased concentration, dysphoric mood, sleep disturbance and suicidal ideas. The patient is not nervous/anxious.        Objective     Physical Exam   Constitutional: He is oriented to person, place, and time. He appears well-developed.   HENT:   Head: Normocephalic.   Right Ear: External ear normal.   Left Ear: External ear normal.   Nose: Right sinus exhibits maxillary sinus  tenderness and frontal sinus tenderness. Left sinus exhibits maxillary sinus tenderness and frontal sinus tenderness.   Mouth/Throat: Uvula is midline. No posterior oropharyngeal erythema. Posterior oropharyngeal edema: with clear hypersecretions    Eyes: Conjunctivae, EOM and lids are normal. Pupils are equal, round, and reactive to light.   Neck: Normal range of motion. Carotid bruit is not present. No tracheal deviation present. No thyroid mass and no thyromegaly present.   Cardiovascular: Normal rate, regular rhythm, normal heart sounds, intact distal pulses and normal pulses. Exam reveals no gallop and no friction rub.   No murmur heard.  Repeat bp left arm 142/88  No pedal edema    Pulmonary/Chest: No respiratory distress. He has no wheezes. He has no rales. He exhibits no tenderness.   Moist cough   Abdominal: Soft. Bowel sounds are normal. He exhibits no distension. There is no tenderness. No hernia. Hernia confirmed negative in the right inguinal area and confirmed negative in the left inguinal area.   Genitourinary: Rectum normal, prostate normal, testes normal and penis normal. Rectal exam shows guaiac negative stool. Prostate is not enlarged.   Musculoskeletal: He exhibits no edema or deformity.        Lumbar back: He exhibits decreased range of motion. He exhibits no tenderness.   Crepitus in left knee  (-) VLADIMIR Rojas had a diabetic foot exam performed today.   During the foot exam he had a monofilament test performed.  Skin Integrity  -  His right foot skin is intact.  He has non-callous right foot.His left foot skin is intact. He has non-callous left foot..  Lymphadenopathy:        Head (right side): No submental, no submandibular, no tonsillar, no preauricular, no posterior auricular and no occipital adenopathy present.        Head (left side): No submental, no submandibular, no tonsillar, no preauricular, no posterior auricular and no occipital adenopathy present.     He has no cervical  adenopathy. No inguinal adenopathy noted on the right or left side.   Neurological: He is alert and oriented to person, place, and time. He displays normal reflexes. No cranial nerve deficit. He exhibits normal muscle tone. Coordination normal.   Reflex Scores:       Brachioradialis reflexes are 2+ on the right side and 2+ on the left side.       Patellar reflexes are 2+ on the right side and 2+ on the left side.  Skin: Skin is warm. No erythema. No pallor.   Psychiatric: He has a normal mood and affect. His speech is normal and behavior is normal. Judgment and thought content normal. Cognition and memory are normal.       Assessment/Plan   Bob was seen today for annual exam and uri.    Diagnoses and all orders for this visit:    Annual physical exam  -     Comprehensive Metabolic Panel; Future  -     Lipid Panel; Future  -     CBC (No Diff); Future  -     TSH Rfx On Abnormal To Free T4; Future  -     ECG 12 Lead  -     Comprehensive Metabolic Panel  -     Lipid Panel  -     CBC (No Diff)  -     TSH Rfx On Abnormal To Free T4    Uncontrolled type 2 diabetes mellitus without complication, without long-term current use of insulin (CMS/[facility])  Comments:  needs low carb/sugar diet and exercise  Orders:  -     Hemoglobin A1c; Future  -     Microalbumin / Creatinine Urine Ratio - Urine, Clean Catch; Future  -     Hemoglobin A1c  -     Microalbumin / Creatinine Urine Ratio - Urine, Clean Catch    Essential hypertension  Comments:  stable     Colon cancer screening  -     Ambulatory Referral to Gastroenterology    Prostate cancer screening  -     PSA Screen; Future  -     PSA Screen    Bronchitis  -     XR Chest 2 View  -     azithromycin (ZITHROMAX Z-CATHY) 250 MG tablet; Take 2 tablets the first day, then 1 tablet daily for 4 days.  -     benzonatate (TESSALON) 200 MG capsule; Take 1 capsule by mouth 3 (Three) Times a Day As Needed for Cough.    Acute URI  -     fluticasone (FLONASE) 50 MCG/ACT nasal spray; 2 sprays into  the nostril(s) as directed by provider Daily for 30 days. Administer 2 sprays in each nostril for each dose.        ECG 12 Lead  Date/Time: 4/8/2019 10:15 AM  Performed by: Leona Owens APRN  Authorized by: Leona Owens APRN   Comparison: compared with previous ECG   Similar to previous ECG  Rhythm: sinus rhythm  Rate: normal  Conduction: conduction normal  T inversion: V4, V5 and V6  QRS axis: normal    Clinical impression: non-specific ECG  Comments: No changes from prior tracing         CXR with no acute findings. He has been advised to return to clinic if any worsening of symptoms. 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    He has been advised to monitor blood pressure at home with bp goal of less than 130/80  I will complete hs work physical form for him

## 2019-04-08 NOTE — PATIENT INSTRUCTIONS
Health Maintenance, Male  A healthy lifestyle and preventive care is important for your health and wellness. Ask your health care provider about what schedule of regular examinations is right for you.  What should I know about weight and diet?  Eat a Healthy Diet  · Eat plenty of vegetables, fruits, whole grains, low-fat dairy products, and lean protein.  · Do not eat a lot of foods high in solid fats, added sugars, or salt.    Maintain a Healthy Weight  Regular exercise can help you achieve or maintain a healthy weight. You should:  · Do at least 150 minutes of exercise each week. The exercise should increase your heart rate and make you sweat (moderate-intensity exercise).  · Do strength-training exercises at least twice a week.    Watch Your Levels of Cholesterol and Blood Lipids  · Have your blood tested for lipids and cholesterol every 5 years starting at 35 years of age. If you are at high risk for heart disease, you should start having your blood tested when you are 20 years old. You may need to have your cholesterol levels checked more often if:  ? Your lipid or cholesterol levels are high.  ? You are older than 50 years of age.  ? You are at high risk for heart disease.    What should I know about cancer screening?  Many types of cancers can be detected early and may often be prevented.  Lung Cancer  · You should be screened every year for lung cancer if:  ? You are a current smoker who has smoked for at least 30 years.  ? You are a former smoker who has quit within the past 15 years.  · Talk to your health care provider about your screening options, when you should start screening, and how often you should be screened.    Colorectal Cancer  · Routine colorectal cancer screening usually begins at 50 years of age and should be repeated every 5-10 years until you are 75 years old. You may need to be screened more often if early forms of precancerous polyps or small growths are found. Your health care provider  may recommend screening at an earlier age if you have risk factors for colon cancer.  · Your health care provider may recommend using home test kits to check for hidden blood in the stool.  · A small camera at the end of a tube can be used to examine your colon (sigmoidoscopy or colonoscopy). This checks for the earliest forms of colorectal cancer.    Prostate and Testicular Cancer  · Depending on your age and overall health, your health care provider may do certain tests to screen for prostate and testicular cancer.  · Talk to your health care provider about any symptoms or concerns you have about testicular or prostate cancer.    Skin Cancer  · Check your skin from head to toe regularly.  · Tell your health care provider about any new moles or changes in moles, especially if:  ? There is a change in a mole’s size, shape, or color.  ? You have a mole that is larger than a pencil eraser.  · Always use sunscreen. Apply sunscreen liberally and repeat throughout the day.  · Protect yourself by wearing long sleeves, pants, a wide-brimmed hat, and sunglasses when outside.    What should I know about heart disease, diabetes, and high blood pressure?  · If you are 18-39 years of age, have your blood pressure checked every 3-5 years. If you are 40 years of age or older, have your blood pressure checked every year. You should have your blood pressure measured twice--once when you are at a hospital or clinic, and once when you are not at a hospital or clinic. Record the average of the two measurements. To check your blood pressure when you are not at a hospital or clinic, you can use:  ? An automated blood pressure machine at a pharmacy.  ? A home blood pressure monitor.  · Talk to your health care provider about your target blood pressure.  · If you are between 45-79 years old, ask your health care provider if you should take aspirin to prevent heart disease.  · Have regular diabetes screenings by checking your fasting blood  sugar level.  ? If you are at a normal weight and have a low risk for diabetes, have this test once every three years after the age of 45.  ? If you are overweight and have a high risk for diabetes, consider being tested at a younger age or more often.  · A one-time screening for abdominal aortic aneurysm (AAA) by ultrasound is recommended for men aged 65-75 years who are current or former smokers.  What should I know about preventing infection?  Hepatitis B  If you have a higher risk for hepatitis B, you should be screened for this virus. Talk with your health care provider to find out if you are at risk for hepatitis B infection.  Hepatitis C  Blood testing is recommended for:  · Everyone born from 1945 through 1965.  · Anyone with known risk factors for hepatitis C.    Sexually Transmitted Diseases (STDs)  · You should be screened each year for STDs including gonorrhea and chlamydia if:  ? You are sexually active and are younger than 24 years of age.  ? You are older than 24 years of age and your health care provider tells you that you are at risk for this type of infection.  ? Your sexual activity has changed since you were last screened and you are at an increased risk for chlamydia or gonorrhea. Ask your health care provider if you are at risk.  · Talk with your health care provider about whether you are at high risk of being infected with HIV. Your health care provider may recommend a prescription medicine to help prevent HIV infection.    What else can I do?  · Schedule regular health, dental, and eye exams.  · Stay current with your vaccines (immunizations).  · Do not use any tobacco products, such as cigarettes, chewing tobacco, and e-cigarettes. If you need help quitting, ask your health care provider.  · Limit alcohol intake to no more than 2 drinks per day. One drink equals 12 ounces of beer, 5 ounces of wine, or 1½ ounces of hard liquor.  · Do not use street drugs.  · Do not share needles.  · Ask your  health care provider for help if you need support or information about quitting drugs.  · Tell your health care provider if you often feel depressed.  · Tell your health care provider if you have ever been abused or do not feel safe at home.  This information is not intended to replace advice given to you by your health care provider. Make sure you discuss any questions you have with your health care provider.  Document Released: 06/15/2009 Document Revised: 08/16/2017 Document Reviewed: 09/20/2016  [employer] Patient Education © 2019 [employer]

## 2019-04-09 ENCOUNTER — TELEPHONE (OUTPATIENT)
Dept: INTERNAL MEDICINE | Facility: CLINIC | Age: 64
End: 2019-04-09

## 2019-04-09 LAB
PSA SERPL-MCNC: 0.91 NG/ML (ref 0–4)
T4 FREE SERPL-MCNC: NORMAL NG/DL
TSH SERPL-ACNC: 1.68 MIU/ML (ref 0.27–4.2)

## 2019-04-09 NOTE — TELEPHONE ENCOUNTER
I have not heard back from pt, but I faxed the form to the number given on Biometric Screening Form.

## 2019-04-09 NOTE — TELEPHONE ENCOUNTER
Confirmation of fax received, placed form in scanning.  We can print for him if he wants us to mail to him or .     Still waiting on notes from [facility].

## 2019-04-09 NOTE — TELEPHONE ENCOUNTER
----- Message from KONG Koo sent at 4/8/2019  9:40 PM EDT -----  Regarding: diabetic eye exam   Can you contact [facility] on [address] and obtain his most recent diabetic exam (approx 2 weeks ago). Thanks

## 2019-04-09 NOTE — TELEPHONE ENCOUNTER
Left message to inform pt Biometric screening form is ready and asked if he wants to  or mail.  Or there is a fax number to send to if he would prefer that.

## 2019-05-08 DIAGNOSIS — E08.00 DIABETES MELLITUS DUE TO UNDERLYING CONDITION WITH HYPEROSMOLARITY WITHOUT COMA, WITHOUT LONG-TERM CURRENT USE OF INSULIN (HCC): ICD-10-CM

## 2019-05-08 RX ORDER — METFORMIN HYDROCHLORIDE 500 MG/1
500 TABLET, EXTENDED RELEASE ORAL
Qty: 90 TABLET | Refills: 0 | Status: SHIPPED | OUTPATIENT
Start: 2019-05-08 | End: 2019-08-08 | Stop reason: SDUPTHER

## 2019-05-17 ENCOUNTER — PREP FOR SURGERY (OUTPATIENT)
Dept: OTHER | Facility: HOSPITAL | Age: 64
End: 2019-05-17

## 2019-05-17 DIAGNOSIS — Z12.11 SCREENING FOR MALIGNANT NEOPLASM OF COLON: Primary | ICD-10-CM

## 2019-05-17 RX ORDER — SODIUM CHLORIDE, SODIUM LACTATE, POTASSIUM CHLORIDE, CALCIUM CHLORIDE 600; 310; 30; 20 MG/100ML; MG/100ML; MG/100ML; MG/100ML
30 INJECTION, SOLUTION INTRAVENOUS CONTINUOUS
Status: CANCELLED | OUTPATIENT
Start: 2019-06-10

## 2019-05-22 PROBLEM — Z12.11 SCREENING FOR MALIGNANT NEOPLASM OF COLON: Status: ACTIVE | Noted: 2019-05-22

## 2019-06-07 DIAGNOSIS — R10.84 GENERALIZED ABDOMINAL PAIN: ICD-10-CM

## 2019-06-07 RX ORDER — PANTOPRAZOLE SODIUM 40 MG/1
TABLET, DELAYED RELEASE ORAL
Qty: 180 TABLET | Refills: 1 | Status: SHIPPED | OUTPATIENT
Start: 2019-06-07 | End: 2019-12-13 | Stop reason: SDUPTHER

## 2019-06-10 ENCOUNTER — HOSPITAL ENCOUNTER (OUTPATIENT)
Facility: HOSPITAL | Age: 64
Setting detail: HOSPITAL OUTPATIENT SURGERY
Discharge: HOME | End: 2019-06-10
Attending: INTERNAL MEDICINE | Admitting: INTERNAL MEDICINE

## 2019-06-10 ENCOUNTER — ANESTHESIA (OUTPATIENT)
Dept: GASTROENTEROLOGY | Facility: HOSPITAL | Age: 64
End: 2019-06-10

## 2019-06-10 ENCOUNTER — ANESTHESIA EVENT (OUTPATIENT)
Dept: GASTROENTEROLOGY | Facility: HOSPITAL | Age: 64
End: 2019-06-10

## 2019-06-10 VITALS
RESPIRATION RATE: 16 BRPM | OXYGEN SATURATION: 99 % | BODY MASS INDEX: 28.28 KG/M2 | HEART RATE: 76 BPM | HEIGHT: 71 IN | WEIGHT: 202 LBS | SYSTOLIC BLOOD PRESSURE: 113 MMHG | DIASTOLIC BLOOD PRESSURE: 54 MMHG

## 2019-06-10 DIAGNOSIS — Z12.11 SCREENING FOR MALIGNANT NEOPLASM OF COLON: ICD-10-CM

## 2019-06-10 LAB — GLUCOSE BLDC GLUCOMTR-MCNC: 133 MG/DL (ref 70–130)

## 2019-06-10 PROCEDURE — G0121 COLON CA SCRN NOT HI RSK IND: HCPCS | Performed by: INTERNAL MEDICINE

## 2019-06-10 PROCEDURE — 25010000002 PROPOFOL 10 MG/ML EMULSION: Performed by: ANESTHESIOLOGY

## 2019-06-10 PROCEDURE — 82962 GLUCOSE BLOOD TEST: CPT

## 2019-06-10 RX ORDER — PROMETHAZINE HYDROCHLORIDE 25 MG/ML
12.5 INJECTION, SOLUTION INTRAMUSCULAR; INTRAVENOUS ONCE AS NEEDED
Status: DISCONTINUED | OUTPATIENT
Start: 2019-06-10 | End: 2019-06-10 | Stop reason: HOSPADM

## 2019-06-10 RX ORDER — PROMETHAZINE HYDROCHLORIDE 25 MG/1
25 SUPPOSITORY RECTAL ONCE AS NEEDED
Status: DISCONTINUED | OUTPATIENT
Start: 2019-06-10 | End: 2019-06-10 | Stop reason: HOSPADM

## 2019-06-10 RX ORDER — PROMETHAZINE HYDROCHLORIDE 25 MG/1
25 TABLET ORAL ONCE AS NEEDED
Status: DISCONTINUED | OUTPATIENT
Start: 2019-06-10 | End: 2019-06-10 | Stop reason: HOSPADM

## 2019-06-10 RX ORDER — PROPOFOL 10 MG/ML
VIAL (ML) INTRAVENOUS AS NEEDED
Status: DISCONTINUED | OUTPATIENT
Start: 2019-06-10 | End: 2019-06-10 | Stop reason: SURG

## 2019-06-10 RX ORDER — PROPOFOL 10 MG/ML
VIAL (ML) INTRAVENOUS CONTINUOUS PRN
Status: DISCONTINUED | OUTPATIENT
Start: 2019-06-10 | End: 2019-06-10 | Stop reason: SURG

## 2019-06-10 RX ORDER — SODIUM CHLORIDE, SODIUM LACTATE, POTASSIUM CHLORIDE, CALCIUM CHLORIDE 600; 310; 30; 20 MG/100ML; MG/100ML; MG/100ML; MG/100ML
30 INJECTION, SOLUTION INTRAVENOUS CONTINUOUS
Status: DISCONTINUED | OUTPATIENT
Start: 2019-06-10 | End: 2019-06-10 | Stop reason: HOSPADM

## 2019-06-10 RX ADMIN — SODIUM CHLORIDE, POTASSIUM CHLORIDE, SODIUM LACTATE AND CALCIUM CHLORIDE 30 ML/HR: 600; 310; 30; 20 INJECTION, SOLUTION INTRAVENOUS at 08:37

## 2019-06-10 RX ADMIN — PROPOFOL 200 MCG/KG/MIN: 10 INJECTION, EMULSION INTRAVENOUS at 08:55

## 2019-06-10 RX ADMIN — SODIUM CHLORIDE, POTASSIUM CHLORIDE, SODIUM LACTATE AND CALCIUM CHLORIDE: 600; 310; 30; 20 INJECTION, SOLUTION INTRAVENOUS at 08:53

## 2019-06-10 RX ADMIN — PROPOFOL 100 MG: 10 INJECTION, EMULSION INTRAVENOUS at 08:55

## 2019-06-10 NOTE — ANESTHESIA PREPROCEDURE EVALUATION
Anesthesia Evaluation     NPO Solid Status: > 8 hours             Airway   Mallampati: II  TM distance: >3 FB  Neck ROM: full  Dental - normal exam     Pulmonary - normal exam   Cardiovascular - normal exam    (-) past MI      Neuro/Psych  GI/Hepatic/Renal/Endo    (+)  GERD,  diabetes mellitus,     Musculoskeletal     Abdominal    Substance History      OB/GYN          Other                        Anesthesia Plan    ASA 2     MAC     Anesthetic plan, all risks, benefits, and alternatives have been provided, discussed and informed consent has been obtained with: patient.

## 2019-06-10 NOTE — H&P
"[facility]  Pre Procedure History & Physical    Chief Complaint:   Time for my colonoscopy    Subjective     HPI:   64 y.o. male presenting for average risk screening.  Normal prior cscope 2008.  No current GI issues.  No family hx of colon cancer or polyps.        Past Medical History:   Past Medical History:   Diagnosis Date   • Arthritis    • Diabetes mellitus (CMS/HCC)    • Finger fracture, left 09/22/2018   • GERD (gastroesophageal reflux disease)    • Painful swelling of joint    • Tear of meniscus of knee        Family History:  Family History   Problem Relation Age of Onset   • Rheumatologic disease Mother    • Heart disease Father    • Cancer Sister    • Diabetes Brother    • Cancer Brother    • No Known Problems Daughter    • No Known Problems Son    • No Known Problems Other    • No Known Problems Daughter    • Malig Hyperthermia Neg Hx        Social History:   reports that he has never smoked. He has never used smokeless tobacco. He reports that he uses drugs. Drug: \"Crack\" cocaine. He reports that he does not drink alcohol.    Medications:   Medications Prior to Admission   Medication Sig Dispense Refill Last Dose   • acetaminophen (TYLENOL) 650 MG 8 hr tablet Take 650 mg by mouth Every 8 (Eight) Hours As Needed for Mild Pain .   Past Week at Unknown time   • cetirizine (zyrTEC) 10 MG tablet Take 10 mg by mouth Daily.   Past Week at Unknown time   • guaiFENesin (MUCINEX) 600 MG 12 hr tablet Take 1,200 mg by mouth As Needed for Cough.   Past Week at Unknown time   • metFORMIN ER (GLUCOPHAGE-XR) 500 MG 24 hr tablet Take 1 tablet by mouth Daily With Breakfast. 90 tablet 0 6/8/2019 at Unknown time   • Multiple Vitamins-Minerals (CENTRUM SILVER ADULT 50+) tablet Take 1 tablet by mouth Daily. HOLD FOR SURGERY   Past Week at Unknown time   • azithromycin (ZITHROMAX Z-CATHY) 250 MG tablet Take 2 tablets the first day, then 1 tablet daily for 4 days. 6 tablet 0    • benzonatate (TESSALON) " "200 MG capsule Take 1 capsule by mouth 3 (Three) Times a Day As Needed for Cough. 30 capsule 0    • betamethasone dipropionate (DIPROLENE) 0.05 % cream Apply 1 application topically to the appropriate area as directed As Needed for Rash. 15 g 0 Taking   • docusate sodium 100 MG capsule Take 100 mg by mouth 2 (Two) Times a Day. 60 each 0 Taking   • glucose blood test strip Patient tests two times a day 100 each 0 Taking   • ONE TOUCH LANCETS misc 1 each 2 (Two) Times a Day. 200 each 0 Taking   • pantoprazole (PROTONIX) 40 MG EC tablet Take 40 mg by mouth 2 (Two) Times a Day.   6/8/2019   • pantoprazole (PROTONIX) 40 MG EC tablet TAKE 1 TABLET BY MOUTH TWICE DAILY 180 tablet 1    • valACYclovir (VALTREX) 500 MG tablet Take 500 mg by mouth Every Night.   6/8/2019       Allergies:  Patient has no known allergies.    ROS:    Pertinent items are noted in HPI     Objective     Blood pressure 127/79, pulse 73, resp. rate 12, height 180.3 cm (71\"), weight 91.6 kg (202 lb), SpO2 97 %.    Physical Exam   Constitutional: Pt is oriented to person, place, and time and well-developed, well-nourished, and in no distress.   HENT:   Mouth/Throat: Oropharynx is clear and moist.   Neck: Normal range of motion. Neck supple.   Cardiovascular: Normal rate, regular rhythm and normal heart sounds.    Pulmonary/Chest: Effort normal and breath sounds normal. No respiratory distress. No  wheezes.   Abdominal: Soft. Bowel sounds are normal.   Skin: Skin is warm and dry.   Psychiatric: Mood, memory, affect and judgment normal.     Assessment/Plan     Diagnosis:  Encounter for screening for colon cancer    Anticipated Surgical Procedure:  Colonoscopy    The risks, benefits, and alternatives of this procedure have been discussed with the patient or the responsible party- the patient understands and agrees to proceed.                                                                "

## 2019-06-10 NOTE — ANESTHESIA POSTPROCEDURE EVALUATION
Patient: Bob Biggs Sr.    Procedure Summary     Date:  06/10/19 Room / Location:  [facility] ENDOSCOPY 10 /  [facility]    Anesthesia Start:  0853 Anesthesia Stop:  0914    Procedure:  COLONOSCOPY INTO CECUM AND TI (N/A ) Diagnosis:       Screening for malignant neoplasm of colon      (Screening for malignant neoplasm of colon [Z12.11])    Surgeon:  Nato Khoury MD Provider:  Celso Mendez MD    Anesthesia Type:  MAC ASA Status:  2          Anesthesia Type: MAC  Last vitals  BP   101/72 (06/10/19 0920)   Temp       Pulse   80 (06/10/19 0920)   Resp   16 (06/10/19 0920)     SpO2   99 % (06/10/19 0920)     Post Anesthesia Care and Evaluation    Patient location during evaluation: bedside  Pain management: adequate  Airway patency: patent  Anesthetic complications: No anesthetic complications    Cardiovascular status: acceptable  Respiratory status: acceptable  Hydration status: acceptable

## 2019-07-10 ENCOUNTER — TRANSCRIBE ORDERS (OUTPATIENT)
Dept: PHYSICAL THERAPY | Facility: CLINIC | Age: 64
End: 2019-07-10

## 2019-07-10 DIAGNOSIS — S46.911A STRAIN OF RIGHT SHOULDER, INITIAL ENCOUNTER: Primary | ICD-10-CM

## 2019-07-11 ENCOUNTER — TREATMENT (OUTPATIENT)
Dept: PHYSICAL THERAPY | Facility: CLINIC | Age: 64
End: 2019-07-11

## 2019-07-11 DIAGNOSIS — M25.511 ACUTE PAIN OF RIGHT SHOULDER: Primary | ICD-10-CM

## 2019-07-11 DIAGNOSIS — M79.601 PAIN OF RIGHT UPPER EXTREMITY: ICD-10-CM

## 2019-07-11 PROCEDURE — 97014 ELECTRIC STIMULATION THERAPY: CPT | Performed by: PHYSICAL THERAPIST

## 2019-07-11 PROCEDURE — 97140 MANUAL THERAPY 1/> REGIONS: CPT | Performed by: PHYSICAL THERAPIST

## 2019-07-11 PROCEDURE — 97161 PT EVAL LOW COMPLEX 20 MIN: CPT | Performed by: PHYSICAL THERAPIST

## 2019-07-11 PROCEDURE — 97110 THERAPEUTIC EXERCISES: CPT | Performed by: PHYSICAL THERAPIST

## 2019-07-11 NOTE — PROGRESS NOTES
Physical Therapy Initial Evaluation and Plan of Care        Subjective Evaluation    History of Present Illness  Date of onset: 2019  Mechanism of injury: Repetitive motion from mopping floors - had hurt before but got worse this day      Patient Occupation: sanitation at [employer]   Precautions and Work Restrictions: 10-15 lbsPain  Current pain ratin  At best pain ratin  At worst pain ratin  Location: top of R shoulder/scapula and down R arm with some numbness whole arm; some neck pain  Quality: dull ache  Relieving factors: heat (lift arm up to head)  Aggravating factors: repetitive movement, outstretched reach, sleeping and lifting  Progression: worsening    Hand dominance: right    Diagnostic Tests  No diagnostic tests performed    Treatments  Current treatment: medication  Patient Goals  Patient goals for therapy: decreased pain and return to work             Objective       Postural Observations  Seated posture: fair    Additional Postural Observation Details  Slouched with drooped shdrs    Tenderness     Right Shoulder  Tenderness in the lateral scapula, medial scapula and scapular spine.     Cervical/Thoracic Screen   Cervical range of motion within normal limits with the following exceptions: WFL with increased prox shoulder/trap pain with RSB    Neurological Testing     Sensation     Shoulder   Left Shoulder   Intact: light touch    Right Shoulder   Intact: light touch    Active Range of Motion   Left Shoulder   Flexion: WFL  Abduction: WFL  External rotation BTH: WFL  Internal rotation BTB: sacrum     Right Shoulder   Flexion: WFL  Abduction: WFL  External rotation BTH: WFL  Internal rotation BTB: WFL    Strength/Myotome Testing   Cervical Spine     Right   Levator scapulae (C4): 5    Right Shoulder     Planes of Motion   Flexion: 5   Abduction: 5   External rotation at 0°: 4   Internal rotation at 0°: 5     Isolated Muscles   Levator scapulae: 5   Middle trapezius: 5   Serratus  anterior: 5     Tests     Right Shoulder   Positive ULTT1 and ULTT3.   Negative empty can and sulcus sign.     Additional Tests Details  Negative Spurling and cervical distraciton; diffuse pain superior Shoulder with HK; pain with both IR and ER Muscoda         Assessment & Plan     Assessment  Impairments: impaired physical strength and pain with function  Assessment details: 63 yo M gradual onset and worsening of RUE/upper quadrant pain and paresthesias from repetitive mopping presents with signs sxs possible DOMONIQUE. Diffuse shoulder signs/sxs without obvious internal derangement. Needs skilled PT to address these deficits  Prognosis: good  Functional Limitations: lifting, sleeping, uncomfortable because of pain, reaching behind back and unable to perform repetitive tasks  Goals  Plan Goals: STGs (2 wks)    1. Patient demonstrates kelly for and compliance with HEP and precautions/restrictions  2. Patient reports pain decreased to 5/10 for improved ADLs  3. Patient reports decreased sxs into RUE by 50-75%  4. Review body mechanics with ADLs/work tasks    LTGs (4 wks)    1. Patient demonstrates independence with HEP and body mechanics for prevention  2. AROM restored to pre-injury levels without  pain  3. Patient reports decreased sxs into RUE by %  4. Patient demonstrates ability to perform repetitive mopping simulation to allow return to ADLs/work without restriction      Plan  Therapy options: will be seen for skilled physical therapy services  Planned modality interventions: cryotherapy, thermotherapy (hydrocollator packs), electrical stimulation/Nigerien stimulation and ultrasound  Planned therapy interventions: body mechanics training, home exercise program, manual therapy, therapeutic activities, stretching, strengthening and neuromuscular re-education  Frequency: 3x week  Duration in weeks: 4  Treatment plan discussed with: patient        Manual Therapy:    9     mins  47904;  Therapeutic Exercise:    14      mins  75043;         Timed Treatment:   23   mins   Total Treatment:     60   mins    PT SIGNATURE: Mayuri Luly, PT   DATE TREATMENT INITIATED: 7/11/2019    Initial Certification  Certification Period: 10/9/2019  I certify that the therapy services are furnished while this patient is under my care.  The services outlined above are required by this patient, and will be reviewed every 90 days.     PHYSICIAN: Chandrika James, APRN      DATE:     Please sign and return via fax to [phone].. Thank you, [facility].

## 2019-07-15 ENCOUNTER — TREATMENT (OUTPATIENT)
Dept: PHYSICAL THERAPY | Facility: CLINIC | Age: 64
End: 2019-07-15

## 2019-07-15 DIAGNOSIS — M25.511 ACUTE PAIN OF RIGHT SHOULDER: Primary | ICD-10-CM

## 2019-07-15 DIAGNOSIS — M79.601 PAIN OF RIGHT UPPER EXTREMITY: ICD-10-CM

## 2019-07-15 PROCEDURE — 97014 ELECTRIC STIMULATION THERAPY: CPT | Performed by: PHYSICAL THERAPIST

## 2019-07-15 PROCEDURE — 97140 MANUAL THERAPY 1/> REGIONS: CPT | Performed by: PHYSICAL THERAPIST

## 2019-07-15 PROCEDURE — 97110 THERAPEUTIC EXERCISES: CPT | Performed by: PHYSICAL THERAPIST

## 2019-07-15 NOTE — PROGRESS NOTES
Physical Therapy Daily Progress Note      Visit # 2      Subjective   It's a lot better.  Finished steroid pack this am.  Still same sxs but not as much.    Objective   See Exercise, Manual, and Modality Logs for complete treatment.       Assessment/Plan    Responding favorably to Rx with dec intensity of sxs.  Still though with signs/sxs possible DOMONIQUE.    Re-assess              Manual Therapy:    8     mins  38145;  Therapeutic Exercise:    28     mins  86959;         Timed Treatment:   36   mins   Total Treatment:     51   mins    Mayuri Mauricio, PT  Physical Therapist

## 2019-07-16 ENCOUNTER — TREATMENT (OUTPATIENT)
Dept: PHYSICAL THERAPY | Facility: CLINIC | Age: 64
End: 2019-07-16

## 2019-07-16 DIAGNOSIS — M25.511 ACUTE PAIN OF RIGHT SHOULDER: Primary | ICD-10-CM

## 2019-07-16 DIAGNOSIS — M79.601 PAIN OF RIGHT UPPER EXTREMITY: ICD-10-CM

## 2019-07-16 PROCEDURE — 97530 THERAPEUTIC ACTIVITIES: CPT | Performed by: PHYSICAL THERAPIST

## 2019-07-16 PROCEDURE — 97110 THERAPEUTIC EXERCISES: CPT | Performed by: PHYSICAL THERAPIST

## 2019-07-16 PROCEDURE — 97140 MANUAL THERAPY 1/> REGIONS: CPT | Performed by: PHYSICAL THERAPIST

## 2019-07-16 NOTE — PROGRESS NOTES
Physical Therapy  Progress Note      7/16/2019  Chandrika James, APRN    Re: Bob Biggs Sr.  ________________________________________________________________    Mr. Bob Biggs Sr., has attended 3/3 PT sessions.  Treatment has consisted of: manual, there-ex, HEP, modalities, pt ed     S: Mr. Bob Biggs Sr. states: better - not as much pain or N/T.  Pain has moved down towards end of shoulder blade      Subjective     Objective       Postural Observations  Seated posture: fair    Additional Postural Observation Details  Slouched with drooped shdrs    Tenderness     Right Shoulder  Tenderness in the lateral scapula, medial scapula and scapular spine.     Cervical/Thoracic Screen   Cervical range of motion within normal limits with the following exceptions: WFL with increased prox shoulder/trap pain with RSB    Neurological Testing     Sensation     Shoulder   Left Shoulder   Intact: light touch    Right Shoulder   Intact: light touch    Active Range of Motion   Left Shoulder   Flexion: WFL  Abduction: WFL  External rotation BTH: WFL  Internal rotation BTB: sacrum     Right Shoulder   Flexion: WFL  Abduction: WFL  External rotation BTH: WFL  Internal rotation BTB: WFL    Strength/Myotome Testing   Cervical Spine     Right   Levator scapulae (C4): 5    Right Shoulder     Planes of Motion   Flexion: 5   Abduction: 5   External rotation at 0°: 5   Internal rotation at 0°: 5     Isolated Muscles   Levator scapulae: 5   Middle trapezius: 5   Serratus anterior: 5     Tests     Right Shoulder   Positive ULTT1 and ULTT3.   Negative Adson maneuver, empty can and sulcus sign.     Additional Tests Details  Negative Spurling and cervical distraciton; diffuse pain superior Shoulder with HK; post shdr/scap pain with  Lander    Functional Assessment     Comments  Geena lift 20 lbs to waist, 10 lbs to chest and 5 lbs OH but reports a strain at higher wts     See Exercise, Manual, and Modality Logs for complete  treatment.       Assessment/Plan    Overall improved with increased strength and decreased pain and paresthesias.  Still though with pain, tenderness and paresthesias with signs/sxs more of mild DOMONIQUE vs shoulder or cervical internal derangement.  Could benefit from continued PT to address remaining deficits.      Please advise             Manual Therapy:    8     mins  04934;  Therapeutic Exercise:    28     mins  86202;     Neuromuscular Cirilo:    0    mins  94054;    Therapeutic Activity:     10     mins  60792;     Gait Trainin     mins  94251;     Ultrasound:     0     mins  39316;    Work Hardening           0      mins 75610  Iontophoresis               0   mins 31567    Timed Treatment:   46   mins   Total Treatment:     61   mins    Mayuri Mauricio, PT  Physical Therapist

## 2019-07-26 ENCOUNTER — OFFICE VISIT (OUTPATIENT)
Dept: ORTHOPEDIC SURGERY | Facility: CLINIC | Age: 64
End: 2019-07-26

## 2019-07-26 VITALS — WEIGHT: 200 LBS | BODY MASS INDEX: 28 KG/M2 | TEMPERATURE: 98.2 F | HEIGHT: 71 IN

## 2019-07-26 DIAGNOSIS — Z96.652 HX OF TOTAL KNEE ARTHROPLASTY, LEFT: Primary | ICD-10-CM

## 2019-07-26 PROCEDURE — 73562 X-RAY EXAM OF KNEE 3: CPT | Performed by: ORTHOPAEDIC SURGERY

## 2019-07-26 PROCEDURE — 99212 OFFICE O/P EST SF 10 MIN: CPT | Performed by: ORTHOPAEDIC SURGERY

## 2019-07-26 NOTE — PROGRESS NOTES
Chief Complaint: Follow-up now 6-month status post left total knee    HPI: Mr. Biggs comes in for his left knee.  He says is doing great.  No pain.  He reports good use and function.  He has no limitations or problems.    Exam: Incision is healed.  He has full extension and flexion to 125 degrees.  No instability.  Gait is normal.    Imaging: AP, merchant and lateral views of the left knee are ordered and reviewed.  These are compared to previous x-rays.  No concerning findings noted.  Components appear well fixed and well-positioned.    Assessment: 6-month status post left total knee arthroplasty    Plan: He is doing great.  Follow-up annually.  Antibiotic prophylaxis recommendations discussed.    Nato Guerin MD  07/26/2019

## 2019-08-07 ENCOUNTER — TREATMENT (OUTPATIENT)
Dept: PHYSICAL THERAPY | Facility: CLINIC | Age: 64
End: 2019-08-07

## 2019-08-07 DIAGNOSIS — M25.511 ACUTE PAIN OF RIGHT SHOULDER: Primary | ICD-10-CM

## 2019-08-07 DIAGNOSIS — M79.601 PAIN OF RIGHT UPPER EXTREMITY: ICD-10-CM

## 2019-08-07 PROCEDURE — 97014 ELECTRIC STIMULATION THERAPY: CPT | Performed by: PHYSICAL THERAPIST

## 2019-08-07 PROCEDURE — 97140 MANUAL THERAPY 1/> REGIONS: CPT | Performed by: PHYSICAL THERAPIST

## 2019-08-07 PROCEDURE — 97110 THERAPEUTIC EXERCISES: CPT | Performed by: PHYSICAL THERAPIST

## 2019-08-07 NOTE — PROGRESS NOTES
Physical Therapy Daily Progress Note      Visit # 4      Subjective   same sxs but a little worse since no PT for awhile (waiting on insurance approval) but have been doing HEP    Objective   See Exercise, Manual, and Modality Logs for complete treatment.   + ULTT; poor posture    Assessment/Plan    Stalled improvement with lapse in PT but no obvious decline.  Responds well to Rx    continue with progression of postural ex as kelly               Manual Therapy:    9     mins  76251;  Therapeutic Exercise:    29     mins  61390;       Timed Treatment:   38   mins   Total Treatment:     53   mins    Mayuri Mauricio, PT  Physical Therapist

## 2019-08-08 DIAGNOSIS — E08.00 DIABETES MELLITUS DUE TO UNDERLYING CONDITION WITH HYPEROSMOLARITY WITHOUT COMA, WITHOUT LONG-TERM CURRENT USE OF INSULIN (HCC): ICD-10-CM

## 2019-08-08 RX ORDER — METFORMIN HYDROCHLORIDE 500 MG/1
500 TABLET, EXTENDED RELEASE ORAL
Qty: 90 TABLET | Refills: 0 | Status: SHIPPED | OUTPATIENT
Start: 2019-08-08 | End: 2019-11-30 | Stop reason: SDUPTHER

## 2019-08-08 RX ORDER — VALACYCLOVIR HYDROCHLORIDE 500 MG/1
TABLET, FILM COATED ORAL
Qty: 90 TABLET | Refills: 2 | Status: SHIPPED | OUTPATIENT
Start: 2019-08-08 | End: 2020-06-16

## 2019-08-09 ENCOUNTER — TREATMENT (OUTPATIENT)
Dept: PHYSICAL THERAPY | Facility: CLINIC | Age: 64
End: 2019-08-09

## 2019-08-09 DIAGNOSIS — M25.511 ACUTE PAIN OF RIGHT SHOULDER: Primary | ICD-10-CM

## 2019-08-09 DIAGNOSIS — M79.601 PAIN OF RIGHT UPPER EXTREMITY: ICD-10-CM

## 2019-08-09 PROCEDURE — 97140 MANUAL THERAPY 1/> REGIONS: CPT | Performed by: PHYSICAL THERAPIST

## 2019-08-09 PROCEDURE — 97110 THERAPEUTIC EXERCISES: CPT | Performed by: PHYSICAL THERAPIST

## 2019-08-09 PROCEDURE — 97014 ELECTRIC STIMULATION THERAPY: CPT | Performed by: PHYSICAL THERAPIST

## 2019-08-14 ENCOUNTER — TREATMENT (OUTPATIENT)
Dept: PHYSICAL THERAPY | Facility: CLINIC | Age: 64
End: 2019-08-14

## 2019-08-14 DIAGNOSIS — M25.511 ACUTE PAIN OF RIGHT SHOULDER: Primary | ICD-10-CM

## 2019-08-14 DIAGNOSIS — M79.601 PAIN OF RIGHT UPPER EXTREMITY: ICD-10-CM

## 2019-08-14 PROCEDURE — 97140 MANUAL THERAPY 1/> REGIONS: CPT | Performed by: PHYSICAL THERAPIST

## 2019-08-14 PROCEDURE — 97110 THERAPEUTIC EXERCISES: CPT | Performed by: PHYSICAL THERAPIST

## 2019-08-14 PROCEDURE — 97014 ELECTRIC STIMULATION THERAPY: CPT | Performed by: PHYSICAL THERAPIST

## 2019-08-14 NOTE — PROGRESS NOTES
Physical Therapy  Progress Note        8/14/2019  Chandrika James, APRN    Re: Bob Biggs Sr.  ________________________________________________________________    Mr. Bob Biggs Sr., has attended 6 PT sessions delayed insurance approval after initial 3 visits).  Treatment has consisted of: manual, there-ex, HEP, modalities     S: Mr. Bob Biggs Sr. states: I'm better but it still hurst if I use it a lot        Subjective     Objective       Postural Observations  Seated posture: fair    Additional Postural Observation Details  Slouched with drooped shdrs    Tenderness     Right Shoulder  Tenderness in the bicipital groove, lateral scapula and medial scapula. No tenderness in the scapular spine.     Cervical/Thoracic Screen   Cervical range of motion within normal limits with the following exceptions: WFL except RSB with increased scap/trap pain     Neurological Testing     Sensation     Shoulder   Left Shoulder   Intact: light touch    Right Shoulder   Intact: light touch    Active Range of Motion   Left Shoulder   Flexion: WFL  Abduction: WFL  External rotation BTH: WFL  Internal rotation BTB: sacrum     Right Shoulder   Flexion: WFL  Abduction: WFL and with pain  External rotation BTH: WFL  Internal rotation BTB: L2 with pain    Strength/Myotome Testing   Cervical Spine     Right   Levator scapulae (C4): 5    Right Shoulder     Planes of Motion   Flexion: 5   Abduction: 4+   External rotation at 0°: 5   Internal rotation at 0°: 5     Isolated Muscles   Biceps: 5   Levator scapulae: 5   Middle trapezius: 5   Serratus anterior: 5   Triceps: 5     Tests     Right Shoulder   Positive active compression (Stearns), empty can, ULTT1 and ULTT3.   Negative Adson maneuver and sulcus sign.     Additional Tests Details  Negative Spurling and cervical distraciton; diffuse pain superior Shoulder with HK    Functional Assessment     Comments  Max kelly  lift 30 lbs to waist, 20 lbs to chest and 10 lbs OH       See Exercise, Manual, and Modality Logs for complete treatment.       Assessment/Plan    Continues with diffuse shoulder/scapular sxs with possible but not obvious internal derangement.  Signs/sxs possible mild DOMONIQUE - some relief of neural tension with manual Rx. Mild improvement in lifting tolerance but still moderately limited.  Might benefit from another week of PT but may need further medical management if not notably improved after that.    Please advise              Manual Therapy:    8     mins  61767;  Therapeutic Exercise:    24     mins  71072;     Neuromuscular Cirilo:    0    mins  12320;    Therapeutic Activity:     3     mins  34139;     Gait Trainin     mins  97163;     Ultrasound:     0     mins  95640;    Work Hardening           0      mins 00968  Iontophoresis               0   mins 91090    Timed Treatment:   35   mins   Total Treatment:     50   mins    Mayuri Mauricio PT  Physical Therapist

## 2019-08-16 ENCOUNTER — TREATMENT (OUTPATIENT)
Dept: PHYSICAL THERAPY | Facility: CLINIC | Age: 64
End: 2019-08-16

## 2019-08-16 DIAGNOSIS — M79.601 PAIN OF RIGHT UPPER EXTREMITY: ICD-10-CM

## 2019-08-16 DIAGNOSIS — M25.511 ACUTE PAIN OF RIGHT SHOULDER: Primary | ICD-10-CM

## 2019-08-16 PROCEDURE — 97014 ELECTRIC STIMULATION THERAPY: CPT | Performed by: PHYSICAL THERAPIST

## 2019-08-16 PROCEDURE — 97140 MANUAL THERAPY 1/> REGIONS: CPT | Performed by: PHYSICAL THERAPIST

## 2019-08-16 PROCEDURE — 97110 THERAPEUTIC EXERCISES: CPT | Performed by: PHYSICAL THERAPIST

## 2019-08-16 NOTE — PROGRESS NOTES
Physical Therapy Daily Progress Note      Visit # 7      Subjective   A little better      Objective   See Exercise, Manual, and Modality Logs for complete treatment.       Assessment/Plan    Gradual improvement with dec c/o pain and dec NM tension noted with glides    Progress postural strengthening as kelly           Manual Therapy:    8     mins  98338;  Therapeutic Exercise:    28     mins  13068;         Timed Treatment:   36   mins   Total Treatment:     51   mins    Mayuri Mauricio, PT  Physical Therapist

## 2019-08-19 ENCOUNTER — TREATMENT (OUTPATIENT)
Dept: PHYSICAL THERAPY | Facility: CLINIC | Age: 64
End: 2019-08-19

## 2019-08-19 DIAGNOSIS — M25.511 ACUTE PAIN OF RIGHT SHOULDER: Primary | ICD-10-CM

## 2019-08-19 DIAGNOSIS — M79.601 PAIN OF RIGHT UPPER EXTREMITY: ICD-10-CM

## 2019-08-19 PROCEDURE — 97110 THERAPEUTIC EXERCISES: CPT | Performed by: PHYSICAL THERAPIST

## 2019-08-19 PROCEDURE — 97014 ELECTRIC STIMULATION THERAPY: CPT | Performed by: PHYSICAL THERAPIST

## 2019-08-19 PROCEDURE — 97140 MANUAL THERAPY 1/> REGIONS: CPT | Performed by: PHYSICAL THERAPIST

## 2019-08-19 NOTE — PROGRESS NOTES
Physical Therapy Daily Progress Note      Visit # 8      Subjective   A little pain in upper arm over the weekend.  Pain still around shoulder blade.    Objective   See Exercise, Manual, and Modality Logs for complete treatment.       Assessment/Plan    Overall gradual improvement with dec NM tension and tolerance for increased ex.  Still easily fatigued and c/o soreness with prolonged use.    reassess             Manual Therapy:    8     mins  46428;  Therapeutic Exercise:    33     mins  97598;         Timed Treatment:   41   mins   Total Treatment:     56   mins    Mayuri Mauricio PT  Physical Therapist

## 2019-08-21 ENCOUNTER — TREATMENT (OUTPATIENT)
Dept: PHYSICAL THERAPY | Facility: CLINIC | Age: 64
End: 2019-08-21

## 2019-08-21 DIAGNOSIS — M79.601 PAIN OF RIGHT UPPER EXTREMITY: ICD-10-CM

## 2019-08-21 DIAGNOSIS — M25.511 ACUTE PAIN OF RIGHT SHOULDER: Primary | ICD-10-CM

## 2019-08-21 PROCEDURE — 97110 THERAPEUTIC EXERCISES: CPT | Performed by: PHYSICAL THERAPIST

## 2019-08-21 PROCEDURE — 97014 ELECTRIC STIMULATION THERAPY: CPT | Performed by: PHYSICAL THERAPIST

## 2019-08-21 PROCEDURE — 97140 MANUAL THERAPY 1/> REGIONS: CPT | Performed by: PHYSICAL THERAPIST

## 2019-08-21 NOTE — PROGRESS NOTES
Physical Therapy  Progress Note        8/21/2019  Chandrika James, APRN    Re: Bob Biggs Sr.  ________________________________________________________________    Mr. Bob Biggs Sr., has attended 9 PT sessions.  Treatment has consisted of: manual, there-ex, HEP, modalities and pt education     S: Mr. Bob Biggs Sr. states: overall better with less pain and soreness but R arm is still not right.  Still having N/T and soreness down RUE to the hand (ulnar).      Subjective     Objective       Postural Observations  Seated posture: fair    Additional Postural Observation Details  Slouched with drooped shdrs    Tenderness     Right Shoulder  Tenderness in the bicipital groove, lateral scapula and medial scapula. No tenderness in the scapular spine.     Cervical/Thoracic Screen   Cervical range of motion within normal limits with the following exceptions: WFL except RSB and RROT with increased scap/trap pain     Neurological Testing     Sensation     Shoulder   Left Shoulder   Intact: light touch    Right Shoulder   Intact: light touch    Active Range of Motion   Left Shoulder   Flexion: WFL  Abduction: WFL  External rotation BTH: WFL  Internal rotation BTB: sacrum     Right Shoulder   Flexion: WFL  Abduction: WFL and with pain  External rotation BTH: WFL  Internal rotation BTB: L1 with pain    Strength/Myotome Testing   Cervical Spine     Right   Levator scapulae (C4): 5    Right Shoulder     Planes of Motion   Flexion: 5   Abduction: 4   External rotation at 0°: 5   Internal rotation at 0°: 5     Isolated Muscles   Biceps: 5   Levator scapulae: 5   Lower trapezius: 4-   Middle trapezius: 4+   Rhomboids: 4-   Serratus anterior: 5   Triceps: 4+ (pain)     Tests     Right Shoulder   Positive active compression (Bosque), empty can, ULTT1 and ULTT3.   Negative Adson maneuver and sulcus sign.     Additional Tests Details  Negative Spurling but some relief with cervical distraciton; diffuse pain superior  Shoulder with HK    Functional Assessment     Comments  Max kelly  lift 35 lbs to waist, 20 lbs to chest and 10 lbs OH      See Exercise, Manual, and Modality Logs for complete treatment.       Assessment/Plan    Overall with decreased pain but RUE pain and paresthesias persist and now noting more weakness.  No increased sxs with cervical depression/Spurlings but with relief with distraction indicating cervical spine involvement.    Please advise         Manual Therapy:    10     mins  97801;  Therapeutic Exercise:    35     mins  15152;     Neuromuscular Cirilo:    0    mins  36921;    Therapeutic Activity:     5     mins  81379;     Gait Trainin     mins  19385;     Ultrasound:     0     mins  36079;    Work Hardening           0      mins 93373  Iontophoresis               0   mins 33446    Timed Treatment:   50   mins   Total Treatment:     65   mins    Mayuri Mauricio PT  Physical Therapist

## 2019-09-24 ENCOUNTER — TELEPHONE (OUTPATIENT)
Dept: ORTHOPEDIC SURGERY | Facility: CLINIC | Age: 64
End: 2019-09-24

## 2019-09-24 RX ORDER — CEPHALEXIN 500 MG/1
CAPSULE ORAL
Qty: 4 CAPSULE | Refills: 2 | Status: SHIPPED | OUTPATIENT
Start: 2019-09-24 | End: 2020-01-21

## 2019-09-24 NOTE — TELEPHONE ENCOUNTER
Notified patient AMB E-prescribed a new prescription to [facility] at [phone] for Keflex 500 mg, #4, directions are to take all 4 capsules 1 hour prior to his procedure.  [place]2 [place] [facility].     Reminded patient to call 2-3 business days prior to dental appointments for antibiotic premeds, patient verbalized understanding & stated he just forgot. Patient appreciated the promptness of the call. Thanks /srh

## 2019-09-24 NOTE — TELEPHONE ENCOUNTER
Have E prescribed a new prescription to [facility] at [phone] for Keflex 500 mg, #4, directions are to take all 4 capsules 1 hour prior to his procedure.  Refill x2 per [facility]

## 2019-09-24 NOTE — TELEPHONE ENCOUNTER
Patient had LEFT TKA 11/08/18 by [facility]. Patient called stating he has dental appointment at 11:00 AM *today 9/24/19 - requesting antibiotic premeds. Uses [facility] #[id number] phone: [phone]. Patient was reminded to call 2-3 business days before dental appointments to get antibiotics and patient stated he would be calling his dentist to inform them as well. Patient can be reached at [phone]

## 2019-11-30 DIAGNOSIS — E08.00 DIABETES MELLITUS DUE TO UNDERLYING CONDITION WITH HYPEROSMOLARITY WITHOUT COMA, WITHOUT LONG-TERM CURRENT USE OF INSULIN (HCC): ICD-10-CM

## 2019-12-03 RX ORDER — METFORMIN HYDROCHLORIDE 500 MG/1
500 TABLET, EXTENDED RELEASE ORAL
Qty: 90 TABLET | Refills: 0 | Status: SHIPPED | OUTPATIENT
Start: 2019-12-03 | End: 2020-03-12

## 2019-12-13 DIAGNOSIS — R10.84 GENERALIZED ABDOMINAL PAIN: ICD-10-CM

## 2019-12-13 RX ORDER — PANTOPRAZOLE SODIUM 40 MG/1
TABLET, DELAYED RELEASE ORAL
Qty: 60 TABLET | Refills: 0 | Status: SHIPPED | OUTPATIENT
Start: 2019-12-13 | End: 2020-01-16 | Stop reason: SDUPTHER

## 2019-12-13 RX ORDER — PANTOPRAZOLE SODIUM 40 MG/1
TABLET, DELAYED RELEASE ORAL
Qty: 180 TABLET | Refills: 0 | OUTPATIENT
Start: 2019-12-13

## 2020-01-16 DIAGNOSIS — R10.84 GENERALIZED ABDOMINAL PAIN: ICD-10-CM

## 2020-01-16 RX ORDER — PANTOPRAZOLE SODIUM 40 MG/1
40 TABLET, DELAYED RELEASE ORAL 2 TIMES DAILY
Qty: 60 TABLET | Refills: 0 | Status: SHIPPED | OUTPATIENT
Start: 2020-01-16 | End: 2020-02-17

## 2020-01-16 NOTE — TELEPHONE ENCOUNTER
Pt called in for refll on pantoprazole, 40 mg and is requesting 90 days; Pt also would like to know why on the last refill it was only for 30 days. Please advise    Bob: [phone]    [facility] Curtis

## 2020-01-17 NOTE — TELEPHONE ENCOUNTER
Patient was informed of the medication refill. He is scheduled for an appointment for 1/21/2020 w/ fasting labs.

## 2020-01-21 ENCOUNTER — OFFICE VISIT (OUTPATIENT)
Dept: INTERNAL MEDICINE | Facility: CLINIC | Age: 65
End: 2020-01-21

## 2020-01-21 VITALS
SYSTOLIC BLOOD PRESSURE: 142 MMHG | BODY MASS INDEX: 28.56 KG/M2 | WEIGHT: 204 LBS | HEIGHT: 71 IN | OXYGEN SATURATION: 98 % | DIASTOLIC BLOOD PRESSURE: 90 MMHG | HEART RATE: 97 BPM

## 2020-01-21 DIAGNOSIS — Z23 NEED FOR 23-POLYVALENT PNEUMOCOCCAL POLYSACCHARIDE VACCINE: ICD-10-CM

## 2020-01-21 DIAGNOSIS — I10 ESSENTIAL HYPERTENSION: ICD-10-CM

## 2020-01-21 DIAGNOSIS — M54.2 CERVICALGIA: ICD-10-CM

## 2020-01-21 DIAGNOSIS — Z23 NEED FOR DIPHTHERIA-TETANUS-PERTUSSIS (TDAP) VACCINE: ICD-10-CM

## 2020-01-21 DIAGNOSIS — E08.00 DIABETES MELLITUS DUE TO UNDERLYING CONDITION WITH HYPEROSMOLARITY WITHOUT COMA, WITHOUT LONG-TERM CURRENT USE OF INSULIN (HCC): Primary | ICD-10-CM

## 2020-01-21 LAB
ALBUMIN SERPL-MCNC: 4.1 G/DL (ref 3.5–5.2)
ALBUMIN/GLOB SERPL: 1.2 G/DL
ALP SERPL-CCNC: 51 U/L (ref 39–117)
ALT SERPL W P-5'-P-CCNC: 20 U/L (ref 1–41)
ANION GAP SERPL CALCULATED.3IONS-SCNC: 11.5 MMOL/L (ref 5–15)
AST SERPL-CCNC: 24 U/L (ref 1–40)
BILIRUB SERPL-MCNC: 0.6 MG/DL (ref 0.2–1.2)
BUN BLD-MCNC: 16 MG/DL (ref 8–23)
BUN/CREAT SERPL: 20.3 (ref 7–25)
CALCIUM SPEC-SCNC: 8.8 MG/DL (ref 8.6–10.5)
CHLORIDE SERPL-SCNC: 106 MMOL/L (ref 98–107)
CHOLEST SERPL-MCNC: 180 MG/DL (ref 0–200)
CO2 SERPL-SCNC: 22.5 MMOL/L (ref 22–29)
CREAT BLD-MCNC: 0.79 MG/DL (ref 0.76–1.27)
DEPRECATED RDW RBC AUTO: 42.6 FL (ref 37–54)
ERYTHROCYTE [DISTWIDTH] IN BLOOD BY AUTOMATED COUNT: 13.2 % (ref 12.3–15.4)
GFR SERPL CREATININE-BSD FRML MDRD: 119 ML/MIN/1.73
GLOBULIN UR ELPH-MCNC: 3.3 GM/DL
GLUCOSE BLD-MCNC: 131 MG/DL (ref 65–99)
HBA1C MFR BLD: 8.01 % (ref 4.8–5.6)
HCT VFR BLD AUTO: 42.6 % (ref 37.5–51)
HDLC SERPL-MCNC: 38 MG/DL (ref 40–60)
HGB BLD-MCNC: 14.4 G/DL (ref 13–17.7)
LDLC SERPL CALC-MCNC: 130 MG/DL (ref 0–100)
LDLC/HDLC SERPL: 3.43 {RATIO}
MCH RBC QN AUTO: 30.6 PG (ref 26.6–33)
MCHC RBC AUTO-ENTMCNC: 33.8 G/DL (ref 31.5–35.7)
MCV RBC AUTO: 90.4 FL (ref 79–97)
PLATELET # BLD AUTO: 213 10*3/MM3 (ref 140–450)
PMV BLD AUTO: 11.4 FL (ref 6–12)
POTASSIUM BLD-SCNC: 4.4 MMOL/L (ref 3.5–5.2)
PROT SERPL-MCNC: 7.4 G/DL (ref 6–8.5)
RBC # BLD AUTO: 4.71 10*6/MM3 (ref 4.14–5.8)
SODIUM BLD-SCNC: 140 MMOL/L (ref 136–145)
TRIGL SERPL-MCNC: 58 MG/DL (ref 0–150)
TSH SERPL DL<=0.05 MIU/L-ACNC: 1.5 UIU/ML (ref 0.27–4.2)
VLDLC SERPL-MCNC: 11.6 MG/DL (ref 5–40)
WBC NRBC COR # BLD: 5.59 10*3/MM3 (ref 3.4–10.8)

## 2020-01-21 PROCEDURE — 90715 TDAP VACCINE 7 YRS/> IM: CPT | Performed by: NURSE PRACTITIONER

## 2020-01-21 PROCEDURE — 80053 COMPREHEN METABOLIC PANEL: CPT | Performed by: NURSE PRACTITIONER

## 2020-01-21 PROCEDURE — 90472 IMMUNIZATION ADMIN EACH ADD: CPT | Performed by: NURSE PRACTITIONER

## 2020-01-21 PROCEDURE — 83036 HEMOGLOBIN GLYCOSYLATED A1C: CPT | Performed by: NURSE PRACTITIONER

## 2020-01-21 PROCEDURE — 36415 COLL VENOUS BLD VENIPUNCTURE: CPT | Performed by: NURSE PRACTITIONER

## 2020-01-21 PROCEDURE — 99214 OFFICE O/P EST MOD 30 MIN: CPT | Performed by: NURSE PRACTITIONER

## 2020-01-21 PROCEDURE — 90471 IMMUNIZATION ADMIN: CPT | Performed by: NURSE PRACTITIONER

## 2020-01-21 PROCEDURE — 85027 COMPLETE CBC AUTOMATED: CPT | Performed by: NURSE PRACTITIONER

## 2020-01-21 PROCEDURE — 84443 ASSAY THYROID STIM HORMONE: CPT | Performed by: NURSE PRACTITIONER

## 2020-01-21 PROCEDURE — 80061 LIPID PANEL: CPT | Performed by: NURSE PRACTITIONER

## 2020-01-21 PROCEDURE — 90732 PPSV23 VACC 2 YRS+ SUBQ/IM: CPT | Performed by: NURSE PRACTITIONER

## 2020-01-21 RX ORDER — CYCLOBENZAPRINE HCL 10 MG
10 TABLET ORAL 3 TIMES DAILY PRN
Qty: 30 TABLET | Refills: 1 | Status: SHIPPED | OUTPATIENT
Start: 2020-01-21

## 2020-01-21 NOTE — PROGRESS NOTES
Angella Biggs Sr. is a 65 y.o. male.     He is not monitoring his blood sugar or blood pressure at home. He is up to date with dental and vision exam.     He reports having increased neck pain in the last several weeks. The pain is radiates down right arm. He reports having imaging of cervical spine, however I am not able to see the report. He will obtain results and provide me a copy.     Diabetes   He presents for his follow-up diabetic visit. He has type 2 diabetes mellitus. His disease course has been stable. There are no hypoglycemic associated symptoms. Pertinent negatives for hypoglycemia include no dizziness or headaches. Pertinent negatives for diabetes include no blurred vision, no chest pain, no fatigue, no foot paresthesias, no polyphagia, no polyuria, no visual change and no weakness. Symptoms are stable. Current diabetic treatment includes oral agent (monotherapy). He is compliant with treatment all of the time. His weight is stable. He is following a generally unhealthy diet. He participates in exercise every other day. An ACE inhibitor/angiotensin II receptor blocker is contraindicated.   Neck Pain    This is a chronic problem. The current episode started more than 1 month ago. The pain is present in the right side. Associated symptoms include numbness (right hand/arm ). Pertinent negatives include no chest pain, headaches, visual change or weakness.        The following portions of the patient's history were reviewed and updated as appropriate: allergies, current medications, past family history, past medical history, past social history, past surgical history and problem list.    Review of Systems   Constitutional: Negative for chills and fatigue.   Eyes: Negative for blurred vision and visual disturbance.   Respiratory: Negative for cough, shortness of breath and wheezing.    Cardiovascular: Negative for chest pain, palpitations and leg swelling.   Endocrine: Negative for  polyphagia and polyuria.   Musculoskeletal: Positive for neck pain and neck stiffness.   Neurological: Positive for numbness (right hand/arm ). Negative for dizziness, weakness, light-headedness and headaches.       Objective   Physical Exam   Constitutional: He is oriented to person, place, and time. He appears well-developed and well-nourished.   HENT:   Head: Normocephalic.   Nose: Nose normal.   Neck: Muscular tenderness present. No spinous process tenderness present. Carotid bruit is not present. Normal range of motion present. No thyroid mass and no thyromegaly present.       Cardiovascular: Regular rhythm and normal heart sounds. Exam reveals no S3 and no S4.   No murmur heard.  Pulmonary/Chest: Effort normal and breath sounds normal. He has no decreased breath sounds. He has no wheezes. He has no rhonchi. He has no rales.   Musculoskeletal: He exhibits no edema.        Right shoulder: He exhibits normal range of motion, no tenderness, no pain and no spasm.        Left shoulder: He exhibits normal range of motion, no tenderness and no pain.        Cervical back: He exhibits decreased range of motion, tenderness and pain.   Neurological: He is alert and oriented to person, place, and time. Gait normal.   Skin: Skin is warm and dry.   Psychiatric: He has a normal mood and affect.       Assessment/Plan   Bob was seen today for diabetes and neck pain.    Diagnoses and all orders for this visit:    Diabetes mellitus due to underlying condition with hyperosmolarity without coma, without long-term current use of insulin (CMS/[facility])  -     Hemoglobin A1c; Future  -     Lipid Panel; Future  -     Comprehensive Metabolic Panel; Future  -     CBC No Differential; Future  -     TSH Rfx On Abnormal To Free T4; Future  -     Hemoglobin A1c  -     Lipid Panel  -     Comprehensive Metabolic Panel  -     CBC No Differential  -     TSH Rfx On Abnormal To Free T4    Essential hypertension  Comments:  elevated in office, I  suspect secondary to pain, he will monitor and call if greater than 140/90    Cervicalgia  Comments:  needs heat, massage, stretching (demonstrated for patient); he declines PT at this time  Orders:  -     cyclobenzaprine (FLEXERIL) 10 MG tablet; Take 1 tablet by mouth 3 (Three) Times a Day As Needed for Muscle Spasms.    Need for diphtheria-tetanus-pertussis (Tdap) vaccine  -     Tdap Vaccine Greater Than or Equal To 6yo IM    Need for 23-polyvalent pneumococcal polysaccharide vaccine  -     Pneumococcal Polysaccharide Vaccine 23-Valent Greater Than or Equal To 1yo Subcutaneous / IM      I will completed biometric screening form for patient.   He was advised to driving precautions with muscle relaxer and potential drowsy effects. Will check A1c and if ok will send in oral steroid letty.   He will obtain and provide me copy of prior imaging of neck.

## 2020-01-22 ENCOUNTER — TELEPHONE (OUTPATIENT)
Dept: INTERNAL MEDICINE | Facility: CLINIC | Age: 65
End: 2020-01-22

## 2020-01-22 DIAGNOSIS — M54.2 CERVICALGIA: Primary | ICD-10-CM

## 2020-01-22 RX ORDER — METHYLPREDNISOLONE 4 MG/1
TABLET ORAL
Qty: 21 TABLET | Refills: 0 | Status: SHIPPED | OUTPATIENT
Start: 2020-01-22 | End: 2020-07-06

## 2020-01-22 NOTE — TELEPHONE ENCOUNTER
Patient was seen in office. He was given work excuse with return to work on Monday, January 27. He has physical work. He was informed of recommendations for pain management and/or neurosurgical consult along with PT. He would like to do pain management and will call with information of provider he would like to see. I also sent over oral steroids.     I reviewed lab work. Elevated A1c-he declines increasing metformin. He reports he work harder on low carb/sugar diet and monitor blood sugar more frequently. Also discussed cholesterol levels. He declines statin therapy. He wishes to work on low fat/cholesterol diet. I informed him of increased for cardiac event based on labs. He was given written copy of lab work.

## 2020-01-22 NOTE — TELEPHONE ENCOUNTER
I returned call to patient regarding his telephone message. Also wanted to discuss abnormal labs and MRI report. There was no answer so I left a message for patient to return call.

## 2020-01-22 NOTE — TELEPHONE ENCOUNTER
Patient was seen yesterday with Roman and received a Tetanus Shot and Pneumonia Shot. Patient states he is in a lot of pain and wouldn't be able to go to work. He's requesting a work excuse for today, tomorrow and Friday.      Please call patient to advise.   Call back [phone]

## 2020-01-22 NOTE — TELEPHONE ENCOUNTER
Pt returning phone call, he is also on his way in to  the forms needed.  -work not  -metric screening

## 2020-02-05 ENCOUNTER — TELEPHONE (OUTPATIENT)
Dept: INTERNAL MEDICINE | Facility: CLINIC | Age: 65
End: 2020-02-05

## 2020-02-05 NOTE — TELEPHONE ENCOUNTER
Patient wanted to let Leona know that he wants to try Physical therapy, he would like to try to go to the [facility] on [address]. He is a requesting a referral to be sent over so he may schedule.      Pt callback  [phone]

## 2020-02-05 NOTE — TELEPHONE ENCOUNTER
Left message informing patient that KONG Koo is out of the office today and this will be address 02/06/2020

## 2020-02-06 DIAGNOSIS — M54.2 CERVICALGIA: Primary | ICD-10-CM

## 2020-02-13 ENCOUNTER — APPOINTMENT (OUTPATIENT)
Dept: PHYSICAL THERAPY | Facility: HOSPITAL | Age: 65
End: 2020-02-13

## 2020-02-17 DIAGNOSIS — R10.84 GENERALIZED ABDOMINAL PAIN: ICD-10-CM

## 2020-02-17 RX ORDER — PANTOPRAZOLE SODIUM 40 MG/1
TABLET, DELAYED RELEASE ORAL
Qty: 60 TABLET | Refills: 0 | Status: SHIPPED | OUTPATIENT
Start: 2020-02-17 | End: 2020-02-19 | Stop reason: SDUPTHER

## 2020-02-19 ENCOUNTER — TELEPHONE (OUTPATIENT)
Dept: INTERNAL MEDICINE | Facility: CLINIC | Age: 65
End: 2020-02-19

## 2020-02-19 DIAGNOSIS — R10.84 GENERALIZED ABDOMINAL PAIN: ICD-10-CM

## 2020-02-19 RX ORDER — PANTOPRAZOLE SODIUM 40 MG/1
40 TABLET, DELAYED RELEASE ORAL 2 TIMES DAILY
Qty: 180 TABLET | Refills: 1 | Status: SHIPPED | OUTPATIENT
Start: 2020-02-19 | End: 2020-09-14

## 2020-02-19 NOTE — TELEPHONE ENCOUNTER
Pt called and is requesting a 90 day supply for pantoprazole (PROTONIX) 40 MG EC tablet for a quantity of 180.    Pt call back   [phone]  [facility] confirmed

## 2020-03-10 ENCOUNTER — TELEPHONE (OUTPATIENT)
Dept: INTERNAL MEDICINE | Facility: CLINIC | Age: 65
End: 2020-03-10

## 2020-03-10 NOTE — TELEPHONE ENCOUNTER
PT IS CALLING IN REGARDS TO WANTING OTHER OPTIONS FOR HIS METFORMIN.     PLEASE CALL AND ADVISE [phone]

## 2020-03-10 NOTE — TELEPHONE ENCOUNTER
After speaking with the patient he states he was worried that after being on the medication so long it is not as effective and the side effects in which are given with the medication (only having fatigue).  Advised the patient KONG Koo states she could switch the medication but they all have side effects and it possibly might not work as well.  The patient at that point states he will continue with the metformin and he was advised to keep his follow up  appointment to discuss further.

## 2020-03-12 DIAGNOSIS — E08.00 DIABETES MELLITUS DUE TO UNDERLYING CONDITION WITH HYPEROSMOLARITY WITHOUT COMA, WITHOUT LONG-TERM CURRENT USE OF INSULIN (HCC): ICD-10-CM

## 2020-03-12 RX ORDER — METFORMIN HYDROCHLORIDE 500 MG/1
TABLET, EXTENDED RELEASE ORAL
Qty: 90 TABLET | Refills: 1 | Status: SHIPPED | OUTPATIENT
Start: 2020-03-12 | End: 2020-09-08

## 2020-05-13 ENCOUNTER — DOCUMENTATION (OUTPATIENT)
Dept: PHYSICAL THERAPY | Facility: CLINIC | Age: 65
End: 2020-05-13

## 2020-06-16 RX ORDER — VALACYCLOVIR HYDROCHLORIDE 500 MG/1
TABLET, FILM COATED ORAL
Qty: 90 TABLET | Refills: 2 | Status: SHIPPED | OUTPATIENT
Start: 2020-06-16 | End: 2021-03-01

## 2020-07-06 ENCOUNTER — OFFICE VISIT (OUTPATIENT)
Dept: INTERNAL MEDICINE | Facility: CLINIC | Age: 65
End: 2020-07-06

## 2020-07-06 VITALS
RESPIRATION RATE: 14 BRPM | WEIGHT: 202 LBS | BODY MASS INDEX: 28.28 KG/M2 | HEIGHT: 71 IN | SYSTOLIC BLOOD PRESSURE: 154 MMHG | DIASTOLIC BLOOD PRESSURE: 80 MMHG

## 2020-07-06 DIAGNOSIS — R21 RASH: ICD-10-CM

## 2020-07-06 DIAGNOSIS — I10 ESSENTIAL HYPERTENSION: ICD-10-CM

## 2020-07-06 DIAGNOSIS — E08.00 DIABETES MELLITUS DUE TO UNDERLYING CONDITION WITH HYPEROSMOLARITY WITHOUT COMA, WITHOUT LONG-TERM CURRENT USE OF INSULIN (HCC): ICD-10-CM

## 2020-07-06 DIAGNOSIS — E08.00 DIABETES MELLITUS DUE TO UNDERLYING CONDITION WITH HYPEROSMOLARITY WITHOUT COMA, WITHOUT LONG-TERM CURRENT USE OF INSULIN (HCC): Primary | ICD-10-CM

## 2020-07-06 DIAGNOSIS — Z12.5 PROSTATE CANCER SCREENING: ICD-10-CM

## 2020-07-06 PROCEDURE — 99214 OFFICE O/P EST MOD 30 MIN: CPT | Performed by: NURSE PRACTITIONER

## 2020-07-06 RX ORDER — BETAMETHASONE DIPROPIONATE 0.5 MG/G
1 CREAM TOPICAL AS NEEDED
Qty: 15 G | Refills: 1 | Status: SHIPPED | OUTPATIENT
Start: 2020-07-06 | End: 2020-11-19 | Stop reason: SDUPTHER

## 2020-07-06 NOTE — PROGRESS NOTES
Angella Biggs Sr. is a 65 y.o. male.     He has not been checking blood sugar routinely. He is doing 20, 000 steps a day. His not been checking blood pressure daily. He is up to date with dental and vision exam.     Diabetes   He presents for his follow-up diabetic visit. He has type 2 diabetes mellitus. His disease course has been stable. Pertinent negatives for hypoglycemia include no dizziness or headaches. Pertinent negatives for diabetes include no blurred vision, no chest pain, no fatigue, no foot paresthesias, no polydipsia, no polyphagia, no polyuria and no visual change. Symptoms are stable. Current diabetic treatment includes oral agent (monotherapy). He is compliant with treatment all of the time. His weight is stable.   Hypertension   This is a chronic problem. The current episode started more than 1 year ago. The problem is unchanged. The problem is controlled. Pertinent negatives include no anxiety, blurred vision, chest pain, headaches, orthopnea, palpitations, peripheral edema, PND or shortness of breath.        The following portions of the patient's history were reviewed and updated as appropriate: allergies, current medications, past family history, past medical history, past social history, past surgical history and problem list.    Review of Systems   Constitutional: Negative for activity change, appetite change, fatigue and fever.        Overall doing well    Eyes: Negative for blurred vision.   Respiratory: Negative for cough, shortness of breath and wheezing.    Cardiovascular: Negative for chest pain, palpitations, orthopnea, leg swelling and PND.   Endocrine: Negative for polydipsia, polyphagia and polyuria.   Neurological: Negative for dizziness and headaches.       Objective   Physical Exam   Constitutional: He is oriented to person, place, and time. He appears well-developed and well-nourished.   HENT:   Head: Normocephalic.   Nose: Nose normal.   Neck: Carotid bruit is  not present. No thyroid mass and no thyromegaly present.   Cardiovascular: Regular rhythm and normal heart sounds. Exam reveals no S3 and no S4.   No murmur heard.  Repeat bp left arm 142/80  No pedal edema    Pulmonary/Chest: Effort normal and breath sounds normal. He has no decreased breath sounds. He has no wheezes. He has no rhonchi. He has no rales.   Musculoskeletal: He exhibits no edema.    Bob had a diabetic foot exam performed today.   During the foot exam he had a monofilament test performed.  Skin Integrity  -  His right foot skin is intact.  He has no right foot ulcer, non-callous right foot and right heel is not dry and cracked.His left foot skin is intact. He has no left foot ulcer, non-callous left foot and no left heel dry and cracked..  Neurological: He is alert and oriented to person, place, and time. Gait normal.   Skin: Skin is warm and dry.   Psychiatric: He has a normal mood and affect. His speech is normal and behavior is normal. Judgment and thought content normal. Cognition and memory are normal.       Assessment/Plan   Bob was seen today for diabetes.    Diagnoses and all orders for this visit:    Diabetes mellitus due to underlying condition with hyperosmolarity without coma, without long-term current use of insulin (CMS/[facility])  Comments:  will check A1c today, needs low carb/sugar diet   Orders:  -     glucose blood test strip; Use as instructed  -     Hemoglobin A1c  -     Comprehensive Metabolic Panel  -     Microalbumin / Creatinine Urine Ratio - Urine, Clean Catch  -     Lipid Panel  -     CBC & Differential  -     TSH Rfx On Abnormal To Free T4    Essential hypertension  Comments:  needs low salt diet and exercise; will need to consider medication     Rash  Comments:  intermittent   Orders:  -     betamethasone dipropionate (DIPROLENE) 0.05 % cream; Apply 1 application topically to the appropriate area as directed As Needed for Rash.    Prostate cancer screening  -     PSA  Screen    He was advised to hold on shingrix due to current pandemic.   Will obtain eye exam from [facility].

## 2020-07-07 LAB
ALBUMIN SERPL-MCNC: 4.5 G/DL (ref 3.5–5.2)
ALBUMIN/CREAT UR: 10 MG/G CREAT (ref 0–29)
ALBUMIN/GLOB SERPL: 1.6 G/DL
ALP SERPL-CCNC: 49 U/L (ref 39–117)
ALT SERPL-CCNC: 26 U/L (ref 1–41)
AST SERPL-CCNC: 17 U/L (ref 1–40)
BASOPHILS # BLD AUTO: 0.05 10*3/MM3 (ref 0–0.2)
BASOPHILS NFR BLD AUTO: 0.8 % (ref 0–1.5)
BILIRUB SERPL-MCNC: 0.4 MG/DL (ref 0–1.2)
BUN SERPL-MCNC: 13 MG/DL (ref 8–23)
BUN/CREAT SERPL: 13.7 (ref 7–25)
CALCIUM SERPL-MCNC: 8.8 MG/DL (ref 8.6–10.5)
CHLORIDE SERPL-SCNC: 105 MMOL/L (ref 98–107)
CHOLEST SERPL-MCNC: 194 MG/DL (ref 0–200)
CO2 SERPL-SCNC: 27.1 MMOL/L (ref 22–29)
CREAT SERPL-MCNC: 0.95 MG/DL (ref 0.76–1.27)
CREAT UR-MCNC: 159.4 MG/DL
EOSINOPHIL # BLD AUTO: 0.09 10*3/MM3 (ref 0–0.4)
EOSINOPHIL NFR BLD AUTO: 1.5 % (ref 0.3–6.2)
ERYTHROCYTE [DISTWIDTH] IN BLOOD BY AUTOMATED COUNT: 13.1 % (ref 12.3–15.4)
GLOBULIN SER CALC-MCNC: 2.8 GM/DL
GLUCOSE SERPL-MCNC: 169 MG/DL (ref 65–99)
HBA1C MFR BLD: 6.9 % (ref 4.8–5.6)
HCT VFR BLD AUTO: 41.2 % (ref 37.5–51)
HDLC SERPL-MCNC: 44 MG/DL (ref 40–60)
HGB BLD-MCNC: 13.9 G/DL (ref 13–17.7)
IMM GRANULOCYTES # BLD AUTO: 0.01 10*3/MM3 (ref 0–0.05)
IMM GRANULOCYTES NFR BLD AUTO: 0.2 % (ref 0–0.5)
LDLC SERPL CALC-MCNC: 139 MG/DL (ref 0–100)
LYMPHOCYTES # BLD AUTO: 2 10*3/MM3 (ref 0.7–3.1)
LYMPHOCYTES NFR BLD AUTO: 32.7 % (ref 19.6–45.3)
MCH RBC QN AUTO: 30.8 PG (ref 26.6–33)
MCHC RBC AUTO-ENTMCNC: 33.7 G/DL (ref 31.5–35.7)
MCV RBC AUTO: 91.4 FL (ref 79–97)
MICROALBUMIN UR-MCNC: 16.3 UG/ML
MONOCYTES # BLD AUTO: 0.47 10*3/MM3 (ref 0.1–0.9)
MONOCYTES NFR BLD AUTO: 7.7 % (ref 5–12)
NEUTROPHILS # BLD AUTO: 3.49 10*3/MM3 (ref 1.7–7)
NEUTROPHILS NFR BLD AUTO: 57.1 % (ref 42.7–76)
NRBC BLD AUTO-RTO: 0 /100 WBC (ref 0–0.2)
PLATELET # BLD AUTO: 206 10*3/MM3 (ref 140–450)
POTASSIUM SERPL-SCNC: 4.5 MMOL/L (ref 3.5–5.2)
PROT SERPL-MCNC: 7.3 G/DL (ref 6–8.5)
PSA SERPL-MCNC: 1.13 NG/ML (ref 0–4)
RBC # BLD AUTO: 4.51 10*6/MM3 (ref 4.14–5.8)
SODIUM SERPL-SCNC: 139 MMOL/L (ref 136–145)
TRIGL SERPL-MCNC: 54 MG/DL (ref 0–150)
TSH SERPL DL<=0.005 MIU/L-ACNC: 1.2 UIU/ML (ref 0.27–4.2)
VLDLC SERPL CALC-MCNC: 10.8 MG/DL
WBC # BLD AUTO: 6.11 10*3/MM3 (ref 3.4–10.8)

## 2020-07-14 ENCOUNTER — TELEPHONE (OUTPATIENT)
Dept: INTERNAL MEDICINE | Facility: CLINIC | Age: 65
End: 2020-07-14

## 2020-07-14 DIAGNOSIS — E08.00 DIABETES MELLITUS DUE TO UNDERLYING CONDITION WITH HYPEROSMOLARITY WITHOUT COMA, WITHOUT LONG-TERM CURRENT USE OF INSULIN (HCC): ICD-10-CM

## 2020-07-14 NOTE — TELEPHONE ENCOUNTER
PATIENT CALLED AND STATES HE WAS SEEN BY MARGE TERRY TODAY AND HAD MED REFILL FOR HIS TEST STRIPS.   HE NEEDS THE ONE TOUCH TEST STRIPS.  HE WAS GIVEN ACCU-CHECK  TEST STRIPS    PLEASE SEND ONE TOUCH TEST STRIPS TO     [facility] #[id number] -   [place], [place] - [address] AT [address] [place] [address] - [phone]  - [phone]   [phone]    PATIENT CALL BACK NUMBER [phone]

## 2020-09-07 DIAGNOSIS — E08.00 DIABETES MELLITUS DUE TO UNDERLYING CONDITION WITH HYPEROSMOLARITY WITHOUT COMA, WITHOUT LONG-TERM CURRENT USE OF INSULIN (HCC): ICD-10-CM

## 2020-09-08 RX ORDER — METFORMIN HYDROCHLORIDE 500 MG/1
TABLET, EXTENDED RELEASE ORAL
Qty: 90 TABLET | Refills: 1 | Status: SHIPPED | OUTPATIENT
Start: 2020-09-08 | End: 2021-03-04

## 2020-09-14 DIAGNOSIS — R10.84 GENERALIZED ABDOMINAL PAIN: ICD-10-CM

## 2020-09-14 RX ORDER — PANTOPRAZOLE SODIUM 40 MG/1
TABLET, DELAYED RELEASE ORAL
Qty: 180 TABLET | Refills: 1 | Status: SHIPPED | OUTPATIENT
Start: 2020-09-14 | End: 2021-03-04 | Stop reason: SDUPTHER

## 2020-11-10 NOTE — TELEPHONE ENCOUNTER
----- Message from Agustina Schroeder sent at 2/5/2018  2:20 PM EST -----  Contact: Patient  Patient called in requesting copies of his flu and pneumonia vaccines be mailed to him.  Please advise.    [address] [place] [address]    Pt# [phone]      
I tried to call pt, was unable to reach. I mailed him clinical summery which stating we gave him flu and pneumonia .   
[FreeTextEntry1] : GI evaluation\par \par Return to office fasting for blood tests\par \par ADA diet\par \par

## 2020-11-17 DIAGNOSIS — R21 RASH: ICD-10-CM

## 2020-11-17 NOTE — TELEPHONE ENCOUNTER
Patient requesting a refill on his:    Diprolene 0.05 % cream - he uses as needed.      [id number] - [place], [place] - [address] [place] [address] AT [address] [place] [address] - [phone]  - [phone] (Phone)  [phone] (Fax)

## 2020-11-19 RX ORDER — BETAMETHASONE DIPROPIONATE 0.5 MG/G
1 CREAM TOPICAL AS NEEDED
Qty: 15 G | Refills: 1 | Status: SHIPPED | OUTPATIENT
Start: 2020-11-19 | End: 2022-05-26 | Stop reason: SDUPTHER

## 2020-11-19 NOTE — TELEPHONE ENCOUNTER
Caller: Bob Biggs Sr.    Relationship: Self    Best call back number: [phone]    Medication needed:   Requested Prescriptions     Pending Prescriptions Disp Refills   • betamethasone dipropionate 0.05 % cream 15 g 1     Sig: Apply 1 application topically to the appropriate area as directed As Needed for Rash.       When do you need the refill by: 11/19/20    What details did the patient provide when requesting the medication: pt requested refill on 11/17 with no response.    Does the patient have less than a 3 day supply:  [x] Yes  [] No    What is the patient's preferred pharmacy:    [facility] #[id number] - [address] AT [address] [place] [address] - [phone] [facility] [phone]   [phone]

## 2020-12-14 ENCOUNTER — OFFICE VISIT (OUTPATIENT)
Dept: INTERNAL MEDICINE | Facility: CLINIC | Age: 65
End: 2020-12-14

## 2020-12-14 DIAGNOSIS — J06.9 UPPER RESPIRATORY TRACT INFECTION, UNSPECIFIED TYPE: ICD-10-CM

## 2020-12-14 DIAGNOSIS — R05.9 COUGH: Primary | ICD-10-CM

## 2020-12-14 PROCEDURE — 99441 PR PHYS/QHP TELEPHONE EVALUATION 5-10 MIN: CPT | Performed by: NURSE PRACTITIONER

## 2020-12-14 RX ORDER — AZITHROMYCIN 250 MG/1
TABLET, FILM COATED ORAL
Qty: 6 TABLET | Refills: 0 | Status: SHIPPED | OUTPATIENT
Start: 2020-12-14 | End: 2020-12-20

## 2020-12-14 RX ORDER — BENZONATATE 200 MG/1
200 CAPSULE ORAL 3 TIMES DAILY PRN
Qty: 30 CAPSULE | Refills: 0 | Status: SHIPPED | OUTPATIENT
Start: 2020-12-14 | End: 2021-02-17

## 2020-12-14 NOTE — PROGRESS NOTES
Subjective     Bob Biggs Sr. is a 65 y.o. male.         You have chosen to receive care through a telephone visit. Do you consent to use a telephone visit for your medical care today? Yes    Pt presents with sore throat, productive cough started 2 days ago, fatigue for the last week. He was just tested for Covid today. No fever, SOB, loss of taste of smell, no exposure.  He has been using Mucinex, coricidin, theraflu and sudafed OTC with mild relief.         The following portions of the patient's history were reviewed and updated as appropriate: allergies, current medications, past social history and problem list.    Past Medical History:   Diagnosis Date   • Arthritis    • Diabetes mellitus (CMS/[facility])    • Finger fracture, left 09/22/2018   • GERD (gastroesophageal reflux disease)    • Painful swelling of joint    • Tear of meniscus of knee          Current Outpatient Medications:   •  acetaminophen (TYLENOL) 650 MG 8 hr tablet, Take 650 mg by mouth Every 8 (Eight) Hours As Needed for Mild Pain ., Disp: , Rfl:   •  betamethasone dipropionate 0.05 % cream, Apply 1 application topically to the appropriate area as directed As Needed for Rash., Disp: 15 g, Rfl: 1  •  cetirizine (zyrTEC) 10 MG tablet, Take 10 mg by mouth Daily., Disp: , Rfl:   •  cyclobenzaprine (FLEXERIL) 10 MG tablet, Take 1 tablet by mouth 3 (Three) Times a Day As Needed for Muscle Spasms., Disp: 30 tablet, Rfl: 1  •  glucose blood test strip, CHECK BLOOD SUGAR 2 TIMES PER DAY, Disp: 200 each, Rfl: 3  •  guaiFENesin (MUCINEX) 600 MG 12 hr tablet, Take 1,200 mg by mouth As Needed for Cough., Disp: , Rfl:   •  influenza vac split quad (Flulaval Quadrivalent) 0.5 ML suspension prefilled syringe injection, Inject  into the appropriate muscle as directed by prescriber., Disp: 0.5 mL, Rfl: 0  •  metFORMIN ER (GLUCOPHAGE-XR) 500 MG 24 hr tablet, TAKE 1 TABLET BY MOUTH DAILY WITH BREAKFAST, Disp: 90 tablet, Rfl: 1  •  Multiple Vitamin (ONE-A-DAY 55  PLUS PO), Take 1 tablet by mouth Daily., Disp: , Rfl:   •  ONE TOUCH LANCETS misc, 1 each 2 (Two) Times a Day., Disp: 200 each, Rfl: 0  •  pantoprazole (PROTONIX) 40 MG EC tablet, TAKE 1 TABLET BY MOUTH TWICE DAILY, Disp: 180 tablet, Rfl: 1  •  valACYclovir (VALTREX) 500 MG tablet, TAKE 1 TABLET EVERY DAY, Disp: 90 tablet, Rfl: 2    No Known Allergies    Review of Systems   Constitutional: Positive for fatigue.   HENT: Positive for congestion and sore throat.    Respiratory: Positive for cough. Negative for shortness of breath.    Cardiovascular: Negative for palpitations.       Objective     There were no vitals taken for this visit.  Wt Readings from Last 3 Encounters:   07/06/20 91.6 kg (202 lb)   01/21/20 92.5 kg (204 lb)   07/26/19 90.7 kg (200 lb)     Temp Readings from Last 3 Encounters:   07/26/19 98.2 °F (36.8 °C) (Temporal)   04/08/19 98.7 °F (37.1 °C) (Oral)   01/28/19 97 °F (36.1 °C) (Temporal)     BP Readings from Last 3 Encounters:   07/06/20 154/80   01/21/20 142/90   06/10/19 113/54     Pulse Readings from Last 3 Encounters:   01/21/20 97   06/10/19 76   04/08/19 88       Physical Exam  Neurological:      Mental Status: He is alert and oriented to person, place, and time.   Psychiatric:         Mood and Affect: Mood normal.         Behavior: Behavior normal.         Thought Content: Thought content normal.         Judgment: Judgment normal.         Assessment/Plan     Diagnoses and all orders for this visit:    1. Cough (Primary)  -     benzonatate (TESSALON) 200 MG capsule; Take 1 capsule by mouth 3 (Three) Times a Day As Needed for Cough.  Dispense: 30 capsule; Refill: 0    2. Upper respiratory tract infection, unspecified type  -     azithromycin (Zithromax Z-Gm) 250 MG tablet; Take 2 tablets the first day, then 1 tablet daily for 4 days.  Dispense: 6 tablet; Refill: 0    Work note emailed to patient.    He will self quarantine.    He did get Covid tested already today at an outside facility, he  will call the office with his results.    Return for worsening of sx.    I spent 7 minutes on the phone with patient.

## 2020-12-16 ENCOUNTER — TELEPHONE (OUTPATIENT)
Dept: INTERNAL MEDICINE | Facility: CLINIC | Age: 65
End: 2020-12-16

## 2020-12-16 NOTE — TELEPHONE ENCOUNTER
----- Message from KONG Thapa sent at 12/14/2020  9:18 AM EST -----  Please email patient a work note stating that he needs to be off from 12/14 through 12/16 and return to work on 12/17 as long as his Covid test is negative.

## 2020-12-17 ENCOUNTER — TELEPHONE (OUTPATIENT)
Dept: INTERNAL MEDICINE | Facility: CLINIC | Age: 65
End: 2020-12-17

## 2020-12-17 NOTE — TELEPHONE ENCOUNTER
Jennifer Gardiner MD  You 2 hours ago (12:11 PM)     He should get a covid test.    Message text      -12/17/2020 I spoke to Mr. Biggs and he stated he had a COVID test on Monday 12/14/2020 at [facility] and his test was negative.     He was under the impression he had to stop taking his OTC medications while on the abx. He asked if he can start back on his Mucinex, I told him that it should be ok. Mr. Biggs stated he has started back and he's feeling much better.     He understands to proceed to the urgent care if he has SOA or if his cough worsens.

## 2020-12-17 NOTE — TELEPHONE ENCOUNTER
PATIENT CALLED AND STATED THAT HE HAD A TELEPHONE VISIT WITH STEPHENIE GARZA ON 12/14, STEPHENIE PRESCRIBED AN ANTIBIOTIC AND MEDICATION FOR THE PATIENT'S COUGH. THE PATIENT IS ASKING IF HE NEEDS TO TAKE ANOTHER COUGH MEDICATION ALONG WITH THE MEDICATIONS THAT WERE PRESCRIBED, PATIENT STATES THAT HE IS STILL COUGHING, NOT WORSE, BUT THE SAME. THE PATIENT STATES THAT WHEN HE COUGHS IT MAKES HIS HEAD HURT.  PATIENT REQUESTING A CALLBACK.    PATIENT CALLBACK: [phone]

## 2021-02-15 ENCOUNTER — OFFICE VISIT (OUTPATIENT)
Dept: INTERNAL MEDICINE | Facility: CLINIC | Age: 66
End: 2021-02-15

## 2021-02-15 ENCOUNTER — HOSPITAL ENCOUNTER (OUTPATIENT)
Dept: GENERAL RADIOLOGY | Facility: HOSPITAL | Age: 66
Discharge: HOME | End: 2021-02-15
Admitting: NURSE PRACTITIONER

## 2021-02-15 VITALS
OXYGEN SATURATION: 96 % | WEIGHT: 206 LBS | DIASTOLIC BLOOD PRESSURE: 84 MMHG | SYSTOLIC BLOOD PRESSURE: 144 MMHG | HEIGHT: 71 IN | RESPIRATION RATE: 17 BRPM | BODY MASS INDEX: 28.84 KG/M2 | TEMPERATURE: 98.2 F | HEART RATE: 96 BPM

## 2021-02-15 DIAGNOSIS — E08.00 DIABETES MELLITUS DUE TO UNDERLYING CONDITION WITH HYPEROSMOLARITY WITHOUT COMA, WITHOUT LONG-TERM CURRENT USE OF INSULIN (HCC): ICD-10-CM

## 2021-02-15 DIAGNOSIS — Z00.00 ANNUAL PHYSICAL EXAM: Primary | ICD-10-CM

## 2021-02-15 DIAGNOSIS — I10 ESSENTIAL HYPERTENSION: ICD-10-CM

## 2021-02-15 DIAGNOSIS — M25.511 CHRONIC RIGHT SHOULDER PAIN: ICD-10-CM

## 2021-02-15 DIAGNOSIS — G89.29 CHRONIC RIGHT SHOULDER PAIN: ICD-10-CM

## 2021-02-15 PROCEDURE — 99397 PER PM REEVAL EST PAT 65+ YR: CPT | Performed by: NURSE PRACTITIONER

## 2021-02-15 PROCEDURE — 73030 X-RAY EXAM OF SHOULDER: CPT

## 2021-02-15 RX ORDER — LOSARTAN POTASSIUM 50 MG/1
50 TABLET ORAL DAILY
Qty: 30 TABLET | Refills: 1 | Status: SHIPPED | OUTPATIENT
Start: 2021-02-15 | End: 2021-03-04 | Stop reason: SDUPTHER

## 2021-02-15 RX ORDER — IBUPROFEN 800 MG/1
800 TABLET ORAL EVERY 6 HOURS PRN
COMMUNITY
Start: 2021-02-04

## 2021-02-15 RX ORDER — LOSARTAN POTASSIUM 50 MG/1
50 TABLET ORAL DAILY
Qty: 90 TABLET | OUTPATIENT
Start: 2021-02-15

## 2021-02-15 NOTE — PROGRESS NOTES
Angella Biggs Sr. is a 66 y.o. male.     .Well Adult Physical   Patient here for a comprehensive physical exam.The patient reports right shoulder pain    He is working full time. He eats healthy diet, however not following low carb/sugar diet. He is up to date with dental and vision exam. His last PSA 7/6/2020.     He has been monitoring blood sugar periodically and readings around 130. He denies any episodes of hypoglycemia.     He is having back pain and right hernia pain that he is being evaluated for by his job (Workers comp). He is due to see specialist this upcoming Wednesday.     Shoulder Injury   The right shoulder is affected. The incident occurred more than 1 week ago (since 2019, no changes ). There was no injury mechanism. The quality of the pain is described as aching (locked ). The pain does not radiate. The pain is at a severity of 3/10. The pain is mild. Pertinent negatives include no chest pain, muscle weakness, numbness or tingling. The symptoms are aggravated by overhead lifting and movement. He has tried acetaminophen (aspercreme ) for the symptoms. The treatment provided moderate relief.        The following portions of the patient's history were reviewed and updated as appropriate: allergies, current medications, past family history, past medical history, past social history, past surgical history and problem list.    Review of Systems   Constitutional: Negative for activity change, appetite change, chills, fatigue, fever and unexpected weight change.   HENT: Negative for congestion, ear discharge, ear pain, hearing loss, mouth sores, nosebleeds, postnasal drip, rhinorrhea, sinus pressure, sneezing, sore throat, tinnitus and voice change.    Eyes: Positive for visual disturbance (up to date, wears glasses ).   Respiratory: Negative for cough, chest tightness, shortness of breath and wheezing.    Cardiovascular: Negative for chest pain, palpitations and leg swelling.    Gastrointestinal: Negative for abdominal distention, abdominal pain, anal bleeding, blood in stool, constipation, diarrhea, nausea and vomiting.   Endocrine: Negative for cold intolerance, heat intolerance, polydipsia, polyphagia and polyuria.   Genitourinary: Negative for difficulty urinating, discharge, frequency, hematuria, penile pain, penile swelling, scrotal swelling, testicular pain and urgency.        Right groin hernia-due to be evaluated by workers comp provider.    Musculoskeletal: Positive for arthralgias (right shoulder, chronic) and back pain (acute, right lower back, being evaluated by workers comp provider). Negative for gait problem, joint swelling, myalgias, neck pain and neck stiffness.   Skin: Negative for color change, pallor and rash.   Neurological: Negative for dizziness, tingling, tremors, seizures, syncope, speech difficulty, weakness, light-headedness, numbness and headaches.   Hematological: Negative for adenopathy. Does not bruise/bleed easily.   Psychiatric/Behavioral: Negative for confusion, decreased concentration, dysphoric mood, sleep disturbance and suicidal ideas. The patient is not nervous/anxious.        Objective   Physical Exam  Constitutional:       Appearance: He is well-developed.   HENT:      Head: Normocephalic.      Right Ear: Hearing, tympanic membrane, ear canal and external ear normal.      Left Ear: Hearing, tympanic membrane, ear canal and external ear normal.   Eyes:      General: Lids are normal.      Extraocular Movements: Extraocular movements intact.      Pupils: Pupils are equal, round, and reactive to light.   Neck:      Musculoskeletal: Normal range of motion.      Thyroid: No thyromegaly.      Vascular: No carotid bruit.      Trachea: No tracheal deviation.   Cardiovascular:      Rate and Rhythm: Normal rate and regular rhythm.      Pulses: Normal pulses.      Heart sounds: Normal heart sounds. No murmur. No friction rub. No gallop.       Comments: Repeat  bp left arm 152/82    Pulmonary:      Effort: No respiratory distress.      Breath sounds: Normal breath sounds. No wheezing or rales.   Chest:      Chest wall: No tenderness.   Abdominal:      General: Bowel sounds are normal. There is no distension.      Palpations: Abdomen is soft.      Tenderness: There is no abdominal tenderness.      Hernia: A hernia is present. Hernia is present in the right inguinal area. There is no hernia in the left inguinal area.   Genitourinary:     Penis: Normal.       Scrotum/Testes: Normal.      Epididymis:      Right: Normal.      Left: Normal.      Rectum: Guaiac result negative.   Musculoskeletal:         General: No deformity.      Lumbar back: He exhibits decreased range of motion and tenderness. He exhibits no pain and no spasm.        Back:       Right lower leg: No edema.      Left lower leg: No edema.      Comments: (-) SLR    Lymphadenopathy:      Head:      Right side of head: No submental, submandibular, tonsillar, preauricular, posterior auricular or occipital adenopathy.      Left side of head: No submental, submandibular, tonsillar, preauricular, posterior auricular or occipital adenopathy.      Cervical: No cervical adenopathy.      Lower Body: No right inguinal adenopathy. No left inguinal adenopathy.   Skin:     General: Skin is warm.      Coloration: Skin is not pale.      Findings: No erythema.   Neurological:      Mental Status: He is alert and oriented to person, place, and time.      Cranial Nerves: No cranial nerve deficit.      Motor: No abnormal muscle tone.      Coordination: Coordination normal.      Gait: Gait is intact.      Deep Tendon Reflexes: Reflexes normal.      Reflex Scores:       Patellar reflexes are 2+ on the right side and 1+ on the left side.  Psychiatric:         Mood and Affect: Mood normal.         Speech: Speech normal.         Behavior: Behavior normal.         Thought Content: Thought content normal.         Cognition and Memory:  Cognition and memory normal.         Judgment: Judgment normal.         Assessment/Plan   Diagnoses and all orders for this visit:    1. Annual physical exam (Primary)  -     Comprehensive Metabolic Panel  -     Lipid Panel  -     CBC & Differential  -     TSH Rfx On Abnormal To Free T4    2. Essential hypertension  -     losartan (Cozaar) 50 MG tablet; Take 1 tablet by mouth Daily.  Dispense: 30 tablet; Refill: 1    3. Diabetes mellitus due to underlying condition with hyperosmolarity without coma, without long-term current use of insulin (CMS/[facility])  -     Hemoglobin A1c    4. Chronic right shoulder pain  -     XR Shoulder 2+ View Right      I will complete wellness  paperwork once available.   He was advised to cardio and healthy diet (low carb/sugar)  He was reminded of covid precautions-hand hygiene, social distancing and face mask.     HTN: he is finally agreeable to starting medication; he was advised to low salt diet; goal of bp less than 130/80; he will bring machine and readings to 4 week f/u  DM: will check A1c today; tolerating metformin  Right shoulder: chronic, he request imaging; will send to hospital due to radiology down in office.

## 2021-02-16 LAB
ALBUMIN SERPL-MCNC: 4.4 G/DL (ref 3.5–5.2)
ALBUMIN/GLOB SERPL: 1.6 G/DL
ALP SERPL-CCNC: 55 U/L (ref 39–117)
ALT SERPL-CCNC: 28 U/L (ref 1–41)
AST SERPL-CCNC: 25 U/L (ref 1–40)
BASOPHILS # BLD AUTO: 0.06 10*3/MM3 (ref 0–0.2)
BASOPHILS NFR BLD AUTO: 0.9 % (ref 0–1.5)
BILIRUB SERPL-MCNC: 0.5 MG/DL (ref 0–1.2)
BUN SERPL-MCNC: 17 MG/DL (ref 8–23)
BUN/CREAT SERPL: 17.2 (ref 7–25)
CALCIUM SERPL-MCNC: 9.1 MG/DL (ref 8.6–10.5)
CHLORIDE SERPL-SCNC: 103 MMOL/L (ref 98–107)
CHOLEST SERPL-MCNC: 189 MG/DL (ref 0–200)
CO2 SERPL-SCNC: 25.7 MMOL/L (ref 22–29)
CREAT SERPL-MCNC: 0.99 MG/DL (ref 0.76–1.27)
EOSINOPHIL # BLD AUTO: 0.1 10*3/MM3 (ref 0–0.4)
EOSINOPHIL NFR BLD AUTO: 1.5 % (ref 0.3–6.2)
ERYTHROCYTE [DISTWIDTH] IN BLOOD BY AUTOMATED COUNT: 13.5 % (ref 12.3–15.4)
GLOBULIN SER CALC-MCNC: 2.8 GM/DL
GLUCOSE SERPL-MCNC: 172 MG/DL (ref 65–99)
HBA1C MFR BLD: 6.8 % (ref 4.8–5.6)
HCT VFR BLD AUTO: 44.4 % (ref 37.5–51)
HDLC SERPL-MCNC: 43 MG/DL (ref 40–60)
HGB BLD-MCNC: 14.2 G/DL (ref 13–17.7)
IMM GRANULOCYTES # BLD AUTO: 0.01 10*3/MM3 (ref 0–0.05)
IMM GRANULOCYTES NFR BLD AUTO: 0.1 % (ref 0–0.5)
LDLC SERPL CALC-MCNC: 137 MG/DL (ref 0–100)
LYMPHOCYTES # BLD AUTO: 2.18 10*3/MM3 (ref 0.7–3.1)
LYMPHOCYTES NFR BLD AUTO: 31.6 % (ref 19.6–45.3)
MCH RBC QN AUTO: 30.3 PG (ref 26.6–33)
MCHC RBC AUTO-ENTMCNC: 32 G/DL (ref 31.5–35.7)
MCV RBC AUTO: 94.9 FL (ref 79–97)
MONOCYTES # BLD AUTO: 0.53 10*3/MM3 (ref 0.1–0.9)
MONOCYTES NFR BLD AUTO: 7.7 % (ref 5–12)
NEUTROPHILS # BLD AUTO: 4.01 10*3/MM3 (ref 1.7–7)
NEUTROPHILS NFR BLD AUTO: 58.2 % (ref 42.7–76)
NRBC BLD AUTO-RTO: 0 /100 WBC (ref 0–0.2)
PLATELET # BLD AUTO: 195 10*3/MM3 (ref 140–450)
POTASSIUM SERPL-SCNC: 4.4 MMOL/L (ref 3.5–5.2)
PROT SERPL-MCNC: 7.2 G/DL (ref 6–8.5)
RBC # BLD AUTO: 4.68 10*6/MM3 (ref 4.14–5.8)
SODIUM SERPL-SCNC: 140 MMOL/L (ref 136–145)
TRIGL SERPL-MCNC: 49 MG/DL (ref 0–150)
TSH SERPL DL<=0.005 MIU/L-ACNC: 1.82 UIU/ML (ref 0.27–4.2)
VLDLC SERPL CALC-MCNC: 9 MG/DL (ref 5–40)
WBC # BLD AUTO: 6.89 10*3/MM3 (ref 3.4–10.8)

## 2021-02-17 ENCOUNTER — OFFICE VISIT (OUTPATIENT)
Dept: SURGERY | Facility: CLINIC | Age: 66
End: 2021-02-17

## 2021-02-17 VITALS — BODY MASS INDEX: 29.18 KG/M2 | HEIGHT: 70 IN | WEIGHT: 203.8 LBS

## 2021-02-17 DIAGNOSIS — K40.90 RIGHT INGUINAL HERNIA: Primary | ICD-10-CM

## 2021-02-17 PROCEDURE — 99203 OFFICE O/P NEW LOW 30 MIN: CPT | Performed by: SURGERY

## 2021-02-17 RX ORDER — CEFAZOLIN SODIUM 2 G/100ML
2 INJECTION, SOLUTION INTRAVENOUS ONCE
Status: CANCELLED | OUTPATIENT
Start: 2021-03-23 | End: 2021-02-17

## 2021-02-17 RX ORDER — SODIUM CHLORIDE 0.9 % (FLUSH) 0.9 %
10 SYRINGE (ML) INJECTION AS NEEDED
Status: CANCELLED | OUTPATIENT
Start: 2021-03-23

## 2021-02-17 RX ORDER — SODIUM CHLORIDE 0.9 % (FLUSH) 0.9 %
3 SYRINGE (ML) INJECTION EVERY 12 HOURS SCHEDULED
Status: CANCELLED | OUTPATIENT
Start: 2021-03-23

## 2021-02-17 RX ORDER — DOCUSATE SODIUM 100 MG/1
100 CAPSULE, LIQUID FILLED ORAL 2 TIMES DAILY
Qty: 60 CAPSULE | Refills: 1 | Status: SHIPPED | OUTPATIENT
Start: 2021-02-17 | End: 2021-04-29

## 2021-02-17 NOTE — PROGRESS NOTES
Cc: Right inguinal hernia    History of presenting illness:   This is a very nice 66-year-old gentleman with a history of diabetes and hypertension who says that around 2 weeks ago he was at work and lifted something which she did not anticipated to be full and noticed both back pain as well as some right groin pain associated with that lifting.  Subsequently he noticed some swelling in the right groin which seems to regress when he lays flat and is exacerbated by more strenuous activity.  The pain is constant but does wax and wane to some extent and its intensity.  There is some radiation into the testicle.    Past Medical History: Hypertension, type 2 diabetes, gastroesophageal reflux disease, arthritis    Past Surgical History: Multiple orthopedic procedures including left total knee arthroplasty and previous knee arthroscopy.  History of colonoscopy in 2008 in 2019.  Remote hydrocelectomy, patient is not sure what side, or if both were operated on.    Medications: Zyrtec, Flexeril, Mucinex, Advil, losartan, Metformin, Protonix, Valtrex    Allergies: None known    Social History: He is a non-smoker, works a job which requires extensive physical activity    Family History: Negative for known colorectal cancer, diabetes and pancreatic cancer in his brother    Review of Systems:  Constitutional: Negative for fever, chills, change in weight  Neck: no swollen glands or dysphagia or odynophagia  Respiratory: negative for SOB, cough, hemoptysis or wheezing  Cardiovascular: negative for chest pain, palpitations or peripheral edema  Gastrointestinal: Negative for nausea, vomiting, abdominal pain, positive for constipation      Physical Exam:  BMI: Body mass index 29.3  General: alert and oriented, appropriate, no acute distress  Eyes: No scleral icterus, extraocular movements are intact  Neck: Supple without lymphadenopathy or thyromegaly, trachea is in the midline  Respiratory: There is good bilateral chest expansion, no  use of accessory muscles is noted  Cardiovascular: No jugular venous distention or peripheral edema is seen  Gastrointestinal: Soft, benign, no mass or hernia in the ventral region.  There is a small scar just to the right of the umbilicus where he had previously had an abscess.  Genitourinary: Normal male external genitalia.  Confirmed positive right inguinal hernia, reducible.  No obvious left inguinal hernia is felt.    Laboratory data: Recent hemoglobin A1c 6.8.    Imaging data: No recent relevant data      Assessment and plan:   -Initial symptomatic and reducible right inguinal hernia  -Type 2 diabetes, well controlled  -Options for management discussed with patient.  I have recommended proceeding with surgical repair.  I have suggested proceeding with da Omar robot assisted right inguinal hernia repair with mesh placement.    Risks associated with the procedure are noted to include, but not be limited to, bleeding, infection, injury to small or large intestine, major vascular structures, the bladder or ureters.  Possibility of postoperative inguinodynia (possibly chronic), mesh migration or infection, hernia recurrence and seroma formation also discussed.      Jimy Hagen MD, FACS  General, Minimally Invasive and Endoscopic Surgery  [facility]    [address] [place] [address], [address]  [place], [place], [address]  P: [phone]  F: [phone]

## 2021-02-18 ENCOUNTER — TRANSCRIBE ORDERS (OUTPATIENT)
Dept: PHYSICAL THERAPY | Facility: CLINIC | Age: 66
End: 2021-02-18

## 2021-02-18 DIAGNOSIS — S39.012A STRAIN OF LUMBAR REGION, INITIAL ENCOUNTER: Primary | ICD-10-CM

## 2021-02-22 ENCOUNTER — TREATMENT (OUTPATIENT)
Dept: PHYSICAL THERAPY | Facility: CLINIC | Age: 66
End: 2021-02-22

## 2021-02-22 DIAGNOSIS — S39.012D STRAIN OF LUMBAR REGION, SUBSEQUENT ENCOUNTER: ICD-10-CM

## 2021-02-22 DIAGNOSIS — M54.50 ACUTE BILATERAL LOW BACK PAIN WITHOUT SCIATICA: Primary | ICD-10-CM

## 2021-02-22 PROCEDURE — 97530 THERAPEUTIC ACTIVITIES: CPT | Performed by: PHYSICAL THERAPIST

## 2021-02-22 PROCEDURE — 97110 THERAPEUTIC EXERCISES: CPT | Performed by: PHYSICAL THERAPIST

## 2021-02-22 PROCEDURE — 97014 ELECTRIC STIMULATION THERAPY: CPT | Performed by: PHYSICAL THERAPIST

## 2021-02-22 PROCEDURE — 97161 PT EVAL LOW COMPLEX 20 MIN: CPT | Performed by: PHYSICAL THERAPIST

## 2021-02-22 NOTE — PROGRESS NOTES
Physical Therapy Initial Evaluation and Plan of Care    Patient: Bob Biggs Sr.   : 1955  Diagnosis/ICD-10 Code:  Acute bilateral low back pain without sciatica [M54.5]  Referring practitioner: KONG Agudelo  Date of Initial Evaluation:  Type: THERAPY  Noted: 2021  _______________________________________________________________________________________________________________________    Subjective Evaluation    History of Present Illness  Date of onset: 2021  Mechanism of injury: Dumping a big bin a couple of times - was heavier than expected.  Had to pul lit back out and felt a strain in back and groin.  Also Dx with inguinal hernia and seeing a specialist for that with surgery to be scheduled.  Reports no prior back significant injuries or surgeries; [facility]      Patient Occupation: Sanitation   Precautions and Work Restrictions: 10-15 lbsPain  Current pain ratin  At best pain ratin  At worst pain ratin  Location: R>L thoraco-lumbar  Quality: pressure and dull ache  Relieving factors: medications and heat  Aggravating factors: prolonged positioning, stairs and lifting (bending over)  Progression: improved    Diagnostic Tests  No diagnostic tests performed    Treatments  Previous treatment: medication  Current treatment: physical therapy  Patient Goals  Patient goals for therapy: decreased pain  Patient goal: improve posture           Objective          Postural Observations  Seated posture: fair  Standing posture: fair    Additional Postural Observation Details  Decreased sp curves    Palpation   Left   Tenderness of the erector spinae and quadratus lumborum.     Right   Hypertonic in the erector spinae and quadratus lumborum. Tenderness of the erector spinae and quadratus lumborum.     Active Range of Motion     Lumbar   Flexion: WFL and with pain  Extension: WFL    Additional Active Range of Motion Details  SB and ROT 50-75% B with ipsilat pain and R side pain with ROT  B    Strength/Myotome Testing     Additional Strength Details  No LE sxs; NT core strength due to hernia    Tests     Lumbar     Left   Negative passive SLR.     Right   Negative passive SLR.     Additional Tests Details  Just HS tightness and LBP with SLR; groin pain with Magy R>L        NOTE: Rx included IFC e-stim to B thoraco-lumbar in hooklying with MH      Assessment & Plan     Assessment  Impairments: abnormal muscle tone, abnormal or restricted ROM, impaired physical strength and pain with function  Assessment details: 65 yo m with acute onset pain from handing a heavy bin and also with concurrent hernia presents with signs/sxs lumbar strain without internal derangement lumbar.  Can benefit from conservative PT to help resolve strain and return to normal function (after hernia addressed and resolved).  Prognosis: good  Prognosis details: For lumbar  Functional Limitations: lifting, sleeping, pulling, pushing, uncomfortable because of pain and sitting  Goals  Plan Goals: STGs (2 wks)    1. Patient demonstrates kelly for and compliance with HEP and precautions/restrictions  2. Patient reports decreased lumbar pain to 4/10 for improved ROM and ADLs  3. Review posture and body mechanics with basic ADLs    LTGs (4 wks)    1. Patient demonstrates independence with HEP and body mechanics for prevention  2. ROM WFL with min to no pain  3. Min to no tenderness or abnormal mm tone  4. Demos kelly for release to RTW with regard to lumbar (note that other restrictions may still be in force for hernia repair/recovery)      Plan  Therapy options: will be seen for skilled physical therapy services  Planned modality interventions: thermotherapy (hydrocollator packs), electrical stimulation/Dutch stimulation and ultrasound  Planned therapy interventions: therapeutic activities, stretching, strengthening and home exercise program (conservative due to concurrent hernia)  Frequency: 3x week  Duration in weeks: 4  Treatment plan  discussed with: patient          Manual Therapy:    0     mins  30885;  Therapeutic Exercise:    12     mins  93160;     Neuromuscular Cirilo:    0    mins  61617;    Therapeutic Activity:     12     mins  59512;       E-stim                 15   mins 43224    Timed Treatment:   24   mins   Total Treatment:     58   mins    PT SIGNATURE: Mayuri Mauricio, PT   DATE TREATMENT INITIATED: 2/22/2021        Initial Certification  Certification Period: 5/23/2021  I certify that the therapy services are furnished while this patient is under my care.  The services outlined above are required by this patient, and will be reviewed every 90 days.     PHYSICIAN: Chandrika James, APRN      DATE:     Please sign and return via fax to [phone].. Thank you, [facility].

## 2021-02-24 ENCOUNTER — TREATMENT (OUTPATIENT)
Dept: PHYSICAL THERAPY | Facility: CLINIC | Age: 66
End: 2021-02-24

## 2021-02-24 DIAGNOSIS — S39.012D STRAIN OF LUMBAR REGION, SUBSEQUENT ENCOUNTER: ICD-10-CM

## 2021-02-24 DIAGNOSIS — M54.50 ACUTE BILATERAL LOW BACK PAIN WITHOUT SCIATICA: Primary | ICD-10-CM

## 2021-02-24 PROCEDURE — 97014 ELECTRIC STIMULATION THERAPY: CPT | Performed by: PHYSICAL THERAPIST

## 2021-02-24 PROCEDURE — 97110 THERAPEUTIC EXERCISES: CPT | Performed by: PHYSICAL THERAPIST

## 2021-02-24 NOTE — PROGRESS NOTES
Physical Therapy Daily Progress Note      Visit # 2      Subjective   Better.  The extra pillow we suggested helped with sleeping.    Objective   See Exercise, Manual, and Modality Logs for complete treatment.       Assessment/Plan    Responding favorably to conservative PT so far with dec c/o pain and improved sleep.    Continue with conservative Rx per POC as kelly             Manual Therapy:    0     mins  64372;  Therapeutic Exercise:    20     mins  43996;     Estim   15 min 35947      Timed Treatment:   20   mins   Total Treatment:     35   mins    Mayuri Mauricio PT  Physical Therapist  [place] License #[id number]

## 2021-02-25 PROBLEM — K40.90 RIGHT INGUINAL HERNIA: Status: ACTIVE | Noted: 2021-02-25

## 2021-02-26 ENCOUNTER — TREATMENT (OUTPATIENT)
Dept: PHYSICAL THERAPY | Facility: CLINIC | Age: 66
End: 2021-02-26

## 2021-02-26 DIAGNOSIS — M54.50 ACUTE BILATERAL LOW BACK PAIN WITHOUT SCIATICA: Primary | ICD-10-CM

## 2021-02-26 DIAGNOSIS — S39.012D STRAIN OF LUMBAR REGION, SUBSEQUENT ENCOUNTER: ICD-10-CM

## 2021-02-26 PROCEDURE — 97014 ELECTRIC STIMULATION THERAPY: CPT | Performed by: PHYSICAL THERAPIST

## 2021-02-26 PROCEDURE — 97110 THERAPEUTIC EXERCISES: CPT | Performed by: PHYSICAL THERAPIST

## 2021-02-26 NOTE — PROGRESS NOTES
Physical Therapy Daily Progress Note      Visit # 3      Subjective   Getting a little relief but not really doing much.  Hernia surgery scheduled for March 23.    Objective   See Exercise, Manual, and Modality Logs for complete treatment.       Assessment/Plan    LBP staying low with relief with modalities and modifications to ADLs.    Continue with conservative Rx due to hernia             Manual Therapy:    0     mins  86423;  Therapeutic Exercise:    23     mins  57513;       Estim   15 min 82601      Timed Treatment:   23   mins   Total Treatment:     38   mins    Mayuri Mauricio PT  Physical Therapist  [place] License #[id number]

## 2021-03-01 ENCOUNTER — TREATMENT (OUTPATIENT)
Dept: PHYSICAL THERAPY | Facility: CLINIC | Age: 66
End: 2021-03-01

## 2021-03-01 DIAGNOSIS — S39.012D STRAIN OF LUMBAR REGION, SUBSEQUENT ENCOUNTER: ICD-10-CM

## 2021-03-01 DIAGNOSIS — M54.50 ACUTE BILATERAL LOW BACK PAIN WITHOUT SCIATICA: Primary | ICD-10-CM

## 2021-03-01 PROCEDURE — 97014 ELECTRIC STIMULATION THERAPY: CPT | Performed by: PHYSICAL THERAPIST

## 2021-03-01 PROCEDURE — 97110 THERAPEUTIC EXERCISES: CPT | Performed by: PHYSICAL THERAPIST

## 2021-03-01 RX ORDER — VALACYCLOVIR HYDROCHLORIDE 500 MG/1
TABLET, FILM COATED ORAL
Qty: 90 TABLET | Refills: 0 | Status: SHIPPED | OUTPATIENT
Start: 2021-03-01 | End: 2021-03-04 | Stop reason: SDUPTHER

## 2021-03-01 NOTE — PROGRESS NOTES
Physical Therapy Daily Progress Note      Visit # 4      Subjective   My back is getting better    Objective   See Exercise, Manual, and Modality Logs for complete treatment.       Assessment/Plan    C/o LBP continue to decrease.  Relief with modalities    Continue x 1 - review HEP and precautions             Manual Therapy:    0     mins  52724;  Therapeutic Exercise:    20     mins  91863;     Estim   15 min [id number]      Timed Treatment:   20   mins   Total Treatment:     35   mins    Mayuri Mauricio PT  Physical Therapist  [place] License #[id number]

## 2021-03-03 ENCOUNTER — TELEPHONE (OUTPATIENT)
Dept: INTERNAL MEDICINE | Facility: CLINIC | Age: 66
End: 2021-03-03

## 2021-03-03 NOTE — TELEPHONE ENCOUNTER
Please inform him that losartan doesn't increase glucose levels. It could be stress if he is having any or not exercising as much. Also unsure of the stool softener he is referring too.

## 2021-03-03 NOTE — TELEPHONE ENCOUNTER
I spoke with the pt and he will increase his exercise and also speak with the pharmacist about the stool softener.

## 2021-03-03 NOTE — TELEPHONE ENCOUNTER
Looks like he's scheduled for the 12th, let me know if he needs a telephone visit to discuss  Otherwise please advise

## 2021-03-03 NOTE — TELEPHONE ENCOUNTER
Caller: Bob Biggs Sr.    Relationship: Self    Best call back number:[phone]        Which medication are you concerned about: losartan (Cozaar) 50 MG tablet    Who prescribed you this medication: PCP    What are your concerns: PATIENT STATES THAT SINCE STARTING THIS MEDICATION, HE HAS NOTICED AN INCREASE IN HIS BLOOD SUGAR LEVELS.  HE ALSO STARTED TAKING AN OVER THE COUNTER STOOL SOFTENER AT THIS TIME. HE WOULD LIKE TO KNOW IF EITHER OF THESE NEW MEDS WOULD CAUSE THIS CHANGE IN BLOOD SUGAR READINGS.     PLEASE CALL AND ADVISE

## 2021-03-04 ENCOUNTER — TREATMENT (OUTPATIENT)
Dept: PHYSICAL THERAPY | Facility: CLINIC | Age: 66
End: 2021-03-04

## 2021-03-04 ENCOUNTER — TELEPHONE (OUTPATIENT)
Dept: INTERNAL MEDICINE | Facility: CLINIC | Age: 66
End: 2021-03-04

## 2021-03-04 DIAGNOSIS — I10 ESSENTIAL HYPERTENSION: ICD-10-CM

## 2021-03-04 DIAGNOSIS — S39.012D STRAIN OF LUMBAR REGION, SUBSEQUENT ENCOUNTER: ICD-10-CM

## 2021-03-04 DIAGNOSIS — R10.84 GENERALIZED ABDOMINAL PAIN: ICD-10-CM

## 2021-03-04 DIAGNOSIS — E08.00 DIABETES MELLITUS DUE TO UNDERLYING CONDITION WITH HYPEROSMOLARITY WITHOUT COMA, WITHOUT LONG-TERM CURRENT USE OF INSULIN (HCC): ICD-10-CM

## 2021-03-04 DIAGNOSIS — M54.50 ACUTE BILATERAL LOW BACK PAIN WITHOUT SCIATICA: Primary | ICD-10-CM

## 2021-03-04 PROCEDURE — 97110 THERAPEUTIC EXERCISES: CPT | Performed by: PHYSICAL THERAPIST

## 2021-03-04 PROCEDURE — 97014 ELECTRIC STIMULATION THERAPY: CPT | Performed by: PHYSICAL THERAPIST

## 2021-03-04 PROCEDURE — 97530 THERAPEUTIC ACTIVITIES: CPT | Performed by: PHYSICAL THERAPIST

## 2021-03-04 RX ORDER — METFORMIN HYDROCHLORIDE 500 MG/1
TABLET, EXTENDED RELEASE ORAL
Qty: 90 TABLET | Refills: 0 | Status: SHIPPED | OUTPATIENT
Start: 2021-03-04 | End: 2021-06-07 | Stop reason: SDUPTHER

## 2021-03-04 RX ORDER — PANTOPRAZOLE SODIUM 40 MG/1
40 TABLET, DELAYED RELEASE ORAL 2 TIMES DAILY
Qty: 180 TABLET | Refills: 0 | Status: SHIPPED | OUTPATIENT
Start: 2021-03-04 | End: 2021-03-11

## 2021-03-04 RX ORDER — LOSARTAN POTASSIUM 50 MG/1
50 TABLET ORAL DAILY
Qty: 30 TABLET | Refills: 1 | Status: SHIPPED | OUTPATIENT
Start: 2021-03-04 | End: 2021-03-09 | Stop reason: SDUPTHER

## 2021-03-04 RX ORDER — VALACYCLOVIR HYDROCHLORIDE 500 MG/1
500 TABLET, FILM COATED ORAL DAILY
Qty: 90 TABLET | Refills: 0 | Status: SHIPPED | OUTPATIENT
Start: 2021-03-04 | End: 2021-07-12 | Stop reason: SDUPTHER

## 2021-03-04 NOTE — TELEPHONE ENCOUNTER
Caller: Bob Biggs Sr.    Relationship to patient: Self    Best call back number: [phone]    Patient is needing: PATIENT CALLED TO RESCHEDULE AN APPOINTMENT.  HE FOUND OUT THAT HIS PROVIDER WAS LEAVING THE PRACTICE.  HE STATED HE NEVER RECEIVED THE LETTER WITH THE NEW PROVIDERS THAT HE CAN TRANSFER HIS CARE.  PLEASE RESEND THIS LETTER TO THE PATIENT.

## 2021-03-04 NOTE — TELEPHONE ENCOUNTER
Caller: Bob Biggs Sr.    Relationship: Self    Best call back number: [phone]    Medication needed:   Requested Prescriptions     Pending Prescriptions Disp Refills   • valACYclovir (VALTREX) 500 MG tablet 90 tablet 0     Sig: Take 1 tablet by mouth Daily.   • pantoprazole (PROTONIX) 40 MG EC tablet 180 tablet 1     Sig: Take 1 tablet by mouth 2 (Two) Times a Day.   • losartan (Cozaar) 50 MG tablet 30 tablet 1     Sig: Take 1 tablet by mouth Daily.       When do you need the refill by: ASAP    What details did the patient provide when requesting the medication: PATIENT REQUESTS A 90 DAY SUPPLY OF THESE MEDICATIONS    Does the patient have less than a 3 day supply:  [] Yes  [x] No    What is the patient's preferred pharmacy: [facility] #[id number] - [place] [phone] [facility] [phone] FX<br>[facility] - [facility] [address] [facility] [phone] [facility] [phone] FX

## 2021-03-04 NOTE — PROGRESS NOTES
Physical Therapy  Progress Note        3/4/2021  Chandrika James, KONG    Re: Bob Biggs Sr.  ________________________________________________________________    Mr. Bob Biggs Sr., has attended 5 PT sessions.  Treatment has consisted of: conservative exercises due to other medical, modalities for pain     S: Mr. Bob Biggs Sr. states: a little stiff today.  Pain in low back 3-4/10. Hernia surgery 3/23.  Insurance said to hold PT until after that.        Subjective     Objective          Postural Observations  Seated posture: fair  Standing posture: fair    Additional Postural Observation Details  Decreased sp curves    Palpation   Left   Hypertonic in the erector spinae.     Right   Hypertonic in the erector spinae and quadratus lumborum. Tenderness of the erector spinae and quadratus lumborum.     Active Range of Motion     Lumbar   Flexion: WFL and with pain  Extension: Active lumbar extension: mild pain. WFL  Left lateral flexion: WFL  Right lateral flexion: WFL  Left rotation: WFL  Right rotation: WFL    Additional Active Range of Motion Details   mild muscular R side pain with ROT B    Strength/Myotome Testing     Additional Strength Details  No LE sxs; NT core strength due to hernia    Tests     Lumbar     Left   Negative passive SLR.     Right   Negative passive SLR.     Additional Tests Details  Just HS tightness and LBP with SLR B; + Magy R only with LBP    Functional Assessment     Comments  Walked 7 min on treadmill at 2.0 mph without increased pain.  No lift, push/pull testing due to hernia.      See Exercise, Manual, and Modality Logs for complete treatment.       Assessment/Plan    Mild subjective and objective improvement in LBP.  Note that treatment has been very conservative due to concurrent hernia.    Hold PT until cleared after hernia repair.              Manual Therapy:    0     mins  51616;  Therapeutic Exercise:    19     mins  03343;     Neuromuscular Cirilo:    0    mins   97187;    Therapeutic Activity:     10     mins  25038;         Timed Treatment:   29   mins   Total Treatment:     44   mins    Mayuri Mauricio PT  Physical Therapist

## 2021-03-09 DIAGNOSIS — I10 ESSENTIAL HYPERTENSION: ICD-10-CM

## 2021-03-09 RX ORDER — LOSARTAN POTASSIUM 50 MG/1
50 TABLET ORAL DAILY
Qty: 30 TABLET | Refills: 1 | Status: SHIPPED | OUTPATIENT
Start: 2021-03-09 | End: 2021-04-29 | Stop reason: SDUPTHER

## 2021-03-09 NOTE — TELEPHONE ENCOUNTER
Caller: Bob Biggs Sr.    Relationship: Self    Best call back number: [phone]     Medication needed:   Requested Prescriptions     Pending Prescriptions Disp Refills   • losartan (Cozaar) 50 MG tablet 30 tablet 1     Sig: Take 1 tablet by mouth Daily.       When do you need the refill by: 03/09/21    What details did the patient provide when requesting the medication: LAST REFILL WAS INCORRECT. PATIENT REQUESTED 90 DAY SUPPLY AND IT WAS SENT IN FOR 60.    Does the patient have less than a 3 day supply:  [x] Yes  [] No    What is the patient's preferred pharmacy: [facility] #[id number] - [place], [place] - [address] [place] [address] AT [facility] & [place] - [phone] [facility] [phone] FX

## 2021-03-10 ENCOUNTER — TRANSCRIBE ORDERS (OUTPATIENT)
Dept: PREADMISSION TESTING | Facility: HOSPITAL | Age: 66
End: 2021-03-10

## 2021-03-11 DIAGNOSIS — R10.84 GENERALIZED ABDOMINAL PAIN: ICD-10-CM

## 2021-03-11 RX ORDER — PANTOPRAZOLE SODIUM 40 MG/1
TABLET, DELAYED RELEASE ORAL
Qty: 180 TABLET | Refills: 0 | Status: SHIPPED | OUTPATIENT
Start: 2021-03-11 | End: 2021-10-13 | Stop reason: SDUPTHER

## 2021-03-16 ENCOUNTER — APPOINTMENT (OUTPATIENT)
Dept: PREADMISSION TESTING | Facility: HOSPITAL | Age: 66
End: 2021-03-16

## 2021-03-16 VITALS
DIASTOLIC BLOOD PRESSURE: 74 MMHG | BODY MASS INDEX: 29.12 KG/M2 | HEIGHT: 71 IN | TEMPERATURE: 98.9 F | OXYGEN SATURATION: 97 % | WEIGHT: 208 LBS | RESPIRATION RATE: 18 BRPM | SYSTOLIC BLOOD PRESSURE: 149 MMHG | HEART RATE: 97 BPM

## 2021-03-16 LAB
ANION GAP SERPL CALCULATED.3IONS-SCNC: 10.7 MMOL/L (ref 5–15)
BUN SERPL-MCNC: 14 MG/DL (ref 8–23)
BUN/CREAT SERPL: 13.7 (ref 7–25)
CALCIUM SPEC-SCNC: 8.6 MG/DL (ref 8.6–10.5)
CHLORIDE SERPL-SCNC: 105 MMOL/L (ref 98–107)
CO2 SERPL-SCNC: 23.3 MMOL/L (ref 22–29)
CREAT SERPL-MCNC: 1.02 MG/DL (ref 0.76–1.27)
DEPRECATED RDW RBC AUTO: 44.2 FL (ref 37–54)
ERYTHROCYTE [DISTWIDTH] IN BLOOD BY AUTOMATED COUNT: 13.1 % (ref 12.3–15.4)
GFR SERPL CREATININE-BSD FRML MDRD: 89 ML/MIN/1.73
GLUCOSE SERPL-MCNC: 242 MG/DL (ref 65–99)
HCT VFR BLD AUTO: 42.7 % (ref 37.5–51)
HGB BLD-MCNC: 14.2 G/DL (ref 13–17.7)
MCH RBC QN AUTO: 30.8 PG (ref 26.6–33)
MCHC RBC AUTO-ENTMCNC: 33.3 G/DL (ref 31.5–35.7)
MCV RBC AUTO: 92.6 FL (ref 79–97)
PLATELET # BLD AUTO: 193 10*3/MM3 (ref 140–450)
PMV BLD AUTO: 11 FL (ref 6–12)
POTASSIUM SERPL-SCNC: 4.3 MMOL/L (ref 3.5–5.2)
RBC # BLD AUTO: 4.61 10*6/MM3 (ref 4.14–5.8)
SODIUM SERPL-SCNC: 139 MMOL/L (ref 136–145)
WBC # BLD AUTO: 6.88 10*3/MM3 (ref 3.4–10.8)

## 2021-03-16 PROCEDURE — 36415 COLL VENOUS BLD VENIPUNCTURE: CPT

## 2021-03-16 PROCEDURE — 80048 BASIC METABOLIC PNL TOTAL CA: CPT

## 2021-03-16 PROCEDURE — 93005 ELECTROCARDIOGRAM TRACING: CPT

## 2021-03-16 PROCEDURE — 85027 COMPLETE CBC AUTOMATED: CPT

## 2021-03-16 PROCEDURE — 93010 ELECTROCARDIOGRAM REPORT: CPT | Performed by: INTERNAL MEDICINE

## 2021-03-16 NOTE — DISCHARGE INSTRUCTIONS
Take the following medications the morning of surgery:  PANTOPRAZOLE    If you are on prescription narcotic pain medication to control your pain you may also take that medication the morning of surgery.    General Instructions: CLEAR LIQUIDS UNTIL 5:00 AM MORNING OF SURGERY  • Do not eat solid food after midnight the night before surgery.  • You may drink clear liquids day of surgery but must stop at least one hour before your hospital arrival time.  • It is beneficial for you to have a clear drink that contains carbohydrates the day of surgery.  We suggest a 12 to 20 ounce bottle of Gatorade or Powerade for non-diabetic patients or a 12 to 20 ounce bottle of G2 or Powerade Zero for diabetic patients. (Pediatric patients, are not advised to drink a 12 to 20 ounce carbohydrate drink)    Clear liquids are liquids you can see through.  Nothing red in color.     Plain water                               Sports drinks  Sodas                                   Gelatin (Jell-O)  Fruit juices without pulp such as white grape juice and apple juice  Popsicles that contain no fruit or yogurt  Tea or coffee (no cream or milk added)  Gatorade / Powerade  G2 / Powerade Zero    • Infants may have breast milk up to four hours before surgery.  • Infants drinking formula may drink formula up to six hours before surgery.   • Patients who avoid smoking, chewing tobacco and alcohol for 4 weeks prior to surgery have a reduced risk of post-operative complications.  Quit smoking as many days before surgery as you can.  • Do not smoke, use chewing tobacco or drink alcohol the day of surgery.   • If applicable bring your C-PAP/ BI-PAP machine.  • Bring any papers given to you in the doctor’s office.  • Wear clean comfortable clothes.  • Do not wear contact lenses, false eyelashes or make-up.  Bring a case for your glasses.   • Bring crutches or walker if applicable.  • Remove all piercings.  Leave jewelry and any other valuables at  home.  • Hair extensions with metal clips must be removed prior to surgery.  • The Pre-Admission Testing nurse will instruct you to bring medications if unable to obtain an accurate list in Pre-Admission Testing.        If you were given a blood bank ID arm band remember to bring it with you the day of surgery.    Preventing a Surgical Site Infection:  • For 2 to 3 days before surgery, avoid shaving with a razor because the razor can irritate skin and make it easier to develop an infection.    • Any areas of open skin can increase the risk of a post-operative wound infection by allowing bacteria to enter and travel throughout the body.  Notify your surgeon if you have any skin wounds / rashes even if it is not near the expected surgical site.  The area will need assessed to determine if surgery should be delayed until it is healed.  • The night prior to surgery shower using a fresh bar of anti-bacterial soap (such as Dial) and clean washcloth.  Sleep in a clean bed with clean clothing.  Do not allow pets to sleep with you.  • Shower on the morning of surgery using a fresh bar of anti-bacterial soap (such as Dial) and clean washcloth.  Dry with a clean towel and dress in clean clothing.  • Ask your surgeon if you will be receiving antibiotics prior to surgery.  • Make sure you, your family, and all healthcare providers clean their hands with soap and water or an alcohol based hand  before caring for you or your wound.    Day of surgery: 3/23/2021 ARRIVAL TIME 6:00 AM  Your arrival time is approximately two hours before your scheduled surgery time.  Upon arrival, a Pre-op nurse and Anesthesiologist will review your health history, obtain vital signs, and answer questions you may have.  The only belongings needed at this time will be a list of your home medications and if applicable your C-PAP/BI-PAP machine.  A Pre-op nurse will start an IV and you may receive medication in preparation for surgery, including  something to help you relax.     Please be aware that surgery does come with discomfort.  We want to make every effort to control your discomfort so please discuss any uncontrolled symptoms with your nurse.   Your doctor will most likely have prescribed pain medications.      If you are going home after surgery you will receive individualized written care instructions before being discharged.  A responsible adult must drive you to and from the hospital on the day of your surgery and stay with you for 24 hours.  Discharge prescriptions can be filled by the hospital pharmacy during regular pharmacy hours.  If you are having surgery late in the day/evening your prescription may be e-prescribed to your pharmacy.  Please verify your pharmacy hours or chose a 24 hour pharmacy to avoid not having access to your prescription because your pharmacy has closed for the day.    If you are staying overnight following surgery, you will be transported to your hospital room following the recovery period.  [facility] has all private rooms.    If you have any questions please call Pre-Admission Testing at [phone].  Deductibles and co-payments are collected on the day of service. Please be prepared to pay the required co-pay, deductible or deposit on the day of service as defined by your plan.    Patient Education for Self-Quarantine Process    Following your COVID testing, we strongly recommend that you do not leave your home after you have been tested for COVID except to get medical care. This includes not going to work, school or to public areas.  If this is not possible for you to do please limit your activities to only required outings.  Be sure to wear a mask when you are with other people, practice social distancing and wash your hands frequently.      The following items provide additional details to keep you safe.  • Wash your hands with soap and water frequently for at least 20 seconds.   • Avoid touching  your eyes, nose and mouth with unwashed hands.  • Do not share anything - utensils, towels, food from the same bowl.   • Have your own utensils, drinking glass, dishes, towels and bedding.   • Do not have visitors.   • Do use FaceTime to stay in touch with family and friends.  • You should stay in a specific room away from others if possible.   • Stay at least 6 feet away from others in the home if you cannot have a dedicated room to yourself.   • Do not snuggle with your pet. While the CDC says there is no evidence that pets can spread COVID-19 or be infected from humans, it is probably best to avoid “petting, snuggling, being kissed or licked and sharing food (during self-quarantine)”, according to the CDC.   • Sanitize household surfaces daily. Include all high touch areas (door handles, light switches, phones, countertops, etc.)  • Do not share a bathroom with others, if possible.   • Wear a mask around others in your home if you are unable to stay in a separate room or 6 feet apart. If  you are unable to wear a mask, have your family member wear a mask if they must be within 6 feet of you.   Call your surgeon immediately if you experience any of the following symptoms:  • Sore Throat  • Shortness of Breath or difficulty breathing  • Cough  • Chills  • Body soreness or muscle pain  • Headache  • Fever  • New loss of taste or smell  • Do not arrive for your surgery ill.  Your procedure will need to be rescheduled to another time.  You will need to call your physician before the day of surgery to avoid any unnecessary exposure to hospital staff as well as other patients.    CHLORHEXIDINE CLOTH INSTRUCTIONS  The morning of surgery follow these instructions using the Chlorhexidine cloths you've been given.  These steps reduce bacteria on the body.  Do not use the cloths near your eyes, ears mouth, genitalia or on open wounds.  Throw the cloths away after use but do not try to flush them down a toilet.      • Open  and remove one cloth at a time from the package.    • Leave the cloth unfolded and begin the bathing.  • Massage the skin with the cloths using gentle pressure to remove bacteria.  Do not scrub harshly.   • Follow the steps below with one 2% CHG cloth per area (6 total cloths).  • One cloth for neck, shoulders and chest.  • One cloth for both arms, hands, fingers and underarms (do underarms last).  • One cloth for the abdomen followed by groin.  • One cloth for right leg and foot including between the toes.  • One cloth for left leg and foot including between the toes.  • The last cloth is to be used for the back of the neck, back and buttocks.    Allow the CHG to air dry 3 minutes on the skin which will give it time to work and decrease the chance of irritation.  The skin may feel sticky until it is dry.  Do not rinse with water or any other liquid or you will lose the beneficial effects of the CHG.  If mild skin irritation occurs, do rinse the skin to remove the CHG.  Report this to the nurse at time of admission.  Do not apply lotions, creams, ointments, deodorants or perfumes after using the clothes. Dress in clean clothes before coming to the hospital.

## 2021-03-17 ENCOUNTER — OFFICE VISIT (OUTPATIENT)
Dept: ORTHOPEDIC SURGERY | Facility: CLINIC | Age: 66
End: 2021-03-17

## 2021-03-17 VITALS — BODY MASS INDEX: 29.78 KG/M2 | WEIGHT: 208 LBS | HEIGHT: 70 IN | TEMPERATURE: 97.2 F

## 2021-03-17 DIAGNOSIS — M25.511 CHRONIC RIGHT SHOULDER PAIN: Primary | ICD-10-CM

## 2021-03-17 DIAGNOSIS — G89.29 CHRONIC RIGHT SHOULDER PAIN: Primary | ICD-10-CM

## 2021-03-17 LAB — QT INTERVAL: 351 MS

## 2021-03-17 PROCEDURE — 99214 OFFICE O/P EST MOD 30 MIN: CPT | Performed by: ORTHOPAEDIC SURGERY

## 2021-03-17 PROCEDURE — 20605 DRAIN/INJ JOINT/BURSA W/O US: CPT | Performed by: ORTHOPAEDIC SURGERY

## 2021-03-17 RX ORDER — METHYLPREDNISOLONE ACETATE 80 MG/ML
80 INJECTION, SUSPENSION INTRA-ARTICULAR; INTRALESIONAL; INTRAMUSCULAR; SOFT TISSUE
Status: COMPLETED | OUTPATIENT
Start: 2021-03-17 | End: 2021-03-17

## 2021-03-17 RX ADMIN — METHYLPREDNISOLONE ACETATE 80 MG: 80 INJECTION, SUSPENSION INTRA-ARTICULAR; INTRALESIONAL; INTRAMUSCULAR; SOFT TISSUE at 13:50

## 2021-03-17 NOTE — PROGRESS NOTES
"Patient:Bob Biggs .    YOB: 1955    Medical Record Number:[id number]    Chief Complaints: New complaint of right shoulder pain    History of Present Illness:     66 y.o. male patient who presents for his right shoulder.  The shoulder has been bothering him for a couple of years now.  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  This job involves a lot of manual activity including lifting, pushing and pulling.  His right shoulder has been bothering him since taking the new job.  Localizes his pain mostly to the top of the shoulder.  The pain is worse with certain reaching and lifting movements as well as heavy lifting, pushing and pulling.  He was having some shooting pain down his arm along with numbness and tingling.  This was several months ago and he had an MRI of his cervical spine which reportedly showed \"some pinched nerves\".  Those symptoms are now much better.    Allergies:No Known Allergies    Home Medications:    Current Outpatient Medications:   •  acetaminophen (TYLENOL) 650 MG 8 hr tablet, Take 650 mg by mouth Every 8 (Eight) Hours As Needed for Mild Pain ., Disp: , Rfl:   •  betamethasone dipropionate 0.05 % cream, Apply 1 application topically to the appropriate area as directed As Needed for Rash., Disp: 15 g, Rfl: 1  •  cetirizine (zyrTEC) 10 MG tablet, Take 10 mg by mouth Daily., Disp: , Rfl:   •  cyclobenzaprine (FLEXERIL) 10 MG tablet, Take 1 tablet by mouth 3 (Three) Times a Day As Needed for Muscle Spasms., Disp: 30 tablet, Rfl: 1  •  docusate sodium (Colace) 100 MG capsule, Take 1 capsule by mouth 2 (Two) Times a Day., Disp: 60 capsule, Rfl: 1  •  glucose blood test strip, CHECK BLOOD SUGAR 2 TIMES PER DAY, Disp: 200 each, Rfl: 3  •  guaiFENesin (MUCINEX) 600 MG 12 hr tablet, Take 1,200 mg by mouth As Needed for Cough., Disp: , Rfl:   •  ibuprofen (ADVIL,MOTRIN) 800 MG tablet, Take 800 mg by mouth Every 6 (Six) Hours As Needed. HOLD PRIOR TO SURG, Disp: , Rfl:   •  " "losartan (Cozaar) 50 MG tablet, Take 1 tablet by mouth Daily., Disp: 30 tablet, Rfl: 1  •  metFORMIN ER (GLUCOPHAGE-XR) 500 MG 24 hr tablet, TAKE 1 TABLET BY MOUTH DAILY WITH BREAKFAST, Disp: 90 tablet, Rfl: 0  •  Multiple Vitamin (ONE-A-DAY 55 PLUS PO), Take 1 tablet by mouth Daily. HOLD PRIOR TO SURG, Disp: , Rfl:   •  ONE TOUCH LANCETS misc, 1 each 2 (Two) Times a Day., Disp: 200 each, Rfl: 0  •  pantoprazole (PROTONIX) 40 MG EC tablet, TAKE 1 TABLET BY MOUTH TWICE DAILY, Disp: 180 tablet, Rfl: 0  •  valACYclovir (VALTREX) 500 MG tablet, Take 1 tablet by mouth Daily., Disp: 90 tablet, Rfl: 0    Past Medical History:   Diagnosis Date   • Arthritis    • Bell's palsy    • Diabetes mellitus (CMS/HCC)     Type II   • Finger fracture, left 09/22/2018   • GERD (gastroesophageal reflux disease)    • High blood pressure    • Inguinal hernia     RIGHT       Past Surgical History:   Procedure Laterality Date   • COLONOSCOPY  2008   • COLONOSCOPY N/A 6/10/2019    Procedure: COLONOSCOPY INTO CECUM AND TI;  Surgeon: Nato Khoury MD;  Location: [facility] ENDOSCOPY;  Service: Gastroenterology   • FINGER/THUMB CLOSED REDUCTION AND PERCUTANEOUS PINNING Left 9/25/2018    Procedure: CLOSED REDUCTION AND LT. SMALL PERCUTANEOUS FINGER PINNING;  Surgeon: Raghu Porras MD;  Location: [facility] OR;  Service: Hand   • HYDROCELECTOMY      late 1990s   • KNEE ARTHROPLASTY Right    • KNEE ARTHROSCOPY Left 2007, 2011   • TOTAL KNEE ARTHROPLASTY Left 11/8/2018    Procedure: TOTAL KNEE ARTHROPLASTY;  Surgeon: Nato Guerin MD;  Location: [facility] OR;  Service: Orthopedics       Social History     Occupational History   • Not on file   Tobacco Use   • Smoking status: Never Smoker   • Smokeless tobacco: Never Used   Vaping Use   • Vaping Use: Never used   Substance and Sexual Activity   • Alcohol use: No   • Drug use: Yes     Types: \"Crack\" cocaine     Comment: Recovered since 1999   • Sexual activity: Yes     Partners: Female    " "  Social History     Social History Narrative   • Not on file       Family History   Problem Relation Age of Onset   • Rheumatologic disease Mother    • Heart disease Father    • Cancer Sister    • Diabetes Brother    • Pancreatic cancer Brother    • No Known Problems Daughter    • No Known Problems Son    • No Known Problems Other    • No Known Problems Daughter    • Malig Hyperthermia Neg Hx        Review of Systems:      Constitutional: Denies fever, shaking or chills   Eyes: Denies change in visual acuity   HEENT: Denies nasal congestion or sore throat   Respiratory: Denies cough or shortness of breath   Cardiovascular: Denies chest pain or edema  Endocrine: Denies tremors, palpitations, intolerance of heat or cold, polyuria, polydipsia.  GI: Denies abdominal pain, nausea, vomiting, bloody stools or diarrhea  : Denies frequency, urgency, incontinence, retention, or nocturia.  Musculoskeletal: Denies numbness, tingling or loss of motor function except as above  Integument: Denies rash, lesion or ulceration   Neurologic: Denies headache or focal weakness, deficits  Heme: Denies spontaneous or excessive bleeding, epistaxis, hematuria, melena, fatigue, enlarged or tender lymph nodes.      All other pertinent positives and negatives as noted above in HPI.    Physical Exam:66 y.o. male  Vitals:    03/17/21 1333   Temp: 97.2 °F (36.2 °C)   Weight: 94.3 kg (208 lb)   Height: 177.8 cm (70\")       General:  Patient is awake and alert.  Appears in no acute distress or discomfort.    Psych:  Affect and demeanor are appropriate.    Extremities:  Right upper extremity: Skin is benign.  No gross abnormalities on inspection including any atrophy, swellings, or masses.  No palpable masses or adenopathy.  Focal tenderness noted over the acromioclavicular joint.  Full shoulder motion.  No evident instability or apprehension.  Positive active compression maneuver which reproduces the patient's typical pain.  Good strength in the " rotator cuff, deltoid, biceps, triceps, and .  Intact sensation throughout the arm.  Brisk cap refill.    Imaging: Outside x-rays including AP and orthogonal views of the right shoulder from February 15, 2021 are reviewed along with the radiology report.  He has advanced acromioclavicular arthritis.  No other significant findings are noted.    Assessment/Plan: Right acromioclavicular arthritis    We discussed the natural history of this condition and all available treatment options.  He elected for an injection.  The risk, benefits and alternatives were discussed.  He consented and the injection was performed as described below.  He will follow-up with me as needed.    Medium Joint Arthrocentesis: R acromioclavicular  Date/Time: 3/17/2021 1:50 PM  Consent given by: patient  Site marked: site marked  Timeout: Immediately prior to procedure a time out was called to verify the correct patient, procedure, equipment, support staff and site/side marked as required   Supporting Documentation  Indications: pain   Procedure Details  Location: shoulder - R acromioclavicular  Preparation: Patient was prepped and draped in the usual sterile fashion  Needle size: 25 G  Approach: superior  Medications administered: 80 mg methylPREDNISolone acetate 80 MG/ML; 2 mL lidocaine (cardiac)  Patient tolerance: patient tolerated the procedure well with no immediate complications            Nato Guerin MD    03/17/2021

## 2021-03-20 ENCOUNTER — LAB (OUTPATIENT)
Dept: LAB | Facility: HOSPITAL | Age: 66
End: 2021-03-20

## 2021-03-20 PROCEDURE — C9803 HOPD COVID-19 SPEC COLLECT: HCPCS

## 2021-03-20 PROCEDURE — U0004 COV-19 TEST NON-CDC HGH THRU: HCPCS

## 2021-03-22 ENCOUNTER — BULK ORDERING (OUTPATIENT)
Dept: CASE MANAGEMENT | Facility: OTHER | Age: 66
End: 2021-03-22

## 2021-03-22 DIAGNOSIS — Z23 IMMUNIZATION DUE: ICD-10-CM

## 2021-03-22 LAB — SARS-COV-2 RNA RESP QL NAA+PROBE: NOT DETECTED

## 2021-03-23 ENCOUNTER — ANESTHESIA (OUTPATIENT)
Dept: PERIOP | Facility: HOSPITAL | Age: 66
End: 2021-03-23

## 2021-03-23 ENCOUNTER — HOSPITAL ENCOUNTER (OUTPATIENT)
Facility: HOSPITAL | Age: 66
Setting detail: HOSPITAL OUTPATIENT SURGERY
Discharge: HOME | End: 2021-03-23
Attending: SURGERY | Admitting: SURGERY

## 2021-03-23 ENCOUNTER — ANESTHESIA EVENT (OUTPATIENT)
Dept: PERIOP | Facility: HOSPITAL | Age: 66
End: 2021-03-23

## 2021-03-23 VITALS
DIASTOLIC BLOOD PRESSURE: 80 MMHG | WEIGHT: 210.4 LBS | HEART RATE: 65 BPM | TEMPERATURE: 98.5 F | SYSTOLIC BLOOD PRESSURE: 135 MMHG | HEIGHT: 71 IN | OXYGEN SATURATION: 99 % | BODY MASS INDEX: 29.46 KG/M2 | RESPIRATION RATE: 16 BRPM

## 2021-03-23 DIAGNOSIS — K40.90 RIGHT INGUINAL HERNIA: ICD-10-CM

## 2021-03-23 LAB
GLUCOSE BLDC GLUCOMTR-MCNC: 149 MG/DL (ref 70–130)
GLUCOSE BLDC GLUCOMTR-MCNC: 188 MG/DL (ref 70–130)

## 2021-03-23 PROCEDURE — 63710000001 PROMETHAZINE PER 25 MG: Performed by: ANESTHESIOLOGY

## 2021-03-23 PROCEDURE — 25010000002 NEOSTIGMINE 5 MG/10ML SOLUTION: Performed by: ANESTHESIOLOGY

## 2021-03-23 PROCEDURE — 25010000002 ONDANSETRON PER 1 MG: Performed by: ANESTHESIOLOGY

## 2021-03-23 PROCEDURE — 25010000002 KETOROLAC TROMETHAMINE PER 15 MG: Performed by: ANESTHESIOLOGY

## 2021-03-23 PROCEDURE — C1781 MESH (IMPLANTABLE): HCPCS | Performed by: SURGERY

## 2021-03-23 PROCEDURE — 25010000002 MIDAZOLAM PER 1 MG: Performed by: ANESTHESIOLOGY

## 2021-03-23 PROCEDURE — C1889 IMPLANT/INSERT DEVICE, NOC: HCPCS | Performed by: SURGERY

## 2021-03-23 PROCEDURE — S2900 ROBOTIC SURGICAL SYSTEM: HCPCS | Performed by: SURGERY

## 2021-03-23 PROCEDURE — S0260 H&P FOR SURGERY: HCPCS | Performed by: SURGERY

## 2021-03-23 PROCEDURE — 25010000002 HYDROMORPHONE PER 4 MG: Performed by: ANESTHESIOLOGY

## 2021-03-23 PROCEDURE — 82962 GLUCOSE BLOOD TEST: CPT

## 2021-03-23 PROCEDURE — 49650 LAP ING HERNIA REPAIR INIT: CPT | Performed by: REGISTERED NURSE

## 2021-03-23 PROCEDURE — 25010000002 PROPOFOL 10 MG/ML EMULSION: Performed by: ANESTHESIOLOGY

## 2021-03-23 PROCEDURE — 49650 LAP ING HERNIA REPAIR INIT: CPT | Performed by: SURGERY

## 2021-03-23 PROCEDURE — 25010000003 CEFAZOLIN IN DEXTROSE 2-4 GM/100ML-% SOLUTION: Performed by: SURGERY

## 2021-03-23 DEVICE — 3DMAX™ MID ANATOMICAL MESH, LARGE, RIGHT, 4” X 6”, 10 X 16 CM
Type: IMPLANTABLE DEVICE | Site: ABDOMEN | Status: FUNCTIONAL
Brand: 3DMAX™ MID ANATOMICAL MESH

## 2021-03-23 DEVICE — DEV WND/CLS CONTRL TISS STRATAFIX SPIRAL MNCRYL SH 2/0 20CM: Type: IMPLANTABLE DEVICE | Site: ABDOMEN | Status: FUNCTIONAL

## 2021-03-23 RX ORDER — ROCURONIUM BROMIDE 10 MG/ML
INJECTION, SOLUTION INTRAVENOUS AS NEEDED
Status: DISCONTINUED | OUTPATIENT
Start: 2021-03-23 | End: 2021-03-23 | Stop reason: SURG

## 2021-03-23 RX ORDER — SODIUM CHLORIDE 0.9 % (FLUSH) 0.9 %
3 SYRINGE (ML) INJECTION EVERY 12 HOURS SCHEDULED
Status: DISCONTINUED | OUTPATIENT
Start: 2021-03-23 | End: 2021-03-23 | Stop reason: HOSPADM

## 2021-03-23 RX ORDER — NEOSTIGMINE METHYLSULFATE 0.5 MG/ML
INJECTION, SOLUTION INTRAVENOUS AS NEEDED
Status: DISCONTINUED | OUTPATIENT
Start: 2021-03-23 | End: 2021-03-23 | Stop reason: SURG

## 2021-03-23 RX ORDER — HYDROMORPHONE HCL 110MG/55ML
PATIENT CONTROLLED ANALGESIA SYRINGE INTRAVENOUS AS NEEDED
Status: DISCONTINUED | OUTPATIENT
Start: 2021-03-23 | End: 2021-03-23 | Stop reason: SURG

## 2021-03-23 RX ORDER — SODIUM CHLORIDE 0.9 % (FLUSH) 0.9 %
3-10 SYRINGE (ML) INJECTION AS NEEDED
Status: DISCONTINUED | OUTPATIENT
Start: 2021-03-23 | End: 2021-03-23 | Stop reason: HOSPADM

## 2021-03-23 RX ORDER — LIDOCAINE HYDROCHLORIDE 10 MG/ML
0.5 INJECTION, SOLUTION EPIDURAL; INFILTRATION; INTRACAUDAL; PERINEURAL ONCE AS NEEDED
Status: DISCONTINUED | OUTPATIENT
Start: 2021-03-23 | End: 2021-03-23 | Stop reason: HOSPADM

## 2021-03-23 RX ORDER — ONDANSETRON 2 MG/ML
4 INJECTION INTRAMUSCULAR; INTRAVENOUS ONCE AS NEEDED
Status: COMPLETED | OUTPATIENT
Start: 2021-03-23 | End: 2021-03-23

## 2021-03-23 RX ORDER — MIDAZOLAM HYDROCHLORIDE 1 MG/ML
1 INJECTION INTRAMUSCULAR; INTRAVENOUS
Status: DISCONTINUED | OUTPATIENT
Start: 2021-03-23 | End: 2021-03-23 | Stop reason: HOSPADM

## 2021-03-23 RX ORDER — PROMETHAZINE HYDROCHLORIDE 25 MG/1
25 SUPPOSITORY RECTAL ONCE AS NEEDED
Status: COMPLETED | OUTPATIENT
Start: 2021-03-23 | End: 2021-03-23

## 2021-03-23 RX ORDER — HYDROMORPHONE HYDROCHLORIDE 1 MG/ML
0.5 INJECTION, SOLUTION INTRAMUSCULAR; INTRAVENOUS; SUBCUTANEOUS
Status: DISCONTINUED | OUTPATIENT
Start: 2021-03-23 | End: 2021-03-23 | Stop reason: HOSPADM

## 2021-03-23 RX ORDER — FLUMAZENIL 0.1 MG/ML
0.2 INJECTION INTRAVENOUS AS NEEDED
Status: DISCONTINUED | OUTPATIENT
Start: 2021-03-23 | End: 2021-03-23 | Stop reason: HOSPADM

## 2021-03-23 RX ORDER — PROPOFOL 10 MG/ML
VIAL (ML) INTRAVENOUS AS NEEDED
Status: DISCONTINUED | OUTPATIENT
Start: 2021-03-23 | End: 2021-03-23 | Stop reason: SURG

## 2021-03-23 RX ORDER — SODIUM CHLORIDE 0.9 % (FLUSH) 0.9 %
10 SYRINGE (ML) INJECTION AS NEEDED
Status: DISCONTINUED | OUTPATIENT
Start: 2021-03-23 | End: 2021-03-23 | Stop reason: HOSPADM

## 2021-03-23 RX ORDER — KETOROLAC TROMETHAMINE 30 MG/ML
INJECTION, SOLUTION INTRAMUSCULAR; INTRAVENOUS AS NEEDED
Status: DISCONTINUED | OUTPATIENT
Start: 2021-03-23 | End: 2021-03-23 | Stop reason: SURG

## 2021-03-23 RX ORDER — DIPHENHYDRAMINE HYDROCHLORIDE 50 MG/ML
12.5 INJECTION INTRAMUSCULAR; INTRAVENOUS
Status: DISCONTINUED | OUTPATIENT
Start: 2021-03-23 | End: 2021-03-23 | Stop reason: HOSPADM

## 2021-03-23 RX ORDER — FENTANYL CITRATE 50 UG/ML
50 INJECTION, SOLUTION INTRAMUSCULAR; INTRAVENOUS
Status: DISCONTINUED | OUTPATIENT
Start: 2021-03-23 | End: 2021-03-23 | Stop reason: HOSPADM

## 2021-03-23 RX ORDER — EPHEDRINE SULFATE 50 MG/ML
5 INJECTION, SOLUTION INTRAVENOUS ONCE AS NEEDED
Status: DISCONTINUED | OUTPATIENT
Start: 2021-03-23 | End: 2021-03-23 | Stop reason: HOSPADM

## 2021-03-23 RX ORDER — DIPHENHYDRAMINE HCL 25 MG
25 CAPSULE ORAL
Status: DISCONTINUED | OUTPATIENT
Start: 2021-03-23 | End: 2021-03-23 | Stop reason: HOSPADM

## 2021-03-23 RX ORDER — FAMOTIDINE 10 MG/ML
20 INJECTION, SOLUTION INTRAVENOUS ONCE
Status: COMPLETED | OUTPATIENT
Start: 2021-03-23 | End: 2021-03-23

## 2021-03-23 RX ORDER — HYDRALAZINE HYDROCHLORIDE 20 MG/ML
5 INJECTION INTRAMUSCULAR; INTRAVENOUS
Status: DISCONTINUED | OUTPATIENT
Start: 2021-03-23 | End: 2021-03-23 | Stop reason: HOSPADM

## 2021-03-23 RX ORDER — MIDAZOLAM HYDROCHLORIDE 1 MG/ML
0.5 INJECTION INTRAMUSCULAR; INTRAVENOUS
Status: DISCONTINUED | OUTPATIENT
Start: 2021-03-23 | End: 2021-03-23 | Stop reason: HOSPADM

## 2021-03-23 RX ORDER — OXYCODONE AND ACETAMINOPHEN 7.5; 325 MG/1; MG/1
1 TABLET ORAL ONCE AS NEEDED
Status: DISCONTINUED | OUTPATIENT
Start: 2021-03-23 | End: 2021-03-23 | Stop reason: HOSPADM

## 2021-03-23 RX ORDER — HYDROCODONE BITARTRATE AND ACETAMINOPHEN 7.5; 325 MG/1; MG/1
1 TABLET ORAL EVERY 6 HOURS PRN
Qty: 12 TABLET | Refills: 0 | Status: SHIPPED | OUTPATIENT
Start: 2021-03-23 | End: 2021-04-05

## 2021-03-23 RX ORDER — MAGNESIUM HYDROXIDE 1200 MG/15ML
LIQUID ORAL AS NEEDED
Status: DISCONTINUED | OUTPATIENT
Start: 2021-03-23 | End: 2021-03-23 | Stop reason: HOSPADM

## 2021-03-23 RX ORDER — ONDANSETRON 2 MG/ML
INJECTION INTRAMUSCULAR; INTRAVENOUS AS NEEDED
Status: DISCONTINUED | OUTPATIENT
Start: 2021-03-23 | End: 2021-03-23 | Stop reason: SURG

## 2021-03-23 RX ORDER — SODIUM CHLORIDE, SODIUM LACTATE, POTASSIUM CHLORIDE, CALCIUM CHLORIDE 600; 310; 30; 20 MG/100ML; MG/100ML; MG/100ML; MG/100ML
9 INJECTION, SOLUTION INTRAVENOUS CONTINUOUS
Status: DISCONTINUED | OUTPATIENT
Start: 2021-03-23 | End: 2021-03-23 | Stop reason: HOSPADM

## 2021-03-23 RX ORDER — LIDOCAINE HYDROCHLORIDE 20 MG/ML
INJECTION, SOLUTION INFILTRATION; PERINEURAL AS NEEDED
Status: DISCONTINUED | OUTPATIENT
Start: 2021-03-23 | End: 2021-03-23 | Stop reason: SURG

## 2021-03-23 RX ORDER — BUPIVACAINE HYDROCHLORIDE AND EPINEPHRINE 5; 5 MG/ML; UG/ML
INJECTION, SOLUTION PERINEURAL AS NEEDED
Status: DISCONTINUED | OUTPATIENT
Start: 2021-03-23 | End: 2021-03-23 | Stop reason: HOSPADM

## 2021-03-23 RX ORDER — GLYCOPYRROLATE 0.2 MG/ML
INJECTION INTRAMUSCULAR; INTRAVENOUS AS NEEDED
Status: DISCONTINUED | OUTPATIENT
Start: 2021-03-23 | End: 2021-03-23 | Stop reason: SURG

## 2021-03-23 RX ORDER — CEFAZOLIN SODIUM 2 G/100ML
2 INJECTION, SOLUTION INTRAVENOUS ONCE
Status: COMPLETED | OUTPATIENT
Start: 2021-03-23 | End: 2021-03-23

## 2021-03-23 RX ORDER — ONDANSETRON 4 MG/1
4 TABLET, FILM COATED ORAL DAILY PRN
Qty: 20 TABLET | Refills: 1 | Status: SHIPPED | OUTPATIENT
Start: 2021-03-23 | End: 2021-04-12

## 2021-03-23 RX ORDER — NALOXONE HCL 0.4 MG/ML
0.2 VIAL (ML) INJECTION AS NEEDED
Status: DISCONTINUED | OUTPATIENT
Start: 2021-03-23 | End: 2021-03-23 | Stop reason: HOSPADM

## 2021-03-23 RX ORDER — LABETALOL HYDROCHLORIDE 5 MG/ML
5 INJECTION, SOLUTION INTRAVENOUS
Status: DISCONTINUED | OUTPATIENT
Start: 2021-03-23 | End: 2021-03-23 | Stop reason: HOSPADM

## 2021-03-23 RX ORDER — PROMETHAZINE HYDROCHLORIDE 25 MG/1
25 TABLET ORAL ONCE AS NEEDED
Status: COMPLETED | OUTPATIENT
Start: 2021-03-23 | End: 2021-03-23

## 2021-03-23 RX ORDER — HYDROCODONE BITARTRATE AND ACETAMINOPHEN 7.5; 325 MG/1; MG/1
1 TABLET ORAL ONCE AS NEEDED
Status: COMPLETED | OUTPATIENT
Start: 2021-03-23 | End: 2021-03-23

## 2021-03-23 RX ADMIN — ROCURONIUM BROMIDE 50 MG: 50 INJECTION INTRAVENOUS at 08:14

## 2021-03-23 RX ADMIN — HYDROCODONE BITARTRATE AND ACETAMINOPHEN 1 TABLET: 7.5; 325 TABLET ORAL at 10:20

## 2021-03-23 RX ADMIN — LIDOCAINE HYDROCHLORIDE 100 MG: 20 INJECTION, SOLUTION INFILTRATION; PERINEURAL at 08:14

## 2021-03-23 RX ADMIN — GLYCOPYRROLATE 0.4 MG: 0.2 INJECTION INTRAMUSCULAR; INTRAVENOUS at 09:26

## 2021-03-23 RX ADMIN — CEFAZOLIN SODIUM 2 G: 2 INJECTION, SOLUTION INTRAVENOUS at 08:17

## 2021-03-23 RX ADMIN — HYDROMORPHONE HYDROCHLORIDE 0.5 MG: 2 INJECTION, SOLUTION INTRAMUSCULAR; INTRAVENOUS; SUBCUTANEOUS at 08:24

## 2021-03-23 RX ADMIN — MIDAZOLAM 1 MG: 1 INJECTION INTRAMUSCULAR; INTRAVENOUS at 07:28

## 2021-03-23 RX ADMIN — FAMOTIDINE 20 MG: 10 INJECTION INTRAVENOUS at 07:28

## 2021-03-23 RX ADMIN — KETOROLAC TROMETHAMINE 30 MG: 30 INJECTION, SOLUTION INTRAMUSCULAR at 08:14

## 2021-03-23 RX ADMIN — PROMETHAZINE HYDROCHLORIDE 25 MG: 25 TABLET ORAL at 10:28

## 2021-03-23 RX ADMIN — SODIUM CHLORIDE, POTASSIUM CHLORIDE, SODIUM LACTATE AND CALCIUM CHLORIDE 9 ML/HR: 600; 310; 30; 20 INJECTION, SOLUTION INTRAVENOUS at 07:15

## 2021-03-23 RX ADMIN — HYDROMORPHONE HYDROCHLORIDE 0.5 MG: 2 INJECTION, SOLUTION INTRAMUSCULAR; INTRAVENOUS; SUBCUTANEOUS at 08:14

## 2021-03-23 RX ADMIN — ONDANSETRON 4 MG: 2 INJECTION INTRAMUSCULAR; INTRAVENOUS at 10:05

## 2021-03-23 RX ADMIN — PROPOFOL 200 MG: 10 INJECTION, EMULSION INTRAVENOUS at 08:14

## 2021-03-23 RX ADMIN — ONDANSETRON 4 MG: 2 INJECTION INTRAMUSCULAR; INTRAVENOUS at 08:14

## 2021-03-23 RX ADMIN — NEOSTIGMINE METHYLSULFATE 2.5 MG: 0.5 INJECTION INTRAVENOUS at 09:26

## 2021-03-23 NOTE — ANESTHESIA PREPROCEDURE EVALUATION
Anesthesia Evaluation     Patient summary reviewed and Nursing notes reviewed   NPO Solid Status: > 8 hours  NPO Liquid Status: > 2 hours           Airway   Mallampati: II  TM distance: >3 FB  Neck ROM: full  No difficulty expected  Dental - normal exam     Pulmonary - negative pulmonary ROS and normal exam   Cardiovascular - normal exam  Exercise tolerance: good (4-7 METS)    ECG reviewed    (+) hypertension well controlled less than 2 medications,       Neuro/Psych- negative ROS  GI/Hepatic/Renal/Endo    (+)  GERD well controlled,  diabetes mellitus type 2 well controlled,     Musculoskeletal     Abdominal  - normal exam    Bowel sounds: normal.   Substance History - negative use     OB/GYN negative ob/gyn ROS         Other   arthritis,                      Anesthesia Plan    ASA 2     general     intravenous induction     Anesthetic plan, all risks, benefits, and alternatives have been provided, discussed and informed consent has been obtained with: patient.    Plan discussed with attending and CRNA.

## 2021-03-23 NOTE — ANESTHESIA POSTPROCEDURE EVALUATION
Patient: Bob Bigsg Sr.    Procedure Summary     Date: 03/23/21 Room / Location: [facility] OR  / [facility] MAIN OR    Anesthesia Start: 0807 Anesthesia Stop: 0942    Procedure: laparoscopic right INGUINAL HERNIA REPAIR with mesh WITH DAVINCI ROBOT (Right Abdomen) Diagnosis:       Right inguinal hernia      (Right inguinal hernia [K40.90])    Surgeons: Jimy Hagen MD Provider: Froilan Shane MD    Anesthesia Type: general ASA Status: 2          Anesthesia Type: general    Vitals  Vitals Value Taken Time   /90 03/23/21 1045   Temp 36.9 °C (98.5 °F) 03/23/21 0940   Pulse 66 03/23/21 1045   Resp 16 03/23/21 1045   SpO2 97 % 03/23/21 1054   Vitals shown include unvalidated device data.        Post Anesthesia Care and Evaluation    Patient location during evaluation: PACU  Patient participation: complete - patient participated  Level of consciousness: awake and alert  Pain management: adequate  Airway patency: patent  Anesthetic complications: No anesthetic complications    Cardiovascular status: acceptable  Respiratory status: acceptable  Hydration status: acceptable    Comments: --------------------            03/23/21               1220     --------------------   BP:                  Pulse:               Resp:                Temp:                SpO2:      99%      --------------------

## 2021-03-23 NOTE — ANESTHESIA PROCEDURE NOTES
Airway  Urgency: elective    Date/Time: 3/23/2021 8:20 AM  Airway not difficult    General Information and Staff    Patient location during procedure: OR  Anesthesiologist: Froilan Shane MD    Indications and Patient Condition  Indications for airway management: airway protection    Preoxygenated: yes  Mask difficulty assessment: 1 - vent by mask    Final Airway Details  Final airway type: endotracheal airway      Successful airway: ETT  Cuffed: yes   Successful intubation technique: direct laryngoscopy  Facilitating devices/methods: anterior pressure/BURP  Blade: Gunnar  Blade size: 3  ETT size (mm): 8.0  Cormack-Lehane Classification: grade IIb - view of arytenoids or posterior of glottis only  Placement verified by: chest auscultation and capnometry   Measured from: lips  ETT/EBT  to lips (cm): 22  Number of attempts at approach: 1  Assessment: lips, teeth, and gum same as pre-op and atraumatic intubation

## 2021-04-05 ENCOUNTER — OFFICE VISIT (OUTPATIENT)
Dept: SURGERY | Facility: CLINIC | Age: 66
End: 2021-04-05

## 2021-04-05 VITALS — BODY MASS INDEX: 29.4 KG/M2 | WEIGHT: 210 LBS | HEIGHT: 71 IN

## 2021-04-05 DIAGNOSIS — K40.90 RIGHT INGUINAL HERNIA: Primary | ICD-10-CM

## 2021-04-05 PROCEDURE — 99024 POSTOP FOLLOW-UP VISIT: CPT | Performed by: SURGERY

## 2021-04-05 NOTE — PROGRESS NOTES
Postop inguinal hernia repair    Subjective:  Patient is now about 2 weeks out from da Omar robot-assisted right inguinal hernia repair.  He reports a fair amount of discomfort at the site as well as some discomfort down into the right testicle.  Bowels are functioning.  Pain is gradually improving.    Objective:  Trocar incisions are in good order, there is some bruising around the supraumbilical site.  No evidence of hernia recurrence in the groin.  No significant swelling.    Assessment and plan:  -Postop right inguinal hernia repair with mesh placement, gradually improving  -Increase activity next week  -Return to work 6 weeks postop    Jimy Hagen MD  General and Endoscopic Surgery  [facility]    [address], [address]  [place], [place], [address]  P: [phone]  F: [phone]

## 2021-04-29 ENCOUNTER — OFFICE VISIT (OUTPATIENT)
Dept: INTERNAL MEDICINE | Facility: CLINIC | Age: 66
End: 2021-04-29

## 2021-04-29 VITALS
HEART RATE: 97 BPM | HEIGHT: 71 IN | SYSTOLIC BLOOD PRESSURE: 125 MMHG | TEMPERATURE: 98.6 F | DIASTOLIC BLOOD PRESSURE: 81 MMHG | OXYGEN SATURATION: 95 % | WEIGHT: 211 LBS | BODY MASS INDEX: 29.54 KG/M2 | RESPIRATION RATE: 20 BRPM

## 2021-04-29 DIAGNOSIS — K21.9 GASTROESOPHAGEAL REFLUX DISEASE WITHOUT ESOPHAGITIS: ICD-10-CM

## 2021-04-29 DIAGNOSIS — E08.00 DIABETES MELLITUS DUE TO UNDERLYING CONDITION WITH HYPEROSMOLARITY WITHOUT COMA, WITHOUT LONG-TERM CURRENT USE OF INSULIN (HCC): Primary | ICD-10-CM

## 2021-04-29 DIAGNOSIS — I10 ESSENTIAL HYPERTENSION: Chronic | ICD-10-CM

## 2021-04-29 PROCEDURE — 99213 OFFICE O/P EST LOW 20 MIN: CPT | Performed by: NURSE PRACTITIONER

## 2021-04-29 RX ORDER — LOSARTAN POTASSIUM 50 MG/1
50 TABLET ORAL DAILY
Qty: 90 TABLET | Refills: 1 | Status: SHIPPED | OUTPATIENT
Start: 2021-04-29 | End: 2021-10-13 | Stop reason: SDUPTHER

## 2021-04-29 NOTE — ASSESSMENT & PLAN NOTE
Hypertension is well controlled, continue losartan 50 mg daily, routine home monitoring recommended for goal of less than 130/80 along with lowering sodium in the diet.

## 2021-04-29 NOTE — PROGRESS NOTES
"Chief Complaint  Med Refill (Pt presents here today for a medication refill.)    Subjective          Bob Biggs . presents to [facility]  Patient presents for medication refills.  This is a 66-year-old female former patient of KONG Koo, upcoming patient of Dr. Thomas.  He has an establish care appointment with Dr. Thomas in October 2021.    He has type 2 diabetes and takes Metformin  mg daily with good compliance.  Last A1c drawn on 2/15/2021 was well controlled at 6.8.    He has hypertension and takes losartan 50 mg daily.  Reports that this has improved his blood pressure readings, now consistently seeing readings in the 120s over 70s or 80s.  No side effects with losartan reports good compliance with the medication.  He needs a refill today.    He has GERD and takes pantoprazole 40 mg daily as needed.  Reports that this along with avoidance of trigger foods controls the condition well.    Overall reports that he is feeling well, recently recovered from a right inguinal hernia repair with Dr. Hagen and is scheduled to go back to work soon.    Denies development of any other new issues today.      Objective   Vital Signs:   /81 (BP Location: Right arm, Patient Position: Sitting, Cuff Size: Adult)   Pulse 97   Temp 98.6 °F (37 °C)   Resp 20   Ht 179.1 cm (70.5\")   Wt 95.7 kg (211 lb)   SpO2 95%   BMI 29.85 kg/m²     Physical Exam  Vitals and nursing note reviewed.   Constitutional:       General: He is not in acute distress.     Appearance: Normal appearance. He is well-developed. He is not ill-appearing, toxic-appearing or diaphoretic.   HENT:      Head: Normocephalic and atraumatic.      Right Ear: External ear normal.      Left Ear: External ear normal.   Eyes:      Pupils: Pupils are equal, round, and reactive to light.   Neck:      Vascular: No carotid bruit.   Cardiovascular:      Rate and Rhythm: Normal rate and regular rhythm.      " Pulses: Normal pulses.      Heart sounds: Normal heart sounds.      Comments: No peripheral edema  Pulmonary:      Effort: Pulmonary effort is normal. No respiratory distress.      Breath sounds: Normal breath sounds. No stridor. No wheezing, rhonchi or rales.   Chest:      Chest wall: No tenderness.   Abdominal:      General: Bowel sounds are normal. There is no distension.      Palpations: Abdomen is soft.      Tenderness: There is no abdominal tenderness.   Musculoskeletal:         General: Normal range of motion.      Cervical back: Normal range of motion and neck supple. No rigidity or tenderness.   Lymphadenopathy:      Cervical: No cervical adenopathy.   Skin:     General: Skin is warm and dry.      Capillary Refill: Capillary refill takes less than 2 seconds.   Neurological:      General: No focal deficit present.      Mental Status: He is alert and oriented to person, place, and time. Mental status is at baseline.   Psychiatric:         Mood and Affect: Mood normal.         Behavior: Behavior normal.         Thought Content: Thought content normal.         Judgment: Judgment normal.        Result Review :   The following data was reviewed by: KONG Magallon on 04/29/2021:  Common labs    Common Labsle 7/6/20 7/6/20 7/6/20 7/6/20 7/6/20 7/6/20 2/15/21 2/15/21 2/15/21 2/15/21 3/16/21 3/16/21    1133 1133 1133 1133 1133 1133 1058 1058 1058 1058 1148 1148   Glucose            242 (A)   Glucose  169 (A)      172 (A)       BUN  13      17    14   Creatinine  0.95      0.99    1.02   eGFR Non African Am  80      76       eGFR  Am  96      92    89   Sodium  139      140    139   Potassium  4.5      4.4    4.3   Chloride  105      103    105   Calcium  8.8      9.1    8.6   Total Protein  7.3      7.2       Albumin  4.50      4.40       Total Bilirubin  0.4      0.5       Alkaline Phosphatase  49      55       AST (SGOT)  17      25       ALT (SGPT)  26      28       WBC     6.11     6.89 6.88     Hemoglobin     13.9     14.2 14.2    Hematocrit     41.2     44.4 42.7    Platelets     206     195 193    Total Cholesterol    194     189      Triglycerides    54     49      HDL Cholesterol    44     43      LDL Cholesterol     139 (A)     137 (A)      Hemoglobin A1C 6.90 (A)      6.80 (A)        Microalbumin, Urine   16.3            PSA      1.130         (A) Abnormal value       Comments are available for some flowsheets but are not being displayed.           Current outpatient and discharge medications have been reconciled for the patient.  Reviewed by: KONG Magallon     Office Visit with Leona Owens APRN (02/15/2021)           Assessment and Plan    Diagnoses and all orders for this visit:    1. Diabetes mellitus due to underlying condition with hyperosmolarity without coma, without long-term current use of insulin (CMS/[facility]) (Primary)  Assessment & Plan:  Diabetes is stable, continue Metformin  mg daily.      2. Gastroesophageal reflux disease without esophagitis  Assessment & Plan:  Stable, continue pantoprazole 40 mg daily as needed and avoidance of trigger foods.      3. Essential hypertension  Assessment & Plan:  Hypertension is well controlled, continue losartan 50 mg daily, routine home monitoring recommended for goal of less than 130/80 along with lowering sodium in the diet.    Orders:  -     losartan (Cozaar) 50 MG tablet; Take 1 tablet by mouth Daily.  Dispense: 90 tablet; Refill: 1    Follow-up as needed and he will be back for his establish care appointment with Dr. Thomas in October 2021.    Follow Up   Return if symptoms worsen or fail to improve, for Next scheduled follow up.  Patient was given instructions and counseling regarding his condition or for health maintenance advice. Please see specific information pulled into the AVS if appropriate.

## 2021-05-17 ENCOUNTER — TELEPHONE (OUTPATIENT)
Dept: SURGERY | Facility: CLINIC | Age: 66
End: 2021-05-17

## 2021-05-17 NOTE — TELEPHONE ENCOUNTER
Based on the description, I do not think there is anything to be terribly concerned about.  I would recommend continued use of ibuprofen to try to minimize the symptoms.  If the pain persists, he may come back to the office for further evaluation, but I doubt any further intervention would be advised.

## 2021-05-17 NOTE — TELEPHONE ENCOUNTER
Patient called stating that he still has mild discomfort at the site more so with BM and when bending over to tie his shoes. Since this is a Workers Comp injury, he just wants to make sure that there is nothing to be concerned about. He states he is not having issues with BM's. S/P Laparoscopic right inguinal hernia repair with da Omar robot assistance on 03/23/2021  Please advise.

## 2021-06-07 DIAGNOSIS — E08.00 DIABETES MELLITUS DUE TO UNDERLYING CONDITION WITH HYPEROSMOLARITY WITHOUT COMA, WITHOUT LONG-TERM CURRENT USE OF INSULIN (HCC): ICD-10-CM

## 2021-06-07 RX ORDER — METFORMIN HYDROCHLORIDE 500 MG/1
500 TABLET, EXTENDED RELEASE ORAL
Qty: 90 TABLET | Refills: 1 | Status: SHIPPED | OUTPATIENT
Start: 2021-06-07 | End: 2021-10-29 | Stop reason: SDUPTHER

## 2021-06-07 NOTE — TELEPHONE ENCOUNTER
Caller: Bob Biggs Sr.    Relationship: Self    Best call back number: [phone]     Medication needed:   Requested Prescriptions     Pending Prescriptions Disp Refills   • metFORMIN ER (GLUCOPHAGE-XR) 500 MG 24 hr tablet 90 tablet 0     Sig: Take 1 tablet by mouth Daily With Breakfast.       When do you need the refill by: ASAP    What additional details did the patient provide when requesting the medication:   PATIENT IS REQUESTING A 90 DAY SUPPLY.  HE IS ALSO REQUESTING REFILLS ON ALL HIS MEDICATIONS. HE STATES THAT HE WAS SEEN ON 4/29/21 FOR THIS PURPOSE    Does the patient have less than a 3 day supply:  [x] Yes  [] No    What is the patient's preferred pharmacy: [facility] #[id number] - [address] - [phone] [facility] [phone]

## 2021-07-12 RX ORDER — VALACYCLOVIR HYDROCHLORIDE 500 MG/1
500 TABLET, FILM COATED ORAL DAILY
Qty: 90 TABLET | Refills: 1 | Status: SHIPPED | OUTPATIENT
Start: 2021-07-12 | End: 2021-10-29

## 2021-07-12 NOTE — TELEPHONE ENCOUNTER
PATIENT NEEDS REFILL ON:valACYclovir (VALTREX) 500 MG tablet     PATIENT CAN BE REACHED ON: [phone]     PHARMACY [facility] - [facility] [address] [address] [facility] - [phone]  - [phone]   [phone]

## 2021-10-08 ENCOUNTER — TELEPHONE (OUTPATIENT)
Dept: INTERNAL MEDICINE | Facility: CLINIC | Age: 66
End: 2021-10-08

## 2021-10-08 DIAGNOSIS — I10 ESSENTIAL HYPERTENSION: Chronic | ICD-10-CM

## 2021-10-08 DIAGNOSIS — R10.84 GENERALIZED ABDOMINAL PAIN: ICD-10-CM

## 2021-10-08 NOTE — TELEPHONE ENCOUNTER
Caller: Bob Biggs Sr.    Relationship: Self      Medication requested (name and dosage): pantoprazole (PROTONIX) 40 MG EC tablet  losartan (Cozaar) 50 MG tablet    Pharmacy where request should be sent: [facility] #[id number] [place] [id number] [facility] AT [address] [place] [address] - [phone]  - [phone]       Additional details provided by patient: HAS 1 WEEK OF MEDS LEFT     Best call back number: [phone]    Does the patient have less than a 3 day supply:  [] Yes  [x] No    Talha Harris Rep   10/08/21 14:28 EDT

## 2021-10-13 RX ORDER — PANTOPRAZOLE SODIUM 40 MG/1
40 TABLET, DELAYED RELEASE ORAL 2 TIMES DAILY
Qty: 180 TABLET | Refills: 0 | Status: SHIPPED | OUTPATIENT
Start: 2021-10-13 | End: 2021-10-29 | Stop reason: SDUPTHER

## 2021-10-13 RX ORDER — LOSARTAN POTASSIUM 50 MG/1
50 TABLET ORAL DAILY
Qty: 90 TABLET | Refills: 0 | Status: SHIPPED | OUTPATIENT
Start: 2021-10-13 | End: 2021-10-29 | Stop reason: SDUPTHER

## 2021-10-13 NOTE — TELEPHONE ENCOUNTER
Medication refilled for patient as he has been seen by APRN while in transition of providers to Dr Thomas

## 2021-10-29 ENCOUNTER — OFFICE VISIT (OUTPATIENT)
Dept: INTERNAL MEDICINE | Facility: CLINIC | Age: 66
End: 2021-10-29

## 2021-10-29 VITALS
HEART RATE: 77 BPM | OXYGEN SATURATION: 98 % | HEIGHT: 71 IN | TEMPERATURE: 98.2 F | BODY MASS INDEX: 28.7 KG/M2 | SYSTOLIC BLOOD PRESSURE: 134 MMHG | DIASTOLIC BLOOD PRESSURE: 82 MMHG | WEIGHT: 205 LBS

## 2021-10-29 DIAGNOSIS — K21.9 GASTROESOPHAGEAL REFLUX DISEASE WITHOUT ESOPHAGITIS: Chronic | ICD-10-CM

## 2021-10-29 DIAGNOSIS — E11.9 TYPE 2 DIABETES MELLITUS WITHOUT COMPLICATION, WITHOUT LONG-TERM CURRENT USE OF INSULIN (HCC): ICD-10-CM

## 2021-10-29 DIAGNOSIS — B00.9 HERPES: ICD-10-CM

## 2021-10-29 DIAGNOSIS — Z76.89 ENCOUNTER TO ESTABLISH CARE WITH NEW DOCTOR: Primary | ICD-10-CM

## 2021-10-29 DIAGNOSIS — I10 ESSENTIAL HYPERTENSION: ICD-10-CM

## 2021-10-29 PROCEDURE — 99214 OFFICE O/P EST MOD 30 MIN: CPT | Performed by: FAMILY MEDICINE

## 2021-10-29 RX ORDER — VALACYCLOVIR HYDROCHLORIDE 500 MG/1
500 TABLET, FILM COATED ORAL DAILY
Qty: 90 TABLET | Refills: 1 | Status: CANCELLED | OUTPATIENT
Start: 2021-10-29

## 2021-10-29 RX ORDER — PANTOPRAZOLE SODIUM 40 MG/1
40 TABLET, DELAYED RELEASE ORAL 2 TIMES DAILY
Qty: 180 TABLET | Refills: 3 | Status: SHIPPED | OUTPATIENT
Start: 2021-10-29 | End: 2022-01-20 | Stop reason: SDUPTHER

## 2021-10-29 RX ORDER — VALACYCLOVIR HYDROCHLORIDE 500 MG/1
1000 TABLET, FILM COATED ORAL 2 TIMES DAILY
Qty: 12 TABLET | Refills: 0
Start: 2021-10-29 | End: 2021-11-01

## 2021-10-29 RX ORDER — METFORMIN HYDROCHLORIDE 500 MG/1
500 TABLET, EXTENDED RELEASE ORAL
Qty: 90 TABLET | Refills: 3 | Status: SHIPPED | OUTPATIENT
Start: 2021-10-29 | End: 2021-12-28

## 2021-10-29 RX ORDER — VALACYCLOVIR HYDROCHLORIDE 500 MG/1
1000 TABLET, FILM COATED ORAL 2 TIMES DAILY
Qty: 40 TABLET | Refills: 0
Start: 2021-10-29 | End: 2021-10-29

## 2021-10-29 RX ORDER — LOSARTAN POTASSIUM 50 MG/1
50 TABLET ORAL DAILY
Qty: 90 TABLET | Refills: 0 | Status: SHIPPED | OUTPATIENT
Start: 2021-10-29 | End: 2022-02-01

## 2021-10-29 NOTE — PATIENT INSTRUCTIONS
Diabetes Mellitus and Nutrition, Adult  When you have diabetes, or diabetes mellitus, it is very important to have healthy eating habits because your blood sugar (glucose) levels are greatly affected by what you eat and drink. Eating healthy foods in the right amounts, at about the same times every day, can help you:  · Control your blood glucose.  · Lower your risk of heart disease.  · Improve your blood pressure.  · Reach or maintain a healthy weight.  What can affect my meal plan?  Every person with diabetes is different, and each person has different needs for a meal plan. Your health care provider may recommend that you work with a dietitian to make a meal plan that is best for you. Your meal plan may vary depending on factors such as:  · The calories you need.  · The medicines you take.  · Your weight.  · Your blood glucose, blood pressure, and cholesterol levels.  · Your activity level.  · Other health conditions you have, such as heart or kidney disease.  How do carbohydrates affect me?  Carbohydrates, also called carbs, affect your blood glucose level more than any other type of food. Eating carbs naturally raises the amount of glucose in your blood. Carb counting is a method for keeping track of how many carbs you eat. Counting carbs is important to keep your blood glucose at a healthy level, especially if you use insulin or take certain oral diabetes medicines.  It is important to know how many carbs you can safely have in each meal. This is different for every person. Your dietitian can help you calculate how many carbs you should have at each meal and for each snack.  How does alcohol affect me?  Alcohol can cause a sudden decrease in blood glucose (hypoglycemia), especially if you use insulin or take certain oral diabetes medicines. Hypoglycemia can be a life-threatening condition. Symptoms of hypoglycemia, such as sleepiness, dizziness, and confusion, are similar to symptoms of having too much  "alcohol.  · Do not drink alcohol if:  ? Your health care provider tells you not to drink.  ? You are pregnant, may be pregnant, or are planning to become pregnant.  · If you drink alcohol:  ? Do not drink on an empty stomach.  ? Limit how much you use to:  § 0-1 drink a day for women.  § 0-2 drinks a day for men.  ? Be aware of how much alcohol is in your drink. In the [place] one drink equals one 12 oz bottle of beer (355 mL), one 5 oz glass of wine (148 mL), or one 1½ oz glass of hard liquor (44 mL).  ? Keep yourself hydrated with water, diet soda, or unsweetened iced tea.  § Keep in mind that regular soda, juice, and other mixers may contain a lot of sugar and must be counted as carbs.  What are tips for following this plan?    Reading food labels  · Start by checking the serving size on the \"Nutrition Facts\" label of packaged foods and drinks. The amount of calories, carbs, fats, and other nutrients listed on the label is based on one serving of the item. Many items contain more than one serving per package.  · Check the total grams (g) of carbs in one serving. You can calculate the number of servings of carbs in one serving by dividing the total carbs by 15. For example, if a food has 30 g of total carbs per serving, it would be equal to 2 servings of carbs.  · Check the number of grams (g) of saturated fats and trans fats in one serving. Choose foods that have a low amount or none of these fats.  · Check the number of milligrams (mg) of salt (sodium) in one serving. Most people should limit total sodium intake to less than 2,300 mg per day.  · Always check the nutrition information of foods labeled as \"low-fat\" or \"nonfat.\" These foods may be higher in added sugar or refined carbs and should be avoided.  · Talk to your dietitian to identify your daily goals for nutrients listed on the label.  Shopping  · Avoid buying canned, pre-made, or processed foods. These foods tend to be high in fat, sodium, and added " sugar.  · Shop around the outside edge of the grocery store. This is where you will most often find fresh fruits and vegetables, bulk grains, fresh meats, and fresh dairy.  Cooking  · Use low-heat cooking methods, such as baking, instead of high-heat cooking methods like deep frying.  · Cook using healthy oils, such as olive, canola, or sunflower oil.  · Avoid cooking with butter, cream, or high-fat meats.  Meal planning  · Eat meals and snacks regularly, preferably at the same times every day. Avoid going long periods of time without eating.  · Eat foods that are high in fiber, such as fresh fruits, vegetables, beans, and whole grains. Talk with your dietitian about how many servings of carbs you can eat at each meal.  · Eat 4-6 oz (112-168 g) of lean protein each day, such as lean meat, chicken, fish, eggs, or tofu. One ounce (oz) of lean protein is equal to:  ? 1 oz (28 g) of meat, chicken, or fish.  ? 1 egg.  ? ¼ cup (62 g) of tofu.  · Eat some foods each day that contain healthy fats, such as avocado, nuts, seeds, and fish.  What foods should I eat?  Fruits  Berries. Apples. Oranges. Peaches. Apricots. Plums. Grapes. Greg. Papaya. Pomegranate. Kiwi. Cherries.  Vegetables  Lettuce. Spinach. Leafy greens, including kale, chard, marley greens, and mustard greens. Beets. Cauliflower. Cabbage. Broccoli. Carrots. Green beans. Tomatoes. Peppers. Onions. Cucumbers. [place] sprouts.  Grains  Whole grains, such as whole-wheat or whole-grain bread, crackers, tortillas, cereal, and pasta. Unsweetened oatmeal. Quinoa. Brown or wild rice.  Meats and other proteins  Seafood. Poultry without skin. Lean cuts of poultry and beef. Tofu. Nuts. Seeds.  Dairy  Low-fat or fat-free dairy products such as milk, yogurt, and cheese.  The items listed above may not be a complete list of foods and beverages you can eat. Contact a dietitian for more information.  What foods should I avoid?  Fruits  Fruits canned with  syrup.  Vegetables  Canned vegetables. Frozen vegetables with butter or cream sauce.  Grains  Refined white flour and flour products such as bread, pasta, snack foods, and cereals. Avoid all processed foods.  Meats and other proteins  Fatty cuts of meat. Poultry with skin. Breaded or fried meats. Processed meat. Avoid saturated fats.  Dairy  Full-fat yogurt, cheese, or milk.  Beverages  Sweetened drinks, such as soda or iced tea.  The items listed above may not be a complete list of foods and beverages you should avoid. Contact a dietitian for more information.  Questions to ask a health care provider  · Do I need to meet with a diabetes educator?  · Do I need to meet with a dietitian?  · What number can I call if I have questions?  · When are the best times to check my blood glucose?  Where to find more information:  · American Diabetes Association: diabetes.org  · Academy of Nutrition and Dietetics: www.eatright.org  · National [place] of Diabetes and Digestive and Kidney Diseases: www.niddk.nih.gov  · Association of Diabetes Care and Education Specialists: www.diabeteseducator.org  Summary  · It is important to have healthy eating habits because your blood sugar (glucose) levels are greatly affected by what you eat and drink.  · A healthy meal plan will help you control your blood glucose and maintain a healthy lifestyle.  · Your health care provider may recommend that you work with a dietitian to make a meal plan that is best for you.  · Keep in mind that carbohydrates (carbs) and alcohol have immediate effects on your blood glucose levels. It is important to count carbs and to use alcohol carefully.  This information is not intended to replace advice given to you by your health care provider. Make sure you discuss any questions you have with your health care provider.  Document Revised: 11/24/2020 Document Reviewed: 11/24/2020  Elsevier Patient Education © 2021 Elsevier Inc.

## 2021-10-29 NOTE — PROGRESS NOTES
"Chief Complaint  Establish Care and Diabetes    Subjective          Bob Biggs Sr. presents to [facility]  History of Present Illness     Prior PCP Owens    Hypertension - stable.  Patient taking medication as prescribed.  Denies chest pain, shortness of breath, headache, lower extremity edema.  Patient is taking losartan 50 mg.    GERD -taking pantoprazole. Stable and controlled.      Diabetes mellitus-needs A1c to check stability.  No new numbness, tingling.  Patient is taking Metformin  mg daily as prescribed.  No side effects.  Last A1c was 6.80.  Eye exam is up-to-date.    Herpes - not had an outbreak in years.  Currently on suppression and not sure why.  Interested in coming off and using valtrex as needed    Objective   Vital Signs:   /82 (BP Location: Left arm, Patient Position: Sitting, Cuff Size: Adult)   Pulse 77   Temp 98.2 °F (36.8 °C) (Temporal)   Ht 179.1 cm (70.5\")   Wt 93 kg (205 lb)   SpO2 98%   BMI 29.00 kg/m²     Physical Exam  Vitals and nursing note reviewed.   Constitutional:       General: He is not in acute distress.     Appearance: Normal appearance.   Cardiovascular:      Rate and Rhythm: Normal rate and regular rhythm.      Heart sounds: Normal heart sounds. No murmur heard.      Pulmonary:      Effort: Pulmonary effort is normal.      Breath sounds: Normal breath sounds.   Neurological:      Mental Status: He is alert.        Result Review :   The following data was reviewed by: Dustin Thomas MD on 10/29/2021:  Common labs    Common Labsle 2/15/21 2/15/21 2/15/21 2/15/21 3/16/21 3/16/21    1058 1058 1058 1058 1148 1148   Glucose      242 (A)   Glucose  172 (A)       BUN  17    14   Creatinine  0.99    1.02   eGFR Non  Am  76       eGFR African Am  92    89   Sodium  140    139   Potassium  4.4    4.3   Chloride  103    105   Calcium  9.1    8.6   Total Protein  7.2       Albumin  4.40       Total Bilirubin  0.5     "   Alkaline Phosphatase  55       AST (SGOT)  25       ALT (SGPT)  28       WBC    6.89 6.88    Hemoglobin    14.2 14.2    Hematocrit    44.4 42.7    Platelets    195 193    Total Cholesterol   189      Triglycerides   49      HDL Cholesterol   43      LDL Cholesterol    137 (A)      Hemoglobin A1C 6.80 (A)        (A) Abnormal value       Comments are available for some flowsheets but are not being displayed.                     Assessment and Plan    Diagnoses and all orders for this visit:    1. Encounter to establish care with new doctor (Primary)  -     Hemoglobin A1c    2. Essential hypertension  -     losartan (Cozaar) 50 MG tablet; Take 1 tablet by mouth Daily.  Dispense: 90 tablet; Refill: 0    3. Type 2 diabetes mellitus without complication, without long-term current use of insulin ([facility])  -     Hemoglobin A1c  -     metFORMIN ER (GLUCOPHAGE-XR) 500 MG 24 hr tablet; Take 1 tablet by mouth Daily With Breakfast.  Dispense: 90 tablet; Refill: 3    4. Gastroesophageal reflux disease without esophagitis  -     pantoprazole (PROTONIX) 40 MG EC tablet; Take 1 tablet by mouth 2 (Two) Times a Day.  Dispense: 180 tablet; Refill: 3    5. Herpes  -     Discontinue: valACYclovir (VALTREX) 500 MG tablet; Take 2 tablets by mouth 2 (Two) Times a Day for 10 days.  Dispense: 40 tablet; Refill: 0  -     valACYclovir (VALTREX) 500 MG tablet; Take 2 tablets by mouth 2 (Two) Times a Day for 3 days.  Dispense: 12 tablet; Refill: 0      Blood pressure is stable today so we will continue losartan.    We will get an A1c to check the stability of his diabetes.  Continue Metformin.    Continue pantoprazole.    Discontinue suppression for the herpes.  If he has further outbreaks, he was instructed to let me know and I will send him in treatment dose of the Valtrex.  I change the prescription in the system to show that he will need a recurrence dose for an immunocompetent patient if needed.        Follow Up   Return in about 6 months  (around 4/29/2022) for Annual physical.  Patient was given instructions and counseling regarding his condition or for health maintenance advice. Please see specific information pulled into the AVS if appropriate.

## 2021-10-30 LAB — HBA1C MFR BLD: 7.5 % (ref 4.8–5.6)

## 2021-12-28 DIAGNOSIS — E11.9 TYPE 2 DIABETES MELLITUS WITHOUT COMPLICATION, WITHOUT LONG-TERM CURRENT USE OF INSULIN (HCC): ICD-10-CM

## 2021-12-28 RX ORDER — METFORMIN HYDROCHLORIDE 500 MG/1
500 TABLET, EXTENDED RELEASE ORAL
Qty: 90 TABLET | Refills: 3 | Status: SHIPPED | OUTPATIENT
Start: 2021-12-28 | End: 2022-04-19 | Stop reason: SDUPTHER

## 2022-01-03 NOTE — TELEPHONE ENCOUNTER
----- Message from Agustina Mayfield MA sent at 11/28/2017  3:16 PM EST -----  Pt requests order for pneumonia and flu vaccine tomorrow   no

## 2022-01-20 DIAGNOSIS — K21.9 GASTROESOPHAGEAL REFLUX DISEASE WITHOUT ESOPHAGITIS: Chronic | ICD-10-CM

## 2022-01-20 RX ORDER — PANTOPRAZOLE SODIUM 40 MG/1
40 TABLET, DELAYED RELEASE ORAL 2 TIMES DAILY
Qty: 180 TABLET | Refills: 3 | Status: SHIPPED | OUTPATIENT
Start: 2022-01-20 | End: 2023-01-09

## 2022-01-20 NOTE — TELEPHONE ENCOUNTER
Caller: Bob Biggs Sr.    Relationship: Self    Best call back number: [phone]    Requested Prescriptions:   Requested Prescriptions     Pending Prescriptions Disp Refills   • pantoprazole (PROTONIX) 40 MG EC tablet 180 tablet 3     Sig: Take 1 tablet by mouth 2 (Two) Times a Day.        Pharmacy where request should be sent: [facility] #[id number] [address] AT [address] [place] [address] - [phone]  - [phone] FX     Additional details provided by patient: PATIENT HAS A 4 DAY SUPPLY     Does the patient have less than a 3 day supply:  [] Yes  [x] No    Talha Diane Rep   01/20/22 12:00 EST

## 2022-02-01 DIAGNOSIS — I10 ESSENTIAL HYPERTENSION: ICD-10-CM

## 2022-02-01 RX ORDER — LOSARTAN POTASSIUM 50 MG/1
50 TABLET ORAL DAILY
Qty: 90 TABLET | Refills: 0 | Status: SHIPPED | OUTPATIENT
Start: 2022-02-01 | End: 2022-05-23 | Stop reason: SDUPTHER

## 2022-04-18 ENCOUNTER — OFFICE VISIT (OUTPATIENT)
Dept: INTERNAL MEDICINE | Facility: CLINIC | Age: 67
End: 2022-04-18

## 2022-04-18 VITALS
HEART RATE: 78 BPM | WEIGHT: 205.6 LBS | SYSTOLIC BLOOD PRESSURE: 120 MMHG | TEMPERATURE: 97.8 F | DIASTOLIC BLOOD PRESSURE: 80 MMHG | HEIGHT: 70 IN | OXYGEN SATURATION: 97 % | BODY MASS INDEX: 29.43 KG/M2

## 2022-04-18 DIAGNOSIS — E11.9 TYPE 2 DIABETES MELLITUS WITHOUT COMPLICATION, WITHOUT LONG-TERM CURRENT USE OF INSULIN: ICD-10-CM

## 2022-04-18 DIAGNOSIS — Z13.220 SCREENING FOR LIPID DISORDERS: ICD-10-CM

## 2022-04-18 DIAGNOSIS — Z00.00 ENCOUNTER FOR HEALTH MAINTENANCE EXAMINATION: Primary | ICD-10-CM

## 2022-04-18 DIAGNOSIS — Z12.5 SCREENING FOR PROSTATE CANCER: ICD-10-CM

## 2022-04-18 PROCEDURE — 99397 PER PM REEVAL EST PAT 65+ YR: CPT | Performed by: FAMILY MEDICINE

## 2022-04-18 NOTE — PROGRESS NOTES
"Chief Complaint  Annual Exam    Subjective            Bob Biggs Sr. presents to [facility] PRIMARY [facility]ARE  History of Present Illness    CPE- Patient reporting that health is doing well overall.  Patient is here today for annual physical.  Eating a balanced diet.  Taking his chronic medication as prescribed.  Diabetes was out of goal last time.  He is taking metformin  mg daily.  Needs recheck as well.  It appears there was some type of glitch in the system and the A1c was not routed to me for review.  It looks like there are 2 results in the system but the one with the actual data looks like it went to a historical conversion with provider unknown as authorizing.  He says he was getting it through another avenue at the time which may explain it.  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  Discussed looking into a book called back  to look at different ways he can accomplish his goals at work without injuring his back.    Labs: Due for routine labs    Colorectal cancer screening: UTD      Review of Systems      Objective   Vital Signs:   /80 (BP Location: Left arm, Patient Position: Sitting, Cuff Size: Adult)   Pulse 78   Temp 97.8 °F (36.6 °C) (Temporal)   Ht 177.8 cm (70\")   Wt 93.3 kg (205 lb 9.6 oz)   SpO2 97%   BMI 29.50 kg/m²     Physical Exam  Vitals and nursing note reviewed.   Constitutional:       General: He is not in acute distress.     Appearance: Normal appearance.   HENT:      Right Ear: Tympanic membrane, ear canal and external ear normal.      Left Ear: Tympanic membrane, ear canal and external ear normal.      Nose: Nose normal.      Mouth/Throat:      Mouth: Mucous membranes are moist.   Eyes:      General:         Right eye: No discharge.         Left eye: No discharge.      Conjunctiva/sclera: Conjunctivae normal.   Cardiovascular:      Rate and Rhythm: Normal rate and regular rhythm.      Pulses: " Normal pulses.      Heart sounds: Normal heart sounds. No murmur heard.  Pulmonary:      Effort: Pulmonary effort is normal.      Breath sounds: Normal breath sounds.   Abdominal:      General: Bowel sounds are normal. There is no distension.      Palpations: Abdomen is soft.      Tenderness: There is no abdominal tenderness.   Musculoskeletal:      Cervical back: Neck supple.      Right lower leg: No edema.      Left lower leg: No edema.   Lymphadenopathy:      Cervical: No cervical adenopathy.   Skin:     General: Skin is warm.      Findings: No rash.   Neurological:      General: No focal deficit present.      Mental Status: He is alert. Mental status is at baseline.   Psychiatric:         Mood and Affect: Mood normal.         Behavior: Behavior normal.        Result Review :   The following data was reviewed by: Dustin Thomas MD on 04/18/2022:  Common labs    Common Labsle 10/29/21   Hemoglobin A1C 7.5 (A)   (A) Abnormal value       Comments are available for some flowsheets but are not being displayed.                     Assessment and Plan    Diagnoses and all orders for this visit:    1. Encounter for health maintenance examination (Primary)  -     CBC & Differential  -     Comprehensive Metabolic Panel  -     Hemoglobin A1c  -     Lipid Panel With LDL / HDL Ratio  -     PSA Screen  -     Microalbumin / Creatinine Urine Ratio - Urine, Clean Catch    2. Type 2 diabetes mellitus without complication, without long-term current use of insulin (HCC)  -     CBC & Differential  -     Comprehensive Metabolic Panel  -     Hemoglobin A1c  -     Microalbumin / Creatinine Urine Ratio - Urine, Clean Catch    3. Screening for lipid disorders  -     CBC & Differential  -     Comprehensive Metabolic Panel  -     Lipid Panel With LDL / HDL Ratio    4. Screening for prostate cancer  -     PSA Screen            The patient was counseled regarding nutrition, physical activity, healthy weight, injury prevention, misuse of  tobacco, alcohol and illicit drugs, mental health, immunizations, and screenings.      Follow Up   Return in about 6 months (around 10/20/2022) for Next scheduled follow up.  Patient was given instructions and counseling regarding his condition or for health maintenance advice. Please see specific information pulled into the AVS if appropriate.

## 2022-04-19 DIAGNOSIS — E11.9 TYPE 2 DIABETES MELLITUS WITHOUT COMPLICATION, WITHOUT LONG-TERM CURRENT USE OF INSULIN: ICD-10-CM

## 2022-04-19 LAB
ALBUMIN SERPL-MCNC: 4.6 G/DL (ref 3.8–4.8)
ALBUMIN/CREAT UR: 14 MG/G CREAT (ref 0–29)
ALBUMIN/GLOB SERPL: 1.6 {RATIO} (ref 1.2–2.2)
ALP SERPL-CCNC: 62 IU/L (ref 44–121)
ALT SERPL-CCNC: 22 IU/L (ref 0–44)
AST SERPL-CCNC: 23 IU/L (ref 0–40)
BASOPHILS # BLD AUTO: 0.1 X10E3/UL (ref 0–0.2)
BASOPHILS NFR BLD AUTO: 1 %
BILIRUB SERPL-MCNC: 0.5 MG/DL (ref 0–1.2)
BUN SERPL-MCNC: 15 MG/DL (ref 8–27)
BUN/CREAT SERPL: 15 (ref 10–24)
CALCIUM SERPL-MCNC: 9.2 MG/DL (ref 8.6–10.2)
CHLORIDE SERPL-SCNC: 106 MMOL/L (ref 96–106)
CHOLEST SERPL-MCNC: 195 MG/DL (ref 100–199)
CO2 SERPL-SCNC: 22 MMOL/L (ref 20–29)
CREAT SERPL-MCNC: 0.98 MG/DL (ref 0.76–1.27)
CREAT UR-MCNC: 133.8 MG/DL
EGFRCR SERPLBLD CKD-EPI 2021: 85 ML/MIN/1.73
EOSINOPHIL # BLD AUTO: 0.2 X10E3/UL (ref 0–0.4)
EOSINOPHIL NFR BLD AUTO: 2 %
ERYTHROCYTE [DISTWIDTH] IN BLOOD BY AUTOMATED COUNT: 12.5 % (ref 11.6–15.4)
GLOBULIN SER CALC-MCNC: 2.8 G/DL (ref 1.5–4.5)
GLUCOSE SERPL-MCNC: 182 MG/DL (ref 65–99)
HBA1C MFR BLD: 7.7 % (ref 4.8–5.6)
HCT VFR BLD AUTO: 44.7 % (ref 37.5–51)
HDLC SERPL-MCNC: 40 MG/DL
HGB BLD-MCNC: 14.5 G/DL (ref 13–17.7)
IMM GRANULOCYTES # BLD AUTO: 0 X10E3/UL (ref 0–0.1)
IMM GRANULOCYTES NFR BLD AUTO: 0 %
LDLC SERPL CALC-MCNC: 145 MG/DL (ref 0–99)
LDLC/HDLC SERPL: 3.6 RATIO (ref 0–3.6)
LYMPHOCYTES # BLD AUTO: 2 X10E3/UL (ref 0.7–3.1)
LYMPHOCYTES NFR BLD AUTO: 27 %
MCH RBC QN AUTO: 29.2 PG (ref 26.6–33)
MCHC RBC AUTO-ENTMCNC: 32.4 G/DL (ref 31.5–35.7)
MCV RBC AUTO: 90 FL (ref 79–97)
MICROALBUMIN UR-MCNC: 18.7 UG/ML
MONOCYTES # BLD AUTO: 0.5 X10E3/UL (ref 0.1–0.9)
MONOCYTES NFR BLD AUTO: 7 %
NEUTROPHILS # BLD AUTO: 4.8 X10E3/UL (ref 1.4–7)
NEUTROPHILS NFR BLD AUTO: 63 %
PLATELET # BLD AUTO: 222 X10E3/UL (ref 150–450)
POTASSIUM SERPL-SCNC: 4.8 MMOL/L (ref 3.5–5.2)
PROT SERPL-MCNC: 7.4 G/DL (ref 6–8.5)
PSA SERPL-MCNC: 0.9 NG/ML (ref 0–4)
RBC # BLD AUTO: 4.97 X10E6/UL (ref 4.14–5.8)
SODIUM SERPL-SCNC: 140 MMOL/L (ref 134–144)
TRIGL SERPL-MCNC: 56 MG/DL (ref 0–149)
VLDLC SERPL CALC-MCNC: 10 MG/DL (ref 5–40)
WBC # BLD AUTO: 7.5 X10E3/UL (ref 3.4–10.8)

## 2022-04-19 RX ORDER — METFORMIN HYDROCHLORIDE 500 MG/1
TABLET, EXTENDED RELEASE ORAL
Qty: 270 TABLET | Refills: 3 | Status: SHIPPED | OUTPATIENT
Start: 2022-04-19 | End: 2022-10-23 | Stop reason: SDUPTHER

## 2022-04-20 ENCOUNTER — TELEPHONE (OUTPATIENT)
Dept: INTERNAL MEDICINE | Facility: CLINIC | Age: 67
End: 2022-04-20

## 2022-04-20 NOTE — TELEPHONE ENCOUNTER
Patient advised.      Patient doesn't want to start the metformin he thinks that too much for him he wants to work on his diet inside

## 2022-04-20 NOTE — TELEPHONE ENCOUNTER
Hub staff attempted to follow warm transfer process and was unsuccessful     Caller: Bob Biggs Sr.    Relationship to patient: Self    Best call back number: [phone]     Patient is needing: PATIENT RETURNING CALL CONCERNING LABS.

## 2022-04-20 NOTE — TELEPHONE ENCOUNTER
PATIENT CALLED VERY UPSET THAT NO ONE HAS CALLED BACK YET TO PROVIDE LAB RESULTS.    HUB STAFF ATTEMPTED TO FOLLOW WARM TRANSFER PROCESS AND WAS UNSUCCESSFUL    PLEASE CALL BACK    [phone]

## 2022-04-25 ENCOUNTER — TELEPHONE (OUTPATIENT)
Dept: INTERNAL MEDICINE | Facility: CLINIC | Age: 67
End: 2022-04-25

## 2022-04-25 NOTE — TELEPHONE ENCOUNTER
Caller: Bob Biggs Sr.    Relationship: Self    Best call back number: [phone]    Who are you requesting to speak with (clinical staff, provider,  specific staff member):     What was the call regarding: ATTEMPTED TO WARM TRANSFER BUT WAS UNSUCCESSFUL.     PATIENT WANTED TO LET THE  KNOW THAT HE WILL BE THERE AT 10 AM THIS MORNING TO  HIS PAPERWORK.

## 2022-04-25 NOTE — TELEPHONE ENCOUNTER
Caller: Bob Biggs Sr.    Relationship: Self    Best call back number: [phone]    What is the best time to reach you: ANYTIME, AS SOON AS POSSIBLE    Who are you requesting to speak with (clinical staff, provider,  specific staff member): DR. DE SANTIAGO OR MEDICAL ASSISTANT     What was the call regarding: PATIENT STATES HE HAD A MISSED CALL FROM DR. DE SANTIAGO ASSISTANT ON Friday, 04/22/2022.     PATIENT IS REQUESTING A CALL BACK.

## 2022-04-25 NOTE — TELEPHONE ENCOUNTER
Needing waist measurement for physical forms. Patient is on his way to the office to have waist measured and  form

## 2022-05-23 ENCOUNTER — TELEPHONE (OUTPATIENT)
Dept: INTERNAL MEDICINE | Facility: CLINIC | Age: 67
End: 2022-05-23

## 2022-05-23 DIAGNOSIS — I10 ESSENTIAL HYPERTENSION: ICD-10-CM

## 2022-05-23 RX ORDER — LOSARTAN POTASSIUM 50 MG/1
50 TABLET ORAL DAILY
Qty: 90 TABLET | Refills: 0 | Status: SHIPPED | OUTPATIENT
Start: 2022-05-23 | End: 2022-08-24

## 2022-05-23 NOTE — TELEPHONE ENCOUNTER
Caller: Bob Biggs Sr.    Relationship: Self    Best call back number: [phone]    Requested Prescriptions:   Requested Prescriptions     Pending Prescriptions Disp Refills   • losartan (COZAAR) 50 MG tablet 90 tablet 0     Sig: Take 1 tablet by mouth Daily.        Pharmacy where request should be sent: [facility] #[id number] [place] [id number] [facility] AT [facility] - [phone]  - [phone]      Additional details provided by patient:    Does the patient have less than a 3 day supply:  [] Yes  [x] No    Talha Vargas Rep   05/23/22 08:45 EDT

## 2022-05-26 DIAGNOSIS — R21 RASH: ICD-10-CM

## 2022-05-26 RX ORDER — BETAMETHASONE DIPROPIONATE 0.5 MG/G
1 CREAM TOPICAL AS NEEDED
Qty: 15 G | Refills: 1 | Status: SHIPPED | OUTPATIENT
Start: 2022-05-26

## 2022-05-26 NOTE — PROGRESS NOTES
Discharge Summary  Discharge Summary from Physical Therapy Report    Patient Information  Bob HARTLEY Kalyan Sr.  1955    Dates  PT visit: 7/11-8/21/19  Number of Visits: 9     Discharge Status of Patient: See MD Note dated 8/21/19    Goals: Partially Met    Visit Diagnoses:  No diagnosis found.    Discharge Plan: Patient to return to referring/providing physician    Comments was referred to specialist          Mayuri Mauricio, PT  Physical Therapist                    
65

## 2022-05-26 NOTE — TELEPHONE ENCOUNTER
Caller: Bob Biggs Sr.    Relationship: Self    Best call back [phone]    Requested Prescriptions:   Requested Prescriptions     Pending Prescriptions Disp Refills   • betamethasone dipropionate 0.05 % cream 15 g 1     Sig: Apply 1 application topically to the appropriate area as directed As Needed for Rash.        Pharmacy where request should be sent: [facility] #[id number] [address] [phone] [place] [phone]      Additional details provided by patient: OUT OF MEDICATION     Does the patient have less than a 3 day supply:  [x] Yes  [] No    Joann Guy, Talha Rep   05/26/22 09:30 EDT

## 2022-07-28 ENCOUNTER — TELEPHONE (OUTPATIENT)
Dept: INTERNAL MEDICINE | Facility: CLINIC | Age: 67
End: 2022-07-28

## 2022-07-28 DIAGNOSIS — E11.9 TYPE 2 DIABETES MELLITUS WITHOUT COMPLICATION, WITHOUT LONG-TERM CURRENT USE OF INSULIN: Primary | ICD-10-CM

## 2022-07-28 RX ORDER — LANCETS
EACH MISCELLANEOUS
Qty: 200 EACH | Refills: 0 | Status: SHIPPED | OUTPATIENT
Start: 2022-07-28

## 2022-07-28 RX ORDER — LANCETS
EACH MISCELLANEOUS
Qty: 200 EACH | Refills: 0 | Status: SHIPPED | OUTPATIENT
Start: 2022-07-28 | End: 2022-07-28

## 2022-07-28 RX ORDER — LANCETS 33 GAUGE
EACH MISCELLANEOUS
Qty: 200 EACH | Refills: 0 | Status: CANCELLED | OUTPATIENT
Start: 2022-07-28

## 2022-07-28 NOTE — TELEPHONE ENCOUNTER
Caller: Bob Biggs Sr.    Relationship: Self    Best call back number: [phone]    Who are you requesting to speak with (clinical staff, provider,  specific staff member): DR. DE SANTIAGO            PATIENT STATES HE IS NEEDING A NEW PRESCRIPTION FOR THE ONE TOUCH LANCETS misc.     PATIENTS PREFERRED PHARMACY: [facility] #[id number] [address] - [phone] [facility] [phone] FX

## 2022-08-09 NOTE — PROGRESS NOTES
Physical Therapy Daily Progress Note      Visit # 5      Subjective   Doing ok until I reached behind my back    Objective   See Exercise, Manual, and Modality Logs for complete treatment.       Assessment/Plan    Overall improved but still easily aggravated with mm and neural tension.      Continue to progress stretching and postural ex as kelly             Manual Therapy:    8     mins  89455;  Therapeutic Exercise:    32     mins  37816;       Timed Treatment:   40   mins   Total Treatment:     55   mins    Mayuri Mauricio PT  Physical Therapist  
No sleep device

## 2022-08-23 DIAGNOSIS — I10 ESSENTIAL HYPERTENSION: ICD-10-CM

## 2022-08-24 RX ORDER — LOSARTAN POTASSIUM 50 MG/1
50 TABLET ORAL DAILY
Qty: 90 TABLET | Refills: 0 | Status: SHIPPED | OUTPATIENT
Start: 2022-08-24 | End: 2022-12-05

## 2022-10-20 ENCOUNTER — OFFICE VISIT (OUTPATIENT)
Dept: INTERNAL MEDICINE | Facility: CLINIC | Age: 67
End: 2022-10-20

## 2022-10-20 VITALS
SYSTOLIC BLOOD PRESSURE: 132 MMHG | HEIGHT: 70 IN | DIASTOLIC BLOOD PRESSURE: 90 MMHG | WEIGHT: 191 LBS | RESPIRATION RATE: 18 BRPM | HEART RATE: 71 BPM | TEMPERATURE: 98 F | BODY MASS INDEX: 27.35 KG/M2 | OXYGEN SATURATION: 98 %

## 2022-10-20 DIAGNOSIS — K21.9 GASTROESOPHAGEAL REFLUX DISEASE WITHOUT ESOPHAGITIS: Chronic | ICD-10-CM

## 2022-10-20 DIAGNOSIS — E11.9 TYPE 2 DIABETES MELLITUS WITHOUT COMPLICATION, WITHOUT LONG-TERM CURRENT USE OF INSULIN: Primary | ICD-10-CM

## 2022-10-20 DIAGNOSIS — I10 ESSENTIAL HYPERTENSION: ICD-10-CM

## 2022-10-20 DIAGNOSIS — Z23 NEED FOR INFLUENZA VACCINATION: ICD-10-CM

## 2022-10-20 LAB — HBA1C MFR BLD: 6.3 % (ref 4.8–5.6)

## 2022-10-20 PROCEDURE — 90662 IIV NO PRSV INCREASED AG IM: CPT | Performed by: FAMILY MEDICINE

## 2022-10-20 PROCEDURE — 99214 OFFICE O/P EST MOD 30 MIN: CPT | Performed by: FAMILY MEDICINE

## 2022-10-20 PROCEDURE — 90471 IMMUNIZATION ADMIN: CPT | Performed by: FAMILY MEDICINE

## 2022-10-20 NOTE — PROGRESS NOTES
"Chief Complaint  Follow-up and Diabetes    Subjective        Bob Biggs Sr. presents to [facility] PRIMARY CARE  History of Present Illness     Hypertension - stable.  Patient taking medication as prescribed.  Denies chest pain, shortness of breath, headache, lower extremity edema.  Patient is taking losartan 50 mg.     GERD -taking pantoprazole. Stable and controlled.       Diabetes mellitus-needs A1c to check stability.  No new numbness, tingling.  Patient is taking Metformin  mg daily, he decided not to increase but change eating habits.  .  No side effects.  Last A1c was 7.7 which is out of range for his age. After change in diet, sugars ranging from 90s - 120s usually.        Objective   Vital Signs:  /90 (BP Location: Right arm, Patient Position: Sitting, Cuff Size: Large Adult)   Pulse 71   Temp 98 °F (36.7 °C)   Resp 18   Ht 177.8 cm (70\")   Wt 86.6 kg (191 lb)   SpO2 98%   BMI 27.41 kg/m²   Estimated body mass index is 27.41 kg/m² as calculated from the following:    Height as of this encounter: 177.8 cm (70\").    Weight as of this encounter: 86.6 kg (191 lb).          Physical Exam  Vitals and nursing note reviewed.   Constitutional:       General: He is not in acute distress.     Appearance: Normal appearance.   Cardiovascular:      Rate and Rhythm: Normal rate and regular rhythm.      Heart sounds: Normal heart sounds. No murmur heard.  Pulmonary:      Effort: Pulmonary effort is normal.      Breath sounds: Normal breath sounds.   Neurological:      Mental Status: He is alert.        Result Review :  The following data was reviewed by: Dustin Thomas MD on 10/20/2022:  Common labs    Common Labs 10/29/21 4/18/22 4/18/22 4/18/22 4/18/22 4/18/22 4/18/22     1208 1208 1208 1208 1208 1208   Glucose   182 (A)       BUN   15       Creatinine   0.98       Sodium   140       Potassium   4.8       Chloride   106       Calcium   9.2       Total Protein   7.4     "   Albumin   4.6       Total Bilirubin   0.5       Alkaline Phosphatase   62       AST (SGOT)   23       ALT (SGPT)   22       WBC  7.5        Hemoglobin  14.5        Hematocrit  44.7        Platelets  222        Total Cholesterol     195     Triglycerides     56     HDL Cholesterol     40     LDL Cholesterol      145 (A)     Hemoglobin A1C 7.5 (A)   7.7 (A)      Microalbumin, Urine       18.7   PSA      0.9    (A) Abnormal value       Comments are available for some flowsheets but are not being displayed.                     Assessment and Plan   Diagnoses and all orders for this visit:    1. Type 2 diabetes mellitus without complication, without long-term current use of insulin (HCC) (Primary)  -     Hemoglobin A1c    2. Need for influenza vaccination  -     Fluzone High-Dose 65+yrs    3. Essential hypertension    4. Gastroesophageal reflux disease without esophagitis      Stable chronic conditions as above.  If A1c improving, continue lifestyle and once daily metformin.  A1c today.  Continue BP and GERD meds as above.             Follow Up   Return in about 27 weeks (around 4/27/2023).  Patient was given instructions and counseling regarding his condition or for health maintenance advice. Please see specific information pulled into the AVS if appropriate.

## 2022-10-23 DIAGNOSIS — E11.9 TYPE 2 DIABETES MELLITUS WITHOUT COMPLICATION, WITHOUT LONG-TERM CURRENT USE OF INSULIN: ICD-10-CM

## 2022-10-23 RX ORDER — METFORMIN HYDROCHLORIDE 500 MG/1
500 TABLET, EXTENDED RELEASE ORAL
Qty: 90 TABLET | Refills: 4 | Status: SHIPPED | OUTPATIENT
Start: 2022-10-23

## 2022-12-05 DIAGNOSIS — I10 ESSENTIAL HYPERTENSION: ICD-10-CM

## 2022-12-05 RX ORDER — LOSARTAN POTASSIUM 50 MG/1
50 TABLET ORAL DAILY
Qty: 90 TABLET | Refills: 1 | Status: SHIPPED | OUTPATIENT
Start: 2022-12-05

## 2022-12-05 NOTE — TELEPHONE ENCOUNTER
Caller: Bob Biggs Sr.    Relationship to patient: Self    Best call back number: [phone]    Patient is needing: PATIENT CALLED TO CHECK ON THE STATUS OF THIS MED REFILL, THERE ARE NO MORE REFILLS AND HE STATES THAT HE'S BEEN CALLING [employer] FOR TWO WEEKS AND THEY HAVE BEEN TRYING TO REACH THE OFFICE BUT THEY DIDN'T HAVE ANY RESPONSE. PATIENT HAS FIVE PILLS LEFT.

## 2023-01-07 DIAGNOSIS — K21.9 GASTROESOPHAGEAL REFLUX DISEASE WITHOUT ESOPHAGITIS: Chronic | ICD-10-CM

## 2023-01-09 RX ORDER — PANTOPRAZOLE SODIUM 40 MG/1
TABLET, DELAYED RELEASE ORAL
Qty: 180 TABLET | Refills: 3 | Status: SHIPPED | OUTPATIENT
Start: 2023-01-09

## 2023-04-27 ENCOUNTER — OFFICE VISIT (OUTPATIENT)
Dept: INTERNAL MEDICINE | Facility: CLINIC | Age: 68
End: 2023-04-27
Payer: COMMERCIAL

## 2023-04-27 VITALS
HEIGHT: 70 IN | OXYGEN SATURATION: 99 % | BODY MASS INDEX: 26.63 KG/M2 | WEIGHT: 186 LBS | SYSTOLIC BLOOD PRESSURE: 140 MMHG | HEART RATE: 66 BPM | DIASTOLIC BLOOD PRESSURE: 87 MMHG | RESPIRATION RATE: 18 BRPM

## 2023-04-27 DIAGNOSIS — E11.9 TYPE 2 DIABETES MELLITUS WITHOUT COMPLICATION, WITHOUT LONG-TERM CURRENT USE OF INSULIN: ICD-10-CM

## 2023-04-27 DIAGNOSIS — Z00.00 ENCOUNTER FOR HEALTH MAINTENANCE EXAMINATION: Primary | ICD-10-CM

## 2023-04-27 DIAGNOSIS — I10 ESSENTIAL HYPERTENSION: ICD-10-CM

## 2023-04-27 DIAGNOSIS — Z12.5 SCREENING FOR PROSTATE CANCER: ICD-10-CM

## 2023-04-27 DIAGNOSIS — M54.2 CERVICALGIA: ICD-10-CM

## 2023-04-27 RX ORDER — CYCLOBENZAPRINE HCL 10 MG
10 TABLET ORAL DAILY PRN
Qty: 30 TABLET | Refills: 1 | Status: SHIPPED | OUTPATIENT
Start: 2023-04-27

## 2023-04-27 RX ORDER — LOSARTAN POTASSIUM 50 MG/1
50 TABLET ORAL DAILY
Qty: 90 TABLET | Refills: 4 | Status: SHIPPED | OUTPATIENT
Start: 2023-04-27

## 2023-04-27 RX ORDER — METFORMIN HYDROCHLORIDE 500 MG/1
500 TABLET, EXTENDED RELEASE ORAL
Qty: 90 TABLET | Refills: 4 | Status: SHIPPED | OUTPATIENT
Start: 2023-04-27

## 2023-04-27 NOTE — PROGRESS NOTES
"Chief Complaint   Patient presents with   • Annual Exam       Subjective       CPE- Patient is here today for annual physical.  He is doing well on his medications for his blood pressure and diabetes.  He will get his labs today to check on the stability of the diabetes.    Labs: Due for routine labs    Colorectal cancer screening: Up-to-date, next due in 2029      Vitals:    04/27/23 0933   BP: 140/87   BP Location: Left arm   Patient Position: Sitting   Cuff Size: Small Adult   Pulse: 66   Resp: 18   SpO2: 99%   Weight: 84.4 kg (186 lb)   Height: 177.8 cm (70\")         Physical Exam  Vitals and nursing note reviewed.   Constitutional:       General: He is not in acute distress.     Appearance: Normal appearance.   HENT:      Right Ear: Tympanic membrane, ear canal and external ear normal.      Left Ear: Tympanic membrane, ear canal and external ear normal.      Nose: Nose normal.      Mouth/Throat:      Mouth: Mucous membranes are moist.   Eyes:      General:         Right eye: No discharge.         Left eye: No discharge.      Conjunctiva/sclera: Conjunctivae normal.   Cardiovascular:      Rate and Rhythm: Normal rate and regular rhythm.      Pulses: Normal pulses.      Heart sounds: Normal heart sounds.   Pulmonary:      Effort: Pulmonary effort is normal.      Breath sounds: Normal breath sounds.   Abdominal:      General: Bowel sounds are normal. There is no distension.      Palpations: Abdomen is soft.      Tenderness: There is no abdominal tenderness.   Musculoskeletal:      Cervical back: Neck supple.      Right lower leg: No edema.      Left lower leg: No edema.   Lymphadenopathy:      Cervical: No cervical adenopathy.   Skin:     General: Skin is warm.      Findings: No rash.   Neurological:      General: No focal deficit present.      Mental Status: He is alert. Mental status is at baseline.   Psychiatric:         Mood and Affect: Mood normal.         Behavior: Behavior normal.           Common labs  "       10/20/2022    09:27   Common Labs   Hemoglobin A1C 6.30             Diagnoses and all orders for this visit:    1. Encounter for health maintenance examination (Primary)  -     CBC (No Diff)  -     Comprehensive Metabolic Panel  -     PSA Screen    2. Type 2 diabetes mellitus without complication, without long-term current use of insulin  -     CBC (No Diff)  -     Comprehensive Metabolic Panel  -     Hemoglobin A1c  -     Lipid Panel With LDL / HDL Ratio  -     Microalbumin / Creatinine Urine Ratio - Urine, Clean Catch  -     metFORMIN ER (GLUCOPHAGE-XR) 500 MG 24 hr tablet; Take 1 tablet by mouth Daily With Breakfast.  Dispense: 90 tablet; Refill: 4    3. Screening for prostate cancer  -     PSA Screen    4. Cervicalgia  Comments:  needs heat, massage, stretching (demonstrated for patient); he declines PT at this time  Orders:  -     cyclobenzaprine (FLEXERIL) 10 MG tablet; Take 1 tablet by mouth Daily As Needed for Muscle Spasms.  Dispense: 30 tablet; Refill: 1    5. Essential hypertension  -     losartan (COZAAR) 50 MG tablet; Take 1 tablet by mouth Daily.  Dispense: 90 tablet; Refill: 4           The patient was counseled regarding nutrition, physical activity, healthy weight, injury prevention, misuse of tobacco, alcohol and illicit drugs, mental health, immunizations, and screenings.    Return in about 6 months (around 10/30/2023) for Next scheduled follow up.

## 2023-04-28 LAB
ALBUMIN SERPL-MCNC: 4.5 G/DL (ref 3.5–5.2)
ALBUMIN/CREAT UR: 5 MG/G CREAT (ref 0–29)
ALBUMIN/GLOB SERPL: 1.8 G/DL
ALP SERPL-CCNC: 43 U/L (ref 39–117)
ALT SERPL-CCNC: 36 U/L (ref 1–41)
AST SERPL-CCNC: 32 U/L (ref 1–40)
BILIRUB SERPL-MCNC: 0.6 MG/DL (ref 0–1.2)
BUN SERPL-MCNC: 12 MG/DL (ref 8–23)
BUN/CREAT SERPL: 13.3 (ref 7–25)
CALCIUM SERPL-MCNC: 9.5 MG/DL (ref 8.6–10.5)
CHLORIDE SERPL-SCNC: 106 MMOL/L (ref 98–107)
CHOLEST SERPL-MCNC: 188 MG/DL (ref 0–200)
CO2 SERPL-SCNC: 26.8 MMOL/L (ref 22–29)
CREAT SERPL-MCNC: 0.9 MG/DL (ref 0.76–1.27)
CREAT UR-MCNC: 132.3 MG/DL
EGFRCR SERPLBLD CKD-EPI 2021: 93 ML/MIN/1.73
ERYTHROCYTE [DISTWIDTH] IN BLOOD BY AUTOMATED COUNT: 13 % (ref 12.3–15.4)
GLOBULIN SER CALC-MCNC: 2.5 GM/DL
GLUCOSE SERPL-MCNC: 111 MG/DL (ref 65–99)
HBA1C MFR BLD: 6.1 % (ref 4.8–5.6)
HCT VFR BLD AUTO: 41.7 % (ref 37.5–51)
HDLC SERPL-MCNC: 52 MG/DL (ref 40–60)
HGB BLD-MCNC: 13.6 G/DL (ref 13–17.7)
LDLC SERPL CALC-MCNC: 126 MG/DL (ref 0–100)
LDLC/HDLC SERPL: 2.42 {RATIO}
MCH RBC QN AUTO: 29.5 PG (ref 26.6–33)
MCHC RBC AUTO-ENTMCNC: 32.6 G/DL (ref 31.5–35.7)
MCV RBC AUTO: 90.5 FL (ref 79–97)
MICROALBUMIN UR-MCNC: 7.2 UG/ML
PLATELET # BLD AUTO: 204 10*3/MM3 (ref 140–450)
POTASSIUM SERPL-SCNC: 4.5 MMOL/L (ref 3.5–5.2)
PROT SERPL-MCNC: 7 G/DL (ref 6–8.5)
PSA SERPL-MCNC: 1.29 NG/ML (ref 0–4)
RBC # BLD AUTO: 4.61 10*6/MM3 (ref 4.14–5.8)
SODIUM SERPL-SCNC: 139 MMOL/L (ref 136–145)
TRIGL SERPL-MCNC: 51 MG/DL (ref 0–150)
VLDLC SERPL CALC-MCNC: 10 MG/DL (ref 5–40)
WBC # BLD AUTO: 5.96 10*3/MM3 (ref 3.4–10.8)

## 2023-06-02 ENCOUNTER — TELEPHONE (OUTPATIENT)
Dept: INTERNAL MEDICINE | Facility: CLINIC | Age: 68
End: 2023-06-02

## 2023-06-02 DIAGNOSIS — I10 ESSENTIAL HYPERTENSION: ICD-10-CM

## 2023-06-02 RX ORDER — LOSARTAN POTASSIUM 50 MG/1
50 TABLET ORAL DAILY
Qty: 90 TABLET | Refills: 4 | Status: CANCELLED | OUTPATIENT
Start: 2023-06-02

## 2023-06-02 RX ORDER — LOSARTAN POTASSIUM 50 MG/1
50 TABLET ORAL DAILY
Qty: 90 TABLET | Refills: 4 | Status: SHIPPED | OUTPATIENT
Start: 2023-06-02

## 2023-06-02 NOTE — TELEPHONE ENCOUNTER
Caller: Bob Biggs Sr.    Relationship: Self    Best call back number: [phone]    Requested Prescriptions:   Requested Prescriptions     Pending Prescriptions Disp Refills   • losartan (COZAAR) 50 MG tablet 90 tablet 4     Sig: Take 1 tablet by mouth Daily.        Pharmacy where request should be sent: [facility] #[id number] [facility] [address] [facility] AT [address] [place] [address] - [phone] [facility] [phone] FX     Last office visit with prescribing clinician: 4/27/2023   Last telemedicine visit with prescribing clinician: Visit date not found   Next office visit with prescribing clinician: 11/2/2023     Additional details provided by patient:     Does the patient have less than a 3 day supply:  [] Yes  [] No    Would you like a call back once the refill request has been completed: [] Yes [] No    If the office needs to give you a call back, can they leave a voicemail: [] Yes [] No    Talha Soares   06/02/23 11:18 EDT   
yes

## 2023-06-02 NOTE — TELEPHONE ENCOUNTER
I refilled the losartan.  Not sure about the physical being mailed to the address.  Can you check protocol for that Eleni?

## 2023-06-02 NOTE — TELEPHONE ENCOUNTER
Caller: Bob Biggs Sr.    Relationship: Self    Best call back number: [phone]    What was the call regarding: PATIENT WOULD LIKE TO REQUEST HIS LAST PHYSICAL APPOINTMENT VISIT BE MAILED TO HIS ADDRESS.

## 2023-06-02 NOTE — TELEPHONE ENCOUNTER
Rx Refill Note  Requested Prescriptions      No prescriptions requested or ordered in this encounter      Last office visit with prescribing clinician: 4/27/2023   Last telemedicine visit with prescribing clinician: Visit date not found   Next office visit with prescribing clinician: 11/2/2023     Destiny Lewis MA  06/02/23, 11:25 EDT

## 2023-09-13 DIAGNOSIS — R21 RASH: ICD-10-CM

## 2023-09-21 RX ORDER — BETAMETHASONE DIPROPIONATE 0.5 MG/G
CREAM TOPICAL
Qty: 15 G | Refills: 1 | Status: SHIPPED | OUTPATIENT
Start: 2023-09-21

## 2023-09-21 NOTE — TELEPHONE ENCOUNTER
Rx Refill Note  Requested Prescriptions     Pending Prescriptions Disp Refills    betamethasone, augmented, (DIPROLENE) 0.05 % cream [Pharmacy Med Name: BETAMETHASONE DIP 0.05% AUG CRM 15G] 15 g      Sig: APPLY ONE APPLICATION TOPICALLY TO THE APPROPRIATE AREA AS DIRECTED AS NEEDED FOR RASH      Last office visit with prescribing clinician: 4/27/2023   Last telemedicine visit with prescribing clinician: Visit date not found   Next office visit with prescribing clinician: 11/2/2023

## 2023-11-08 ENCOUNTER — TELEPHONE (OUTPATIENT)
Dept: INTERNAL MEDICINE | Facility: CLINIC | Age: 68
End: 2023-11-08
Payer: COMMERCIAL

## 2023-11-08 NOTE — TELEPHONE ENCOUNTER
Caller: Bob Biggs Sr.    Relationship to patient: Self    Best call back number: [phone]     Patient is needing: PATIENT IS NEEDING REFILL OF PANTOPRAZOLE AND PHARMACY IS NOW STATING HE NEEDS A PRIOR AUTHORIZATION DUE TO CHANGE IN INSURANCE. ONLY HAS 3 DAYS LEFT. PLEASE CALL AND ADVISE.

## 2023-11-09 NOTE — TELEPHONE ENCOUNTER
PATIENT IS CALLING BACK TO CHECK THE STATUS OF THIS PA.     PATIENT HAS 1 MORE TABLET LEFT OF THIS MEDICATION.

## 2023-11-10 DIAGNOSIS — K21.9 GASTROESOPHAGEAL REFLUX DISEASE WITHOUT ESOPHAGITIS: Chronic | ICD-10-CM

## 2023-11-10 NOTE — TELEPHONE ENCOUNTER
Pt is stating that the pharmacy is only willing to cover once a day  can you please rewrite for this medication pt is out

## 2023-11-10 NOTE — TELEPHONE ENCOUNTER
Spoke with pt and PA has been completed also he is okay with just anther rx for once a day which insurance will cover

## 2023-11-13 RX ORDER — PANTOPRAZOLE SODIUM 40 MG/1
40 TABLET, DELAYED RELEASE ORAL 2 TIMES DAILY
Qty: 180 TABLET | Refills: 1 | Status: SHIPPED | OUTPATIENT
Start: 2023-11-13

## 2023-11-13 NOTE — TELEPHONE ENCOUNTER
PATIENT CALLED TO CHECK THE STATUS OF THIS REQUEST - PATIENT WAS TOLD BY THE OFFICE THAT THIS MEDICATION WOULD BE READY TO  ON 11/10/23.    PATIENT IS COMPLETELY OUT OF MEDICATION.    PLEASE ADVISE  [phone]

## 2023-11-13 NOTE — TELEPHONE ENCOUNTER
Called pt and LMOM that his medication was approved by his insurance company but [facility] is out of his medication is why they haven't gave it to him.  [facility] said they [facility].  Hub please inform patient

## 2023-12-12 DIAGNOSIS — E11.9 TYPE 2 DIABETES MELLITUS WITHOUT COMPLICATION, WITHOUT LONG-TERM CURRENT USE OF INSULIN: ICD-10-CM

## 2023-12-12 DIAGNOSIS — E08.00 DIABETES MELLITUS DUE TO UNDERLYING CONDITION WITH HYPEROSMOLARITY WITHOUT COMA, WITHOUT LONG-TERM CURRENT USE OF INSULIN: ICD-10-CM

## 2023-12-12 RX ORDER — LANCETS
EACH MISCELLANEOUS
Qty: 200 EACH | Refills: 4 | Status: SHIPPED | OUTPATIENT
Start: 2023-12-12

## 2023-12-12 NOTE — TELEPHONE ENCOUNTER
ilsa    Caller: Bob Biggs Sr.    Relationship: Self    Best call back number: [phone]    Requested Prescriptions:   Requested Prescriptions     Pending Prescriptions Disp Refills    Lancets (onetouch ultrasoft) lancets 200 each 0     Sig: Check Blood Sugar two times a day as needed E11.9        Pharmacy where request should be sent: [facility] #[id number] [place] [id number] [facility] AT [address] [place] [address] - [phone] [facility] [phone]      Last office visit with prescribing clinician: 4/27/2023   Last telemedicine visit with prescribing clinician: Visit date not found   Next office visit with prescribing clinician: 1/8/2024     Additional details provided by patient:     Does the patient have less than a 3 day supply:  [] Yes  [] No    Would you like a call back once the refill request has been completed: [] Yes [] No    If the office needs to give you a call back, can they leave a voicemail: [] Yes [] No    Talha Harris Rep   12/12/23 10:02 EST

## 2023-12-12 NOTE — TELEPHONE ENCOUNTER
Caller: Bob Biggs Sr.    Relationship: Self    Best call back number: [phone]    What medication are you requesting: TEST STRIPS  ONE TOUCH VERIO     What are your current symptoms:     How long have you been experiencing symptoms:     Have you had these symptoms before:    [] Yes  [] No    Have you been treated for these symptoms before:   [] Yes  [] No    If a prescription is needed, what is your preferred pharmacy and phone number: [facility] #[id number] [address] - [phone] [facility] [phone]      Additional notes:

## 2023-12-14 ENCOUNTER — TELEPHONE (OUTPATIENT)
Dept: INTERNAL MEDICINE | Facility: CLINIC | Age: 68
End: 2023-12-14
Payer: COMMERCIAL

## 2023-12-14 DIAGNOSIS — E11.9 TYPE 2 DIABETES MELLITUS WITHOUT COMPLICATION, WITHOUT LONG-TERM CURRENT USE OF INSULIN: Primary | ICD-10-CM

## 2023-12-14 NOTE — TELEPHONE ENCOUNTER
Caller: Bob Biggs Sr.    Relationship: Self    Best call back number: [phone]     What medication are you requesting: CONCORD BLOOD GLUCOSE MONITOR, TEST STRIPS, AND LANCETS    Have you had these symptoms before:    [x] Yes  [] No    Have you been treated for these symptoms before:   [x] Yes  [] No    If a prescription is needed, what is your preferred pharmacy and phone number: [facility] #[id number] - [place] [id number] [facility] AT [address] [place] [address] - [phone] [facility] [phone]      Additional notes: PATIENT STATES THAT HIS ONE TOUCH SUPPLIES ARE NO LONGER COVERED BY HIS INSURANCE, BUT CONFIRMED THE CONCORD SUPPLIES ARE. REQUESTS PRESCRIPTION FOR NEW MACHINE AND SUPPLIES

## 2023-12-18 RX ORDER — BLOOD-GLUCOSE METER
1 KIT MISCELLANEOUS 2 TIMES DAILY
Qty: 1 EACH | Refills: 0 | Status: SHIPPED | OUTPATIENT
Start: 2023-12-18

## 2023-12-18 RX ORDER — LANCETS 33 GAUGE
1 EACH MISCELLANEOUS 2 TIMES DAILY
Qty: 200 EACH | Refills: 4 | Status: SHIPPED | OUTPATIENT
Start: 2023-12-18

## 2023-12-18 RX ORDER — CARVEDILOL 25 MG/1
TABLET, FILM COATED ORAL
Qty: 200 EACH | Refills: 4 | Status: SHIPPED | OUTPATIENT
Start: 2023-12-18

## 2023-12-18 NOTE — TELEPHONE ENCOUNTER
I don't see brand of Demorest but I found a Contour brand.  See below I added these in to see if this ok.  Please advise.

## 2023-12-21 DIAGNOSIS — E11.9 TYPE 2 DIABETES MELLITUS WITHOUT COMPLICATION, WITHOUT LONG-TERM CURRENT USE OF INSULIN: ICD-10-CM

## 2023-12-21 NOTE — TELEPHONE ENCOUNTER
Caller: Bob Biggs Sr.    Relationship: Self    Best call back number: [phone]    Requested Prescriptions:   Requested Prescriptions     Pending Prescriptions Disp Refills    glucose blood (Contour Test) test strip 200 each 4     Sig: Test twice daily.  Use as instructed.  E11.9    Lancets Micro Thin 33G misc 200 each 4     Sig: Use 1 each 2 (Two) Times a Day. E11.9        Pharmacy where request should be sent: [facility] #[id number] [address] - [phone] [facility] [phone] FX     Last office visit with prescribing clinician: 4/27/2023   Last telemedicine visit with prescribing clinician: Visit date not found   Next office visit with prescribing clinician: 1/8/2024     Additional details provided by patient: PLEASE SEND PRESCRIPTIONS AGAIN. PHARMACY HAS REFUSED TO FILL FOR THIS QUANTITY. PATIENT SPOKE WITH INSURANCE, WAS ADVISED THEY HAVE APPROVED FOR PHARMACY TO FILL, BUT PHARMACY WILL NEED NEW PRESCRIPTIONS    Talha Harris Rep   12/21/23 16:01 EST

## 2023-12-26 DIAGNOSIS — R21 RASH: ICD-10-CM

## 2023-12-26 RX ORDER — BETAMETHASONE DIPROPIONATE 0.5 MG/G
CREAM TOPICAL
Qty: 15 G | Refills: 1 | Status: SHIPPED | OUTPATIENT
Start: 2023-12-26

## 2023-12-26 RX ORDER — LANCETS 33 GAUGE
1 EACH MISCELLANEOUS 2 TIMES DAILY
Qty: 200 EACH | Refills: 4 | Status: SHIPPED | OUTPATIENT
Start: 2023-12-26

## 2023-12-26 RX ORDER — CARVEDILOL 25 MG/1
TABLET, FILM COATED ORAL
Qty: 200 EACH | Refills: 4 | Status: SHIPPED | OUTPATIENT
Start: 2023-12-26

## 2024-01-08 ENCOUNTER — OFFICE VISIT (OUTPATIENT)
Dept: INTERNAL MEDICINE | Facility: CLINIC | Age: 69
End: 2024-01-08
Payer: COMMERCIAL

## 2024-01-08 VITALS
DIASTOLIC BLOOD PRESSURE: 76 MMHG | BODY MASS INDEX: 28.32 KG/M2 | SYSTOLIC BLOOD PRESSURE: 118 MMHG | OXYGEN SATURATION: 97 % | HEART RATE: 89 BPM | WEIGHT: 197.8 LBS | HEIGHT: 70 IN

## 2024-01-08 DIAGNOSIS — E11.9 TYPE 2 DIABETES MELLITUS WITHOUT COMPLICATION, WITHOUT LONG-TERM CURRENT USE OF INSULIN: Primary | Chronic | ICD-10-CM

## 2024-01-08 DIAGNOSIS — E11.9 ENCOUNTER FOR DIABETIC FOOT EXAM: ICD-10-CM

## 2024-01-08 DIAGNOSIS — I10 ESSENTIAL HYPERTENSION: Chronic | ICD-10-CM

## 2024-01-08 PROCEDURE — 99214 OFFICE O/P EST MOD 30 MIN: CPT | Performed by: FAMILY MEDICINE

## 2024-01-08 NOTE — PATIENT INSTRUCTIONS
"MyChart/Telephone call/Lab results Tips:    MyChart and telephone calls are a useful part of our patient care program and is a way for us to communicate lab results and for you to request refills.  Not all medical questions are appropriate for MyChart such as new medical issues that require taking a history, performing an exam, getting labs/studies or researching medical questions needed to be addressed in the office.  Similarly, telephone calls should not be used for new problems requiring an evaluation as well.    Examples of medical issues that are APPROPRIATE for MyChart and telephone calls:  -Follow-up on problems we have already addressed in a visit such as home testing results, blood pressure readings, glucose readings  -Questions that can be answered with a simple \"yes\" or \"no\"    Communication that is NOT APPROPRIATE for MyChart:  -New problems, serious problems or urgent problems.  Urgent matters should be addressed by phone for advice, in the office, urgent care or the ER.  -Requesting Covid or bacterial infection treatments: These types of problems require an evaluation.    -[employer] messages are not email.  Staff will check messages each weekday.  We strive for a 48-hour turnaround on messaging.    -[employer] is not for private issues.  Messages are received first by our office staff.    Please allow up to 7 business days for lab results to be sent through [employer] to you before contacting your provider.  Sometimes we are waiting for results to get back from the lab and also your provider needs time to analyze them thoroughly before making recommendations.  While the internet has great resources, it is not a substitute for interpreting lab results.    We also ask that you not send [employer] messages and telephone calls regarding the same issue simultaneously.  This slows down the process of returning your call/message and confuses staff.    Be mindful that MyChart messages and telephone calls become part of " your permanent medical record.    Your provider cannot access your CDEL[facility].  It is a service which communicates with the EHR (Electronic Health Record).  Sometimes there are errors in [employer] that staff and providers cannot see and these errors are not part of your EHR.  Vaccines and screening reminders have been frequently incorrect in CDEL[facility].    Per Dr. Thomas, when sending messages via [employer], please be as concise and accurate as possible.  Much of our time each day is utilized looking up information for patients only to find out that it was another practice or pharmacy responsible for the issue.  I will be the first to admit that I am not fond of [employer].  Much information is inaccurate or difficult/time consuming to look up in our system.  When using the messaging, please use it for short and simple questions.  Anything further than that should go through the phone system or better yet addressed in a visit.      We appreciate your respect for the limitations and boundaries of [employer] and the telephone answering service.    Thank you,  Dr. Thomas

## 2024-01-08 NOTE — PROGRESS NOTES
Chief Complaint  Diabetes (6 MONTHS F/U)    Subjective        Bob HARTLEY Kalyan Cody. presents to [facility]  History of Present Illness    He is here today to follow-up for diabetes and hypertension.  He is taking his medications as prescribed below.  No new complaints regarding these conditions.  BP controlled in the office today.        Current Outpatient Medications:     acetaminophen (TYLENOL) 650 MG 8 hr tablet, Take 1 tablet by mouth Every 8 (Eight) Hours As Needed for Mild Pain., Disp: , Rfl:     betamethasone, augmented, (DIPROLENE) 0.05 % cream, APPLY TO AFECTED AREA(S) AS DIRECTED, Disp: 15 g, Rfl: 1    Blood Glucose Monitoring Suppl (Contour Blood Glucose System) w/Device kit, Use 1 each 2 (Two) Times a Day. E11.9, Disp: 1 each, Rfl: 0    cetirizine (zyrTEC) 10 MG tablet, Take 1 tablet by mouth Daily., Disp: , Rfl:     cyclobenzaprine (FLEXERIL) 10 MG tablet, Take 1 tablet by mouth Daily As Needed for Muscle Spasms., Disp: 30 tablet, Rfl: 1    glucose blood (Contour Test) test strip, Test twice daily.  Use as instructed.  E11.9, Disp: 200 each, Rfl: 4    glucose blood test strip, CHECK BLOOD SUGAR 2 TIMES PER DAY, Disp: 200 each, Rfl: 3    guaiFENesin (MUCINEX) 600 MG 12 hr tablet, Take 2 tablets by mouth As Needed for Cough., Disp: , Rfl:     ibuprofen (ADVIL,MOTRIN) 800 MG tablet, Take 1 tablet by mouth Every 6 (Six) Hours As Needed., Disp: , Rfl:     Lancets (onetouch ultrasoft) lancets, Check Blood Sugar two times a day E11.9, Disp: 200 each, Rfl: 4    Lancets Micro Thin 33G misc, Use 1 each 2 (Two) Times a Day. E11.9, Disp: 200 each, Rfl: 4    losartan (COZAAR) 50 MG tablet, Take 1 tablet by mouth Daily., Disp: 90 tablet, Rfl: 4    metFORMIN ER (GLUCOPHAGE-XR) 500 MG 24 hr tablet, Take 1 tablet by mouth Daily With Breakfast., Disp: 90 tablet, Rfl: 4    Multiple Vitamin (ONE-A-DAY 55 PLUS PO), Take 1 tablet by mouth Daily., Disp: , Rfl:     pantoprazole (PROTONIX) 40 MG EC  "tablet, Take 1 tablet by mouth 2 (Two) Times a Day., Disp: 180 tablet, Rfl: 1    VALACYCLOVIR HCL PO, Take  by mouth. For herpes outbreaks, Disp: , Rfl:       Objective   Vital Signs:  /76 (BP Location: Left arm, Patient Position: Sitting, Cuff Size: Adult)   Pulse 89   Ht 177.8 cm (70\")   Wt 89.7 kg (197 lb 12.8 oz)   SpO2 97%   BMI 28.38 kg/m²   Estimated body mass index is 28.38 kg/m² as calculated from the following:    Height as of this encounter: 177.8 cm (70\").    Weight as of this encounter: 89.7 kg (197 lb 12.8 oz).             Physical Exam  Vitals and nursing note reviewed.   Constitutional:       General: He is not in acute distress.     Appearance: Normal appearance.   Cardiovascular:      Rate and Rhythm: Normal rate and regular rhythm.      Pulses:           Dorsalis pedis pulses are 2+ on the right side and 2+ on the left side.        Posterior tibial pulses are 2+ on the right side and 2+ on the left side.      Heart sounds: Normal heart sounds. No murmur heard.  Pulmonary:      Effort: Pulmonary effort is normal.      Breath sounds: Normal breath sounds.   Feet:      Right foot:      Protective Sensation: 5 sites tested.  5 sites sensed.      Skin integrity: Skin integrity normal.      Left foot:      Protective Sensation: 5 sites tested.  5 sites sensed.      Skin integrity: Skin integrity normal.   Neurological:      Mental Status: He is alert.        Result Review :  The following data was reviewed by: Dustin Thomas MD on 01/08/2024:  Common labs          4/27/2023    10:19   Common Labs   Glucose 111    BUN 12    Creatinine 0.90    Sodium 139    Potassium 4.5    Chloride 106    Calcium 9.5    Total Protein 7.0    Albumin 4.5    Total Bilirubin 0.6    Alkaline Phosphatase 43    AST (SGOT) 32    ALT (SGPT) 36    WBC 5.96    Hemoglobin 13.6    Hematocrit 41.7    Platelets 204    Total Cholesterol 188    Triglycerides 51    HDL Cholesterol 52    LDL Cholesterol  126    Hemoglobin A1C " 6.10    Microalbumin, Urine 7.2    PSA 1.290                   Assessment and Plan   Diagnoses and all orders for this visit:    1. Type 2 diabetes mellitus without complication, without long-term current use of insulin (Primary)  -     CBC (No Diff)  -     Comprehensive Metabolic Panel  -     Hemoglobin A1c    2. Essential hypertension  -     CBC (No Diff)  -     Comprehensive Metabolic Panel    3. Encounter for diabetic foot exam      Clinically stable chronic conditions as above.  Continue all medications as above.  Labs reviewed/ordered as above.           Follow Up   Return in about 6 months (around 7/9/2024) for Annual physical.  Patient was given instructions and counseling regarding his condition or for health maintenance advice. Please see specific information pulled into the AVS if appropriate.

## 2024-01-09 DIAGNOSIS — E11.9 TYPE 2 DIABETES MELLITUS WITHOUT COMPLICATION, WITHOUT LONG-TERM CURRENT USE OF INSULIN: ICD-10-CM

## 2024-01-09 LAB
ALBUMIN SERPL-MCNC: 4.6 G/DL (ref 3.5–5.2)
ALBUMIN/GLOB SERPL: 1.7 G/DL
ALP SERPL-CCNC: 48 U/L (ref 39–117)
ALT SERPL-CCNC: 28 U/L (ref 1–41)
AST SERPL-CCNC: 21 U/L (ref 1–40)
BILIRUB SERPL-MCNC: 0.8 MG/DL (ref 0–1.2)
BUN SERPL-MCNC: 18 MG/DL (ref 8–23)
BUN/CREAT SERPL: 20.7 (ref 7–25)
CALCIUM SERPL-MCNC: 9.3 MG/DL (ref 8.6–10.5)
CHLORIDE SERPL-SCNC: 105 MMOL/L (ref 98–107)
CO2 SERPL-SCNC: 23 MMOL/L (ref 22–29)
CREAT SERPL-MCNC: 0.87 MG/DL (ref 0.76–1.27)
EGFRCR SERPLBLD CKD-EPI 2021: 93.4 ML/MIN/1.73
ERYTHROCYTE [DISTWIDTH] IN BLOOD BY AUTOMATED COUNT: 13.1 % (ref 12.3–15.4)
GLOBULIN SER CALC-MCNC: 2.7 GM/DL
GLUCOSE SERPL-MCNC: 146 MG/DL (ref 65–99)
HBA1C MFR BLD: 7.6 % (ref 4.8–5.6)
HCT VFR BLD AUTO: 42.7 % (ref 37.5–51)
HGB BLD-MCNC: 14.3 G/DL (ref 13–17.7)
MCH RBC QN AUTO: 30.1 PG (ref 26.6–33)
MCHC RBC AUTO-ENTMCNC: 33.5 G/DL (ref 31.5–35.7)
MCV RBC AUTO: 89.9 FL (ref 79–97)
PLATELET # BLD AUTO: 205 10*3/MM3 (ref 140–450)
POTASSIUM SERPL-SCNC: 4.7 MMOL/L (ref 3.5–5.2)
PROT SERPL-MCNC: 7.3 G/DL (ref 6–8.5)
RBC # BLD AUTO: 4.75 10*6/MM3 (ref 4.14–5.8)
SODIUM SERPL-SCNC: 138 MMOL/L (ref 136–145)
WBC # BLD AUTO: 6.04 10*3/MM3 (ref 3.4–10.8)

## 2024-01-09 RX ORDER — METFORMIN HYDROCHLORIDE 500 MG/1
1000 TABLET, EXTENDED RELEASE ORAL
Qty: 180 TABLET | Refills: 4 | Status: SHIPPED | OUTPATIENT
Start: 2024-01-09

## 2024-02-21 DIAGNOSIS — K21.9 GASTROESOPHAGEAL REFLUX DISEASE WITHOUT ESOPHAGITIS: Chronic | ICD-10-CM

## 2024-02-21 RX ORDER — PANTOPRAZOLE SODIUM 40 MG/1
40 TABLET, DELAYED RELEASE ORAL 2 TIMES DAILY
Qty: 180 TABLET | Refills: 1 | OUTPATIENT
Start: 2024-02-21

## 2024-05-06 ENCOUNTER — TELEPHONE (OUTPATIENT)
Dept: INTERNAL MEDICINE | Facility: CLINIC | Age: 69
End: 2024-05-06
Payer: COMMERCIAL

## 2024-05-06 DIAGNOSIS — B00.9 HERPES: Primary | ICD-10-CM

## 2024-05-06 RX ORDER — VALACYCLOVIR HYDROCHLORIDE 500 MG/1
500 TABLET, FILM COATED ORAL 2 TIMES DAILY
Qty: 6 TABLET | Refills: 0 | Status: SHIPPED | OUTPATIENT
Start: 2024-05-06 | End: 2024-05-09

## 2024-08-07 ENCOUNTER — HOSPITAL ENCOUNTER (OUTPATIENT)
Facility: HOSPITAL | Age: 69
Discharge: HOME | End: 2024-08-07
Payer: COMMERCIAL

## 2024-08-07 ENCOUNTER — OFFICE VISIT (OUTPATIENT)
Dept: INTERNAL MEDICINE | Facility: CLINIC | Age: 69
End: 2024-08-07
Payer: COMMERCIAL

## 2024-08-07 VITALS
SYSTOLIC BLOOD PRESSURE: 140 MMHG | WEIGHT: 197 LBS | DIASTOLIC BLOOD PRESSURE: 90 MMHG | HEART RATE: 78 BPM | BODY MASS INDEX: 28.2 KG/M2 | HEIGHT: 70 IN | RESPIRATION RATE: 18 BRPM | OXYGEN SATURATION: 98 %

## 2024-08-07 DIAGNOSIS — M25.551 ACUTE PAIN OF RIGHT HIP: ICD-10-CM

## 2024-08-07 DIAGNOSIS — E11.9 TYPE 2 DIABETES MELLITUS WITHOUT COMPLICATION, WITHOUT LONG-TERM CURRENT USE OF INSULIN: Chronic | ICD-10-CM

## 2024-08-07 DIAGNOSIS — I10 ESSENTIAL HYPERTENSION: Chronic | ICD-10-CM

## 2024-08-07 DIAGNOSIS — M54.41 ACUTE RIGHT-SIDED BACK PAIN WITH SCIATICA: ICD-10-CM

## 2024-08-07 DIAGNOSIS — Z00.00 ENCOUNTER FOR HEALTH MAINTENANCE EXAMINATION: Primary | ICD-10-CM

## 2024-08-07 DIAGNOSIS — Z12.5 SCREENING FOR PROSTATE CANCER: ICD-10-CM

## 2024-08-07 DIAGNOSIS — B00.9 HERPES: ICD-10-CM

## 2024-08-07 PROCEDURE — 99397 PER PM REEVAL EST PAT 65+ YR: CPT | Performed by: FAMILY MEDICINE

## 2024-08-07 PROCEDURE — 99214 OFFICE O/P EST MOD 30 MIN: CPT | Performed by: FAMILY MEDICINE

## 2024-08-07 PROCEDURE — 73502 X-RAY EXAM HIP UNI 2-3 VIEWS: CPT

## 2024-08-07 PROCEDURE — 72110 X-RAY EXAM L-2 SPINE 4/>VWS: CPT

## 2024-08-07 RX ORDER — METHYLPREDNISOLONE 4 MG/1
TABLET ORAL
Qty: 21 TABLET | Refills: 0 | Status: SHIPPED | OUTPATIENT
Start: 2024-08-07

## 2024-08-07 RX ORDER — VALACYCLOVIR HYDROCHLORIDE 500 MG/1
500 TABLET, FILM COATED ORAL 2 TIMES DAILY
Qty: 6 TABLET | Refills: 0 | Status: SHIPPED | OUTPATIENT
Start: 2024-08-07 | End: 2024-08-10

## 2024-08-07 NOTE — PROGRESS NOTES
"Chief Complaint   Patient presents with    Annual Exam    Back Pain    Groin Pain       Subjective       CPE- Patient is here today for annual physical.  He is having consistent pain down his right leg as well as right side testicular pain.  He lifts heavy things frequently at work.  He has a history of hernia repair right side 2021.  The pains have all been occurring about 1 month.      Labs: Due for routine labs    Colorectal cancer screening: Up-to-date    Vitals:    08/07/24 0918   BP: 140/90   BP Location: Left arm   Patient Position: Sitting   Cuff Size: Small Adult   Pulse: 78   Resp: 18   SpO2: 98%   Weight: 89.4 kg (197 lb)   Height: 177.8 cm (70\")     Body mass index is 28.27 kg/m².           Physical Exam  Vitals and nursing note reviewed.   Constitutional:       General: He is not in acute distress.     Appearance: Normal appearance.   HENT:      Right Ear: Tympanic membrane, ear canal and external ear normal.      Left Ear: Tympanic membrane, ear canal and external ear normal.      Nose: Nose normal.      Mouth/Throat:      Mouth: Mucous membranes are moist.   Eyes:      General:         Right eye: No discharge.         Left eye: No discharge.      Conjunctiva/sclera: Conjunctivae normal.   Cardiovascular:      Rate and Rhythm: Normal rate and regular rhythm.      Pulses: Normal pulses.      Heart sounds: Normal heart sounds.   Pulmonary:      Effort: Pulmonary effort is normal.      Breath sounds: Normal breath sounds.   Abdominal:      General: Bowel sounds are normal. There is no distension.      Palpations: Abdomen is soft.      Tenderness: There is no abdominal tenderness.   Musculoskeletal:      Cervical back: Neck supple.      Lumbar back: Tenderness present. No spasms or bony tenderness.      Right lower leg: No edema.      Left lower leg: No edema.   Lymphadenopathy:      Cervical: No cervical adenopathy.   Skin:     General: Skin is warm.      Findings: No rash.   Neurological:      General: " No focal deficit present.      Mental Status: He is alert. Mental status is at baseline.   Psychiatric:         Mood and Affect: Mood normal.         Behavior: Behavior normal.           Common labs          1/8/2024    10:02   Common Labs   Glucose 146    BUN 18    Creatinine 0.87    Sodium 138    Potassium 4.7    Chloride 105    Calcium 9.3    Total Protein 7.3    Albumin 4.6    Total Bilirubin 0.8    Alkaline Phosphatase 48    AST (SGOT) 21    ALT (SGPT) 28    WBC 6.04    Hemoglobin 14.3    Hematocrit 42.7    Platelets 205    Hemoglobin A1C 7.60            Diagnoses and all orders for this visit:    1. Encounter for health maintenance examination (Primary)    2. Essential hypertension  -     Comprehensive Metabolic Panel  -     Lipid Panel With LDL / HDL Ratio    3. Type 2 diabetes mellitus without complication, without long-term current use of insulin  -     Comprehensive Metabolic Panel  -     Hemoglobin A1c  -     Lipid Panel With LDL / HDL Ratio  -     Microalbumin / Creatinine Urine Ratio - Urine, Clean Catch  -     methylPREDNISolone (MEDROL) 4 MG dose pack; Take as directed on package instructions.  Dispense: 21 tablet; Refill: 0    4. Screening for prostate cancer  -     PSA Screen    5. Acute right-sided back pain with sciatica  -     XR Spine Lumbar Complete 4+VW; Future    6. Acute pain of right hip  -     XR Hip With or Without Pelvis 2 - 3 View Right; Future    7. Herpes  -     valACYclovir (Valtrex) 500 MG tablet; Take 1 tablet by mouth 2 (Two) Times a Day for 3 days.  Dispense: 6 tablet; Refill: 0           The patient was counseled regarding nutrition, physical activity, healthy weight, injury prevention, misuse of tobacco, alcohol and illicit drugs, mental health, immunizations, and screenings.    I will give him a Medrol pack for the acute right sided back pain and hip pain.  I think he has sciatica which seems to fit the best but he also has some symptoms of hip pain so I will get an x-ray of  both.  He is at risk for both issues with his occupation.  This will help me give a better idea of what type of doctor to refer him to.  It seems like the groin pain coincides with the back issues which can also occur if certain nerves are involved.  He denies any evidence of bulge.    Return if symptoms worsen or fail to improve.

## 2024-08-07 NOTE — PROGRESS NOTES
Subjective   The ABCs of the Annual Wellness Visit  Medicare Wellness Visit      Bob Biggs Sr. is a 69 y.o. patient who presents for a Medicare Wellness Visit.    The following portions of the patient's history were reviewed and   updated as appropriate: allergies, current medications, past family history, past medical history, past social history, past surgical history, and problem list.    Compared to one year ago, the patient's physical   health is the same.  Compared to one year ago, the patient's mental   health is the same.    Recent Hospitalizations:  He was not admitted to the hospital during the last year.     Current Medical Providers:  Patient Care Team:  Dustin Thomas MD as PCP - General (Family Medicine)    Outpatient Medications Prior to Visit   Medication Sig Dispense Refill    acetaminophen (TYLENOL) 650 MG 8 hr tablet Take 1 tablet by mouth Every 8 (Eight) Hours As Needed for Mild Pain.      betamethasone, augmented, (DIPROLENE) 0.05 % cream APPLY TO AFECTED AREA(S) AS DIRECTED 15 g 1    Blood Glucose Monitoring Suppl (Contour Blood Glucose System) w/Device kit Use 1 each 2 (Two) Times a Day. E11.9 1 each 0    cetirizine (zyrTEC) 10 MG tablet Take 1 tablet by mouth Daily.      cyclobenzaprine (FLEXERIL) 10 MG tablet Take 1 tablet by mouth Daily As Needed for Muscle Spasms. 30 tablet 1    glucose blood (Contour Test) test strip Test twice daily.  Use as instructed.  E11.9 200 each 4    glucose blood test strip CHECK BLOOD SUGAR 2 TIMES PER  each 3    guaiFENesin (MUCINEX) 600 MG 12 hr tablet Take 2 tablets by mouth As Needed for Cough.      ibuprofen (ADVIL,MOTRIN) 800 MG tablet Take 1 tablet by mouth Every 6 (Six) Hours As Needed.      Lancets (onetouch ultrasoft) lancets Check Blood Sugar two times a day E11.9 200 each 4    Lancets Micro Thin 33G misc Use 1 each 2 (Two) Times a Day. E11.9 200 each 4    losartan (COZAAR) 50 MG tablet Take 1 tablet by mouth Daily. 90 tablet 4     "metFORMIN ER (GLUCOPHAGE-XR) 500 MG 24 hr tablet Take 2 tablets by mouth Daily With Breakfast. 180 tablet 4    Multiple Vitamin (ONE-A-DAY 55 PLUS PO) Take 1 tablet by mouth Daily.      pantoprazole (PROTONIX) 40 MG EC tablet Take 1 tablet by mouth 2 (Two) Times a Day. 180 tablet 1     No facility-administered medications prior to visit.     No opioid medication identified on active medication list. I have reviewed chart for other potential  high risk medication/s and harmful drug interactions in the elderly.      Aspirin is not on active medication list.  Aspirin use is not indicated based on review of current medical condition/s. Risk of harm outweighs potential benefits.  .    Patient Active Problem List   Diagnosis    Osteoarthritis of right knee    Status post total right knee replacement    Type 2 diabetes mellitus without complication, without long-term current use of insulin    Arthritis    Arthritis    GERD (gastroesophageal reflux disease)    Chronic pain of left knee    Osteoarthritis of left knee    Decreased mobility    Screening for malignant neoplasm of colon    Right inguinal hernia    Essential hypertension    Herpes     Advance Care Planning {Advance Care Planning Hyperlink:23}Advance Directive is on file.  ACP discussion was held with the patient during this visit. Patient has an advance directive in EMR which is still valid.             Objective   There were no vitals filed for this visit.    Estimated body mass index is 28.38 kg/m² as calculated from the following:    Height as of 1/8/24: 177.8 cm (70\").    Weight as of 1/8/24: 89.7 kg (197 lb 12.8 oz).            Does the patient have evidence of cognitive impairment? No                                                                                                Health  Risk Assessment    Smoking Status:  Social History     Tobacco Use   Smoking Status Never   Smokeless Tobacco Never     Alcohol Consumption:  Social History     Substance and " Sexual Activity   Alcohol Use No       Fall Risk Screen{Jump to Steadi Fall Risk Flowsheet:23}  STEADI Fall Risk Assessment was completed, and patient is at LOW risk for falls.Assessment completed on:2024    Depression Screenin/8/2024     9:17 AM   PHQ-2/PHQ-9 Depression Screening   Little Interest or Pleasure in Doing Things 0-->not at all   Feeling Down, Depressed or Hopeless 0-->not at all   PHQ-9: Brief Depression Severity Measure Score 0     Health Habits and Functional and Cognitive Screening:       No data to display                    Age-appropriate Screening Schedule:  Refer to the list below for future screening recommendations based on patient's age, sex and/or medical conditions. Orders for these recommended tests are listed in the plan section. The patient has been provided with a written plan.    Health Maintenance List  Health Maintenance   Topic Date Due    DIABETIC EYE EXAM  2023    COVID-19 Vaccine ( season) 04/10/2024    ANNUAL PHYSICAL  2024    URINE MICROALBUMIN  2024    HEMOGLOBIN A1C  2024    INFLUENZA VACCINE  2024    DIABETIC FOOT EXAM  2025    BMI FOLLOWUP  2025    COLORECTAL CANCER SCREENING  06/10/2029    TDAP/TD VACCINES (2 - Td or Tdap) 2030    Pneumococcal Vaccine 65+  Completed    ZOSTER VACCINE  Completed    HEPATITIS C SCREENING  Discontinued                                                                                                                                                CMS Preventative Services Quick Reference  Risk Factors Identified During Encounter  Immunizations Discussed/Encouraged: COVID19    The above risks/problems have been discussed with the patient.  Pertinent information has been shared with the patient in the After Visit Summary.  An After Visit Summary and PPPS were made available to the patient.    Follow Up:{Wrapup  Review (Popup)  Advance Care Planning  Labs  CC  Problem List   Visit Diagnosis  Medications  Result Review  Imaging  [facility]  BestPractice  SmartSets  SnapShot  Encounters  Notes  Media  Procedures :23}   Next Medicare Wellness visit to be scheduled in 1 year.         Additional E&M Note during same encounter follows:  Patient has additional, significant, and separately identifiable condition(s)/problem(s) that require work above and beyond the Medicare Wellness Visit     Chief Complaint  No chief complaint on file.    Subjective {Problem List  Visit Diagnosis   Encounters  Notes  Medications  Labs  Result Review Imaging  Media :23}{Help Text;  If performing a Preventative Medicine Visit (e.g. 08923) in addition to AWV, choose the 1st SmartList option below and document any ROS/PE performed.  If performing a separately identifiable E/M service (e.g. 63026), choose 2nd SmartLink option below. This information in red will not appear in the final note after note is signed:72980}  PAM Rojas is also being seen today for additional medical problem/s.    He is here today to follow-up for diabetes and hypertension. He is taking his medications as prescribed below. No new complaints regarding these conditions. Blood pressure controlled in the office today.       Current Outpatient Medications:     acetaminophen (TYLENOL) 650 MG 8 hr tablet, Take 1 tablet by mouth Every 8 (Eight) Hours As Needed for Mild Pain., Disp: , Rfl:     betamethasone, augmented, (DIPROLENE) 0.05 % cream, APPLY TO AFECTED AREA(S) AS DIRECTED, Disp: 15 g, Rfl: 1    Blood Glucose Monitoring Suppl (Contour Blood Glucose System) w/Device kit, Use 1 each 2 (Two) Times a Day. E11.9, Disp: 1 each, Rfl: 0    cetirizine (zyrTEC) 10 MG tablet, Take 1 tablet by mouth Daily., Disp: , Rfl:     cyclobenzaprine (FLEXERIL) 10 MG tablet, Take 1 tablet by mouth Daily As Needed for Muscle Spasms., Disp: 30 tablet, Rfl: 1    glucose blood (Contour Test) test strip, Test twice daily.  Use  as instructed.  E11.9, Disp: 200 each, Rfl: 4    glucose blood test strip, CHECK BLOOD SUGAR 2 TIMES PER DAY, Disp: 200 each, Rfl: 3    guaiFENesin (MUCINEX) 600 MG 12 hr tablet, Take 2 tablets by mouth As Needed for Cough., Disp: , Rfl:     ibuprofen (ADVIL,MOTRIN) 800 MG tablet, Take 1 tablet by mouth Every 6 (Six) Hours As Needed., Disp: , Rfl:     Lancets (onetouch ultrasoft) lancets, Check Blood Sugar two times a day E11.9, Disp: 200 each, Rfl: 4    Lancets Micro Thin 33G misc, Use 1 each 2 (Two) Times a Day. E11.9, Disp: 200 each, Rfl: 4    losartan (COZAAR) 50 MG tablet, Take 1 tablet by mouth Daily., Disp: 90 tablet, Rfl: 4    metFORMIN ER (GLUCOPHAGE-XR) 500 MG 24 hr tablet, Take 2 tablets by mouth Daily With Breakfast., Disp: 180 tablet, Rfl: 4    Multiple Vitamin (ONE-A-DAY 55 PLUS PO), Take 1 tablet by mouth Daily., Disp: , Rfl:     pantoprazole (PROTONIX) 40 MG EC tablet, Take 1 tablet by mouth 2 (Two) Times a Day., Disp: 180 tablet, Rfl: 1                  Objective   Vital Signs:  There were no vitals taken for this visit.  Physical Exam  Vitals and nursing note reviewed.   Constitutional:       General: He is not in acute distress.     Appearance: Normal appearance.   Cardiovascular:      Rate and Rhythm: Normal rate and regular rhythm.      Heart sounds: Normal heart sounds. No murmur heard.  Pulmonary:      Effort: Pulmonary effort is normal.      Breath sounds: Normal breath sounds.   Neurological:      Mental Status: He is alert.         The following data was reviewed by: Dustin Thomas MD on 08/07/2024:    Common labs          1/8/2024    10:02   Common Labs   Glucose 146    BUN 18    Creatinine 0.87    Sodium 138    Potassium 4.7    Chloride 105    Calcium 9.3    Total Protein 7.3    Albumin 4.6    Total Bilirubin 0.8    Alkaline Phosphatase 48    AST (SGOT) 21    ALT (SGPT) 28    WBC 6.04    Hemoglobin 14.3    Hematocrit 42.7    Platelets 205    Hemoglobin A1C 7.60              Assessment  and Plan {CC Problem List  Visit Diagnosis   ROS  Review (Popup)  Health Maintenance  Quality  BestPractice  Medications  SmartSets  SnapShot Encounters  Media :23}{Help Text; When performing an adult Preventative Medicine Visit (e.g. 73733) using the optional SmartList below can help when documenting any age-appropriate advice given.  When using the SmartList review and update the information based on the unique discussion or advice given to the patient.  As a reminder, diagnosis code Z00.00 (nl exam) or Z00.01 (abnl exam), should be added when this service is performed.  Text will disappear once the note is signed:19066}{Preventative Physical Additional Health Advice List (Optional):5019602144}              Medicare annual wellness visit, subsequent    Essential hypertension  {Hypertension is (optional):6492504327}  Type 2 diabetes mellitus without complication, without long-term current use of insulin  {Diabetes (Optional):2700163415}  Screening for prostate cancer    No orders of the defined types were placed in this encounter.    Clinically stable chronic conditions as above.  Continue all medications as above.  Labs reviewed/ordered as above.            {Time Spent (Optional):10377}  Follow Up {Instructions Charge Capture  Follow-up Communications :23}  No follow-ups on file.  Patient was given instructions and counseling regarding his condition or for health maintenance advice. Please see specific information pulled into the AVS if appropriate.

## 2024-08-07 NOTE — PATIENT INSTRUCTIONS
"MyChart Tips:    [employer] can be a useful part of our patient care program and is a way for us to communicate lab results and for you to request refills.  Here is some information regarding the best way to use [employer] for communication.       Examples of medical issues that are APPROPRIATE for [employer]:  -Follow-up on problems we have already addressed in a visit such as home testing results, blood pressure readings, glucose readings  -Questions that can be answered with a simple \"yes\" or \"no\"  -Please keep communication concise and to the point.  All other types of communication should be held for an office visit.    Communication that is NOT APPROPRIATE for [employer]:  -New problems, serious problems or urgent problems.  Urgent matters should be addressed by phone for advice, in the office, urgent care or the ER.  -Requesting Paxlovid or Antibiotics: These types of problems require an evaluation unless your provider approved this previously.    -[employer] messages are not email.  Staff will check messages each weekday.  We strive for a 48-hour turnaround on messaging.    -[employer] is not for private issues.  Messages are received first by our office staff.    Please be mindful that sometimes it may take longer to receive a response on [employer].  Many times of the year we have high volumes of messages coming into physician inboxes.      Please also be mindful that [employer] messages become part of your permanent medical record.    We appreciate your respect for the limitations and boundaries of [employer].      Lab Results:  Please allow up to 7 business days for lab results to be sent through [employer] to you before contacting your provider.  Sometimes we are waiting for results to get back from the lab and also your provider needs time to analyze them thoroughly before making recommendations.  Also, providers may be out of the office on vacation.      While the internet has great resources, it is not a substitute for " interpreting lab results.

## 2024-08-08 ENCOUNTER — TELEPHONE (OUTPATIENT)
Dept: INTERNAL MEDICINE | Facility: CLINIC | Age: 69
End: 2024-08-08

## 2024-08-08 LAB
ALBUMIN SERPL-MCNC: 4.4 G/DL (ref 3.9–4.9)
ALBUMIN/CREAT UR: 6 MG/G CREAT (ref 0–29)
ALP SERPL-CCNC: 51 IU/L (ref 44–121)
ALT SERPL-CCNC: 32 IU/L (ref 0–44)
AST SERPL-CCNC: 27 IU/L (ref 0–40)
BILIRUB SERPL-MCNC: 0.5 MG/DL (ref 0–1.2)
BUN SERPL-MCNC: 17 MG/DL (ref 8–27)
BUN/CREAT SERPL: 17 (ref 10–24)
CALCIUM SERPL-MCNC: 9.5 MG/DL (ref 8.6–10.2)
CHLORIDE SERPL-SCNC: 104 MMOL/L (ref 96–106)
CHOLEST SERPL-MCNC: 206 MG/DL (ref 100–199)
CO2 SERPL-SCNC: 25 MMOL/L (ref 20–29)
CREAT SERPL-MCNC: 0.99 MG/DL (ref 0.76–1.27)
CREAT UR-MCNC: 175 MG/DL
EGFRCR SERPLBLD CKD-EPI 2021: 82 ML/MIN/1.73
GLOBULIN SER CALC-MCNC: 2.8 G/DL (ref 1.5–4.5)
GLUCOSE SERPL-MCNC: 113 MG/DL (ref 70–99)
HBA1C MFR BLD: 6.9 % (ref 4.8–5.6)
HDLC SERPL-MCNC: 50 MG/DL
LDLC SERPL CALC-MCNC: 145 MG/DL (ref 0–99)
LDLC/HDLC SERPL: 2.9 RATIO (ref 0–3.6)
MICROALBUMIN UR-MCNC: 10.3 UG/ML
POTASSIUM SERPL-SCNC: 5 MMOL/L (ref 3.5–5.2)
PROT SERPL-MCNC: 7.2 G/DL (ref 6–8.5)
PSA SERPL-MCNC: 1.5 NG/ML (ref 0–4)
SODIUM SERPL-SCNC: 139 MMOL/L (ref 134–144)
TRIGL SERPL-MCNC: 63 MG/DL (ref 0–149)
VLDLC SERPL CALC-MCNC: 11 MG/DL (ref 5–40)

## 2024-08-08 NOTE — TELEPHONE ENCOUNTER
LVM for pt to call back to get scheduled in 10 days or so with Alfa Dyson regarding his back and hip pain. HUB CAN READ AND SCHEDULE.

## 2024-08-19 ENCOUNTER — TELEPHONE (OUTPATIENT)
Dept: INTERNAL MEDICINE | Facility: CLINIC | Age: 69
End: 2024-08-19

## 2024-08-19 NOTE — TELEPHONE ENCOUNTER
LVM reason of calling was to ask questions about appt with mavis today seeing that he is leaving this week the pt patient care will be limited so he wanted to know if we could switch him over to a APRN.

## 2024-09-16 DIAGNOSIS — I10 ESSENTIAL HYPERTENSION: ICD-10-CM

## 2024-09-19 RX ORDER — LOSARTAN POTASSIUM 50 MG/1
50 TABLET ORAL DAILY
Qty: 90 TABLET | Refills: 0 | Status: SHIPPED | OUTPATIENT
Start: 2024-09-19

## 2024-10-28 DIAGNOSIS — K21.9 GASTROESOPHAGEAL REFLUX DISEASE WITHOUT ESOPHAGITIS: Chronic | ICD-10-CM

## 2024-10-28 NOTE — TELEPHONE ENCOUNTER
Caller: Bob Biggs Sr.    Relationship: Self    Best call back number: [phone]     Requested Prescriptions:   Requested Prescriptions     Pending Prescriptions Disp Refills    pantoprazole (PROTONIX) 40 MG EC tablet 180 tablet 1     Sig: Take 1 tablet by mouth 2 (Two) Times a Day.        Pharmacy where request should be sent: [facility] #[id number] [place] [id number] [facility] AT [address] [place] [address] - [phone] [facility] [phone]      Last office visit with prescribing clinician: Visit date not found   Last telemedicine visit with prescribing clinician: Visit date not found   Next office visit with prescribing clinician: 2/3/2025     Additional details provided by patient:     Does the patient have less than a 3 day supply:  [] Yes  [x] No    Would you like a call back once the refill request has been completed: [x] Yes [] No    If the office needs to give you a call back, can they leave a voicemail: [x] Yes [] No    Talha Lilly Rep   10/28/24 15:59 EDT

## 2024-11-04 RX ORDER — PANTOPRAZOLE SODIUM 40 MG/1
40 TABLET, DELAYED RELEASE ORAL 2 TIMES DAILY
Qty: 180 TABLET | Refills: 1 | Status: SHIPPED | OUTPATIENT
Start: 2024-11-04

## 2024-11-06 ENCOUNTER — OFFICE VISIT (OUTPATIENT)
Dept: INTERNAL MEDICINE | Facility: CLINIC | Age: 69
End: 2024-11-06
Payer: COMMERCIAL

## 2024-11-06 VITALS
HEART RATE: 76 BPM | DIASTOLIC BLOOD PRESSURE: 79 MMHG | TEMPERATURE: 97 F | HEIGHT: 70 IN | OXYGEN SATURATION: 97 % | WEIGHT: 194.2 LBS | SYSTOLIC BLOOD PRESSURE: 139 MMHG | BODY MASS INDEX: 27.8 KG/M2

## 2024-11-06 DIAGNOSIS — Z00.00 ENCOUNTER FOR MEDICAL EXAMINATION TO ESTABLISH CARE: Primary | ICD-10-CM

## 2024-11-06 DIAGNOSIS — E11.9 TYPE 2 DIABETES MELLITUS WITHOUT COMPLICATION, WITHOUT LONG-TERM CURRENT USE OF INSULIN: ICD-10-CM

## 2024-11-06 DIAGNOSIS — B00.9 HERPES: ICD-10-CM

## 2024-11-06 DIAGNOSIS — I10 ESSENTIAL HYPERTENSION: ICD-10-CM

## 2024-11-06 RX ORDER — LOSARTAN POTASSIUM 50 MG/1
50 TABLET ORAL DAILY
Qty: 90 TABLET | Refills: 3 | Status: SHIPPED | OUTPATIENT
Start: 2024-11-06

## 2024-11-06 RX ORDER — DICLOFENAC SODIUM 75 MG/1
75 TABLET, DELAYED RELEASE ORAL EVERY 12 HOURS PRN
COMMUNITY
Start: 2024-09-07

## 2024-11-06 RX ORDER — METFORMIN HYDROCHLORIDE 500 MG/1
500 TABLET, EXTENDED RELEASE ORAL
Qty: 90 TABLET | Refills: 3 | Status: SHIPPED | OUTPATIENT
Start: 2024-11-06

## 2024-11-06 NOTE — PROGRESS NOTES
"Chief Complaint  Establish Care and Abnormal Chest X-ray    Subjective        Bob Biggs . presents to [facility]  History of Present Illness  69-year-old male presenting with diabetes, hypertension, herpes, abnormal CT and establish care.  Previous patient Dr. Thomas. Has been taking metformin 500 mg daily since August. Checking blood sugar daily and ranging between 120-135.     Taking 50 mg losartan daily. Does not routinely check blood pressure at home but can if needed.     Has been evaluated and treated through work[employer] compensation for a work injury. States it is a hernia/groin strain. Physical therapy has not helped. CT of abdomen and pelvis showed small hiatal hernia, prominent prostate and some renal cysts. About to see a physiatrists on 11/11 for his ongoing pain.     Also has sciatica from mid-back to posterior mid-thigh of right leg. Will discuss with physiatrist as well. MRI may be needed in the future.    Take Valtrex as needed for herpes flare-up.      Objective   Vital Signs:  /79 (BP Location: Left arm, Patient Position: Sitting, Cuff Size: Adult)   Pulse 76   Temp 97 °F (36.1 °C) (Oral)   Ht 177.8 cm (70\")   Wt 88.1 kg (194 lb 3.2 oz)   SpO2 97%   BMI 27.86 kg/m²   Estimated body mass index is 27.86 kg/m² as calculated from the following:    Height as of this encounter: 177.8 cm (70\").    Weight as of this encounter: 88.1 kg (194 lb 3.2 oz).               Physical Exam  Vitals reviewed.   Constitutional:       Appearance: Normal appearance.   Abdominal:       Musculoskeletal:         General: Normal range of motion.      Cervical back: Normal range of motion.        Legs:    Skin:     General: Skin is warm and dry.      Capillary Refill: Capillary refill takes less than 2 seconds.   Neurological:      General: No focal deficit present.      Mental Status: He is alert and oriented to person, place, and time.   Psychiatric:         Mood and Affect: " Mood normal.         Behavior: Behavior normal.         Thought Content: Thought content normal.         Judgment: Judgment normal.        Result Review :      Common labs          1/8/2024    10:02 8/7/2024    10:06   Common Labs   Glucose 146  113    BUN 18  17    Creatinine 0.87  0.99    Sodium 138  139    Potassium 4.7  5.0    Chloride 105  104    Calcium 9.3  9.5    Total Protein 7.3  7.2    Albumin 4.6  4.4    Total Bilirubin 0.8  0.5    Alkaline Phosphatase 48  51    AST (SGOT) 21  27    ALT (SGPT) 28  32    WBC 6.04     Hemoglobin 14.3     Hematocrit 42.7     Platelets 205     Total Cholesterol  206    Triglycerides  63    HDL Cholesterol  50    LDL Cholesterol   145    Hemoglobin A1C 7.60  6.9    Microalbumin, Urine  10.3    PSA  1.5          Current Outpatient Medications on File Prior to Visit   Medication Sig Dispense Refill    acetaminophen (TYLENOL) 650 MG 8 hr tablet Take 1 tablet by mouth Every 8 (Eight) Hours As Needed for Mild Pain.      betamethasone, augmented, (DIPROLENE) 0.05 % cream APPLY TO AFECTED AREA(S) AS DIRECTED 15 g 1    Blood Glucose Monitoring Suppl (Contour Blood Glucose System) w/Device kit Use 1 each 2 (Two) Times a Day. E11.9 1 each 0    cetirizine (zyrTEC) 10 MG tablet Take 1 tablet by mouth Daily.      cyclobenzaprine (FLEXERIL) 10 MG tablet Take 1 tablet by mouth Daily As Needed for Muscle Spasms. 30 tablet 1    diclofenac (VOLTAREN) 75 MG EC tablet Take 1 tablet by mouth Every 12 (Twelve) Hours As Needed. for pain      glucose blood (Contour Test) test strip Test twice daily.  Use as instructed.  E11.9 200 each 4    glucose blood test strip CHECK BLOOD SUGAR 2 TIMES PER  each 3    guaiFENesin (MUCINEX) 600 MG 12 hr tablet Take 2 tablets by mouth As Needed for Cough.      Lancets (onetouch ultrasoft) lancets Check Blood Sugar two times a day E11.9 200 each 4    Lancets Micro Thin 33G misc Use 1 each 2 (Two) Times a Day. E11.9 200 each 4    Multiple Vitamin (ONE-A-DAY 55  PLUS PO) Take 1 tablet by mouth Daily.      pantoprazole (PROTONIX) 40 MG EC tablet Take 1 tablet by mouth 2 (Two) Times a Day. 180 tablet 1    [DISCONTINUED] losartan (COZAAR) 50 MG tablet Take 1 tablet by mouth Daily. 90 tablet 0    [DISCONTINUED] metFORMIN ER (GLUCOPHAGE-XR) 500 MG 24 hr tablet Take 2 tablets by mouth Daily With Breakfast. 180 tablet 4    methylPREDNISolone (MEDROL) 4 MG dose pack Take as directed on package instructions. (Patient not taking: Reported on 11/6/2024) 21 tablet 0     No current facility-administered medications on file prior to visit.                Assessment and Plan     Diagnoses and all orders for this visit:    1. Encounter for medical examination to establish care (Primary)    2. Type 2 diabetes mellitus without complication, without long-term current use of insulin  -     metFORMIN ER (GLUCOPHAGE-XR) 500 MG 24 hr tablet; Take 1 tablet by mouth Daily With Breakfast.  Dispense: 90 tablet; Refill: 3    3. Essential hypertension  -     losartan (COZAAR) 50 MG tablet; Take 1 tablet by mouth Daily.  Dispense: 90 tablet; Refill: 3    4. Herpes        Patient Instructions   Continue medications as prescribed. Contact if MRI of low back is needed. Stay hydrated with water. Decrease metformin to 500 mg daily. Follow-up in 3 months for recheck and labs.            Follow Up     Return in about 3 months (around 2/6/2025) for Recheck.  Patient was given instructions and counseling regarding his condition or for health maintenance advice. Please see specific information pulled into the AVS if appropriate.

## 2024-11-06 NOTE — PATIENT INSTRUCTIONS
Continue medications as prescribed. Contact if MRI of low back is needed. Stay hydrated with water. Decrease metformin to 500 mg daily. Follow-up in 3 months for recheck and labs.

## 2024-12-18 DIAGNOSIS — R21 RASH: ICD-10-CM

## 2024-12-18 NOTE — TELEPHONE ENCOUNTER
Rx Refill Note  Requested Prescriptions     Pending Prescriptions Disp Refills    betamethasone, augmented, (DIPROLENE) 0.05 % cream 15 g 1     Sig: Apply  topically to the appropriate area as directed 2 (Two) Times a Day.      Last office visit with prescribing clinician: 11/6/2024   Last telemedicine visit with prescribing clinician: Visit date not found   Next office visit with prescribing clinician: 2/5/2025                         Would you like a call back once the refill request has been completed: [] Yes [] No    If the office needs to give you a call back, can they leave a voicemail: [] Yes [] No    Martha Dickens  12/18/24, 08:35 EST

## 2024-12-19 RX ORDER — BETAMETHASONE DIPROPIONATE 0.5 MG/G
CREAM TOPICAL 2 TIMES DAILY
Qty: 15 G | Refills: 1 | Status: SHIPPED | OUTPATIENT
Start: 2024-12-19

## 2025-02-05 ENCOUNTER — OFFICE VISIT (OUTPATIENT)
Dept: INTERNAL MEDICINE | Facility: CLINIC | Age: 70
End: 2025-02-05
Payer: COMMERCIAL

## 2025-02-05 VITALS
TEMPERATURE: 97.2 F | BODY MASS INDEX: 27.86 KG/M2 | HEART RATE: 96 BPM | SYSTOLIC BLOOD PRESSURE: 124 MMHG | OXYGEN SATURATION: 98 % | DIASTOLIC BLOOD PRESSURE: 76 MMHG | HEIGHT: 70 IN | WEIGHT: 194.6 LBS

## 2025-02-05 DIAGNOSIS — I10 ESSENTIAL HYPERTENSION: ICD-10-CM

## 2025-02-05 DIAGNOSIS — E11.9 TYPE 2 DIABETES MELLITUS WITHOUT COMPLICATION, WITHOUT LONG-TERM CURRENT USE OF INSULIN: Primary | ICD-10-CM

## 2025-02-05 DIAGNOSIS — K21.9 GASTROESOPHAGEAL REFLUX DISEASE WITHOUT ESOPHAGITIS: Chronic | ICD-10-CM

## 2025-02-05 LAB
ALBUMIN SERPL-MCNC: 4.2 G/DL (ref 3.5–5.2)
ALBUMIN/GLOB SERPL: 1.4 G/DL
ALP SERPL-CCNC: 55 U/L (ref 39–117)
ALT SERPL-CCNC: 22 U/L (ref 1–41)
APPEARANCE UR: CLEAR
AST SERPL-CCNC: 20 U/L (ref 1–40)
BILIRUB SERPL-MCNC: 0.5 MG/DL (ref 0–1.2)
BILIRUB UR QL STRIP: NEGATIVE
BUN SERPL-MCNC: 16 MG/DL (ref 8–23)
BUN/CREAT SERPL: 16.2 (ref 7–25)
CALCIUM SERPL-MCNC: 9.3 MG/DL (ref 8.6–10.5)
CHLORIDE SERPL-SCNC: 104 MMOL/L (ref 98–107)
CHOLEST SERPL-MCNC: 193 MG/DL (ref 0–200)
CHOLEST/HDLC SERPL: 4.6 {RATIO}
CO2 SERPL-SCNC: 23.8 MMOL/L (ref 22–29)
COLOR UR: YELLOW
CREAT SERPL-MCNC: 0.99 MG/DL (ref 0.76–1.27)
EGFRCR SERPLBLD CKD-EPI 2021: 81.9 ML/MIN/1.73
GLOBULIN SER CALC-MCNC: 2.9 GM/DL
GLUCOSE SERPL-MCNC: 120 MG/DL (ref 65–99)
GLUCOSE UR QL STRIP: NEGATIVE
HBA1C MFR BLD: 6.9 % (ref 4.8–5.6)
HDLC SERPL-MCNC: 42 MG/DL (ref 40–60)
HGB UR QL STRIP: NEGATIVE
KETONES UR QL STRIP: ABNORMAL
LDLC SERPL CALC-MCNC: 141 MG/DL (ref 0–100)
LEUKOCYTE ESTERASE UR QL STRIP: NEGATIVE
NITRITE UR QL STRIP: NEGATIVE
PH UR STRIP: 6 [PH] (ref 5–8)
POTASSIUM SERPL-SCNC: 4.7 MMOL/L (ref 3.5–5.2)
PROT SERPL-MCNC: 7.1 G/DL (ref 6–8.5)
PROT UR QL STRIP: NEGATIVE
SODIUM SERPL-SCNC: 137 MMOL/L (ref 136–145)
SP GR UR STRIP: 1.02 (ref 1–1.03)
TRIGL SERPL-MCNC: 52 MG/DL (ref 0–150)
UROBILINOGEN UR STRIP-MCNC: ABNORMAL MG/DL
VLDLC SERPL CALC-MCNC: 10 MG/DL (ref 5–40)

## 2025-02-05 RX ORDER — PANTOPRAZOLE SODIUM 40 MG/1
40 TABLET, DELAYED RELEASE ORAL DAILY
Qty: 90 TABLET | Refills: 1 | Status: SHIPPED | OUTPATIENT
Start: 2025-02-05

## 2025-02-05 NOTE — PROGRESS NOTES
"Chief Complaint  Follow-up    Subjective        Bob Biggs . presents to [facility] PRIMARY [facility]ARE  History of Present Illness  70-year-old male presenting with diabetes and hypertension.  Has worked as a  for the past 8 years.  Taking medications as prescribed.  Tolerating well.  Does not check blood pressure blood sugar regularly.  Is aware of symptoms and feels symptoms if blood pressure or blood sugar are abnormal.  Is able to check if needed.    Has nocturia.  Goes to the restroom about every 3 hours at night.    Follow up checkup, medications  Pertinent negative symptoms include no abdominal pain, no anorexia, no joint pain, no change in stool, no chest pain, no chills, no congestion, no cough, no diaphoresis, no fatigue, no fever, no headaches, no joint swelling, no myalgias, no nausea, no neck pain, no numbness, no rash, no sore throat, no swollen glands, no dysuria, no vertigo, no visual change, no vomiting and no weakness.   Treatment and/or Medications comments include: N/A   Additional information: N/A      Objective   Vital Signs:  /76 (BP Location: Left arm, Patient Position: Sitting, Cuff Size: Adult)   Pulse 96   Temp 97.2 °F (36.2 °C) (Temporal)   Ht 177.8 cm (70\")   Wt 88.3 kg (194 lb 9.6 oz)   SpO2 98%   BMI 27.92 kg/m²   Estimated body mass index is 27.92 kg/m² as calculated from the following:    Height as of this encounter: 177.8 cm (70\").    Weight as of this encounter: 88.3 kg (194 lb 9.6 oz).             Physical Exam  Vitals reviewed.   Constitutional:       Appearance: Normal appearance.   HENT:      Head: Normocephalic.   Cardiovascular:      Rate and Rhythm: Normal rate.      Pulses: Normal pulses.      Heart sounds: Normal heart sounds.   Pulmonary:      Effort: Pulmonary effort is normal.      Breath sounds: Normal breath sounds.   Musculoskeletal:         General: Normal range of motion.      Cervical back: Normal range of motion. "   Skin:     General: Skin is warm and dry.      Capillary Refill: Capillary refill takes less than 2 seconds.   Neurological:      General: No focal deficit present.      Mental Status: He is alert and oriented to person, place, and time.   Psychiatric:         Mood and Affect: Mood normal.         Behavior: Behavior normal.         Thought Content: Thought content normal.         Judgment: Judgment normal.        Result Review :      Common labs          8/7/2024    10:06   Common Labs   Glucose 113    BUN 17    Creatinine 0.99    Sodium 139    Potassium 5.0    Chloride 104    Calcium 9.5    Total Protein 7.2    Albumin 4.4    Total Bilirubin 0.5    Alkaline Phosphatase 51    AST (SGOT) 27    ALT (SGPT) 32    Total Cholesterol 206    Triglycerides 63    HDL Cholesterol 50    LDL Cholesterol  145    Hemoglobin A1C 6.9    Microalbumin, Urine 10.3    PSA 1.5          Current Outpatient Medications on File Prior to Visit   Medication Sig Dispense Refill    acetaminophen (TYLENOL) 650 MG 8 hr tablet Take 1 tablet by mouth Every 8 (Eight) Hours As Needed for Mild Pain.      betamethasone, augmented, (DIPROLENE) 0.05 % cream Apply  topically to the appropriate area as directed 2 (Two) Times a Day. 15 g 1    Blood Glucose Monitoring Suppl (Contour Blood Glucose System) w/Device kit Use 1 each 2 (Two) Times a Day. E11.9 1 each 0    cetirizine (zyrTEC) 10 MG tablet Take 1 tablet by mouth Daily.      cyclobenzaprine (FLEXERIL) 10 MG tablet Take 1 tablet by mouth Daily As Needed for Muscle Spasms. 30 tablet 1    diclofenac (VOLTAREN) 75 MG EC tablet Take 1 tablet by mouth Every 12 (Twelve) Hours As Needed. for pain      glucose blood (Contour Test) test strip Test twice daily.  Use as instructed.  E11.9 200 each 4    glucose blood test strip CHECK BLOOD SUGAR 2 TIMES PER  each 3    guaiFENesin (MUCINEX) 600 MG 12 hr tablet Take 2 tablets by mouth As Needed for Cough.      Lancets (onetouch ultrasoft) lancets Check Blood  Sugar two times a day E11.9 200 each 4    Lancets Micro Thin 33G misc Use 1 each 2 (Two) Times a Day. E11.9 200 each 4    losartan (COZAAR) 50 MG tablet Take 1 tablet by mouth Daily. 90 tablet 3    metFORMIN ER (GLUCOPHAGE-XR) 500 MG 24 hr tablet Take 1 tablet by mouth Daily With Breakfast. 90 tablet 3    Multiple Vitamin (ONE-A-DAY 55 PLUS PO) Take 1 tablet by mouth Daily.      [DISCONTINUED] pantoprazole (PROTONIX) 40 MG EC tablet Take 1 tablet by mouth 2 (Two) Times a Day. 180 tablet 1    methylPREDNISolone (MEDROL) 4 MG dose pack Take as directed on package instructions. (Patient not taking: Reported on 11/6/2024) 21 tablet 0     No current facility-administered medications on file prior to visit.                Assessment and Plan     Diagnoses and all orders for this visit:    1. Type 2 diabetes mellitus without complication, without long-term current use of insulin (Primary)  -     pantoprazole (PROTONIX) 40 MG EC tablet; Take 1 tablet by mouth Daily.  Dispense: 90 tablet; Refill: 1  -     Lipid Panel With / Chol / HDL Ratio  -     Hemoglobin A1c  -     Comprehensive Metabolic Panel  -     Urinalysis With Microscopic If Indicated (No Culture) - Urine, Clean Catch    2. Gastroesophageal reflux disease without esophagitis  -     pantoprazole (PROTONIX) 40 MG EC tablet; Take 1 tablet by mouth Daily.  Dispense: 90 tablet; Refill: 1  -     Lipid Panel With / Chol / HDL Ratio  -     Hemoglobin A1c  -     Comprehensive Metabolic Panel  -     Urinalysis With Microscopic If Indicated (No Culture) - Urine, Clean Catch    3. Essential hypertension  -     pantoprazole (PROTONIX) 40 MG EC tablet; Take 1 tablet by mouth Daily.  Dispense: 90 tablet; Refill: 1  -     Lipid Panel With / Chol / HDL Ratio  -     Hemoglobin A1c  -     Comprehensive Metabolic Panel  -     Urinalysis With Microscopic If Indicated (No Culture) - Urine, Clean Catch        Patient Instructions   Continue medications as prescribed. Consider fish oil  supplement 1000 mg daily. Stay active. Stay hydrated with water. Labs today. Follow-up in 6 months for annual exam and fasting labs.            Follow Up     Return in about 6 months (around 8/5/2025) for Annual physical.  Patient was given instructions and counseling regarding his condition or for health maintenance advice. Please see specific information pulled into the AVS if appropriate.

## 2025-02-05 NOTE — PATIENT INSTRUCTIONS
Continue medications as prescribed. Consider fish oil supplement 1000 mg daily. Stay active. Stay hydrated with water. Labs today. Follow-up in 6 months for annual exam and fasting labs.

## 2025-02-06 NOTE — PROGRESS NOTES
Cholesterol levels have improved very slightly. Recommend limiting intake of red meat, pork, fried foods and high fat dairy products. Limit intake of alcohol. Recommend at least 30 minutes of heart elevating exercises three days a week as tolerated. Recommend adding fish oil supplement of 1000 mg daily to help improve cholesterol levels.    Fasting blood sugar 120 correlating with hemoglobin A1c of 6.9.  Very little change in the past 6 months.  Recommend continuing medications as prescribed.  Limit intake of sweets and carbohydrates.  Will continue to monitor in the future.    Urinalysis is unremarkable.    No signs of kidney injury, liver injury or electrolyte imbalance.

## 2025-02-10 DIAGNOSIS — E08.00 DIABETES MELLITUS DUE TO UNDERLYING CONDITION WITH HYPEROSMOLARITY WITHOUT COMA, WITHOUT LONG-TERM CURRENT USE OF INSULIN: ICD-10-CM

## 2025-02-10 DIAGNOSIS — E11.9 TYPE 2 DIABETES MELLITUS WITHOUT COMPLICATION, WITHOUT LONG-TERM CURRENT USE OF INSULIN: ICD-10-CM

## 2025-02-10 NOTE — TELEPHONE ENCOUNTER
Caller: Bob Biggs Sr.    Relationship: Self    Best call back number: [phone]    Requested Prescriptions:   Requested Prescriptions     Pending Prescriptions Disp Refills    Lancets Micro Thin 33G misc 200 each 4     Sig: Use 1 each 2 (Two) Times a Day. E11.9    glucose blood test strip 200 each 3     Sig: CHECK BLOOD SUGAR 2 TIMES PER DAY        Pharmacy where request should be sent: [facility] #[id number] [address] AT [address] [place] [address] [phone] [facility] [phone] FX       TEST STRIPS MUST BE CONCORD NEXT         Last office visit with prescribing clinician: 2/5/2025   Last telemedicine visit with prescribing clinician: Visit date not found   Next office visit with prescribing clinician: 8/13/2025       Talha Harris Rep   02/10/25 11:59 EST

## 2025-02-13 DIAGNOSIS — E11.9 TYPE 2 DIABETES MELLITUS WITHOUT COMPLICATION, WITHOUT LONG-TERM CURRENT USE OF INSULIN: ICD-10-CM

## 2025-02-13 RX ORDER — LANCETS
EACH MISCELLANEOUS
Qty: 100 EACH | Refills: 0 | Status: SHIPPED | OUTPATIENT
Start: 2025-02-13

## 2025-04-30 ENCOUNTER — TELEPHONE (OUTPATIENT)
Dept: INTERNAL MEDICINE | Facility: CLINIC | Age: 70
End: 2025-04-30
Payer: COMMERCIAL

## 2025-04-30 DIAGNOSIS — B00.9 HERPES: Primary | ICD-10-CM

## 2025-04-30 NOTE — TELEPHONE ENCOUNTER
Caller: Bob Biggs Sr.    Relationship: Self    Best call back number: [phone]    What medication are you requesting: valACYclovir (Valtrex) 500 MG tablet     What are your current symptoms: NONE    If a prescription is needed, what is your preferred pharmacy and phone number: [facility] #[id number] [facility] [address] [facility] AT [address] [place] [address] - [phone] [facility] [phone]      Additional notes: PATIENT STATES THAT HE IS NOT ACTIVELY HAVING A HERPES FLARE UP BUT HE LIKES TO KEEP THIS MEDICATION ON HAND IN CASE HE STARTS TO HAVE A FLARE UP.     PLEASE ADVISE

## 2025-05-02 RX ORDER — VALACYCLOVIR HYDROCHLORIDE 500 MG/1
TABLET, FILM COATED ORAL
Qty: 12 TABLET | Refills: 2 | Status: SHIPPED | OUTPATIENT
Start: 2025-05-02

## 2025-05-09 ENCOUNTER — TELEPHONE (OUTPATIENT)
Dept: INTERNAL MEDICINE | Facility: CLINIC | Age: 70
End: 2025-05-09
Payer: COMMERCIAL

## 2025-05-12 DIAGNOSIS — M54.2 CERVICALGIA: ICD-10-CM

## 2025-05-12 RX ORDER — CYCLOBENZAPRINE HCL 10 MG
10 TABLET ORAL DAILY PRN
Qty: 30 TABLET | Refills: 1 | Status: SHIPPED | OUTPATIENT
Start: 2025-05-12

## 2025-06-02 ENCOUNTER — OFFICE VISIT (OUTPATIENT)
Dept: INTERNAL MEDICINE | Facility: CLINIC | Age: 70
End: 2025-06-02
Payer: COMMERCIAL

## 2025-06-02 VITALS
WEIGHT: 196.4 LBS | HEART RATE: 92 BPM | OXYGEN SATURATION: 98 % | TEMPERATURE: 97.7 F | BODY MASS INDEX: 28.12 KG/M2 | DIASTOLIC BLOOD PRESSURE: 81 MMHG | HEIGHT: 70 IN | SYSTOLIC BLOOD PRESSURE: 145 MMHG

## 2025-06-02 DIAGNOSIS — M54.41 ACUTE RIGHT-SIDED BACK PAIN WITH SCIATICA: Primary | ICD-10-CM

## 2025-06-02 PROCEDURE — 99213 OFFICE O/P EST LOW 20 MIN: CPT

## 2025-06-02 RX ORDER — METHOCARBAMOL 500 MG/1
500 TABLET, FILM COATED ORAL 3 TIMES DAILY PRN
Qty: 30 TABLET | Refills: 2 | Status: SHIPPED | OUTPATIENT
Start: 2025-06-02

## 2025-06-02 RX ORDER — METHYLPREDNISOLONE 4 MG/1
TABLET ORAL
Qty: 21 TABLET | Refills: 0 | Status: SHIPPED | OUTPATIENT
Start: 2025-06-02

## 2025-06-02 NOTE — PATIENT INSTRUCTIONS
Take methocarbamol as needed. Take steroid as prescribed. Stretch. Increase activity. Stay hydrated with water. Follow-up as scheduled.

## 2025-06-02 NOTE — PROGRESS NOTES
"Chief Complaint  Hypertension    Subjective        Bob Biggs Sr. presents to [facility]  History of Present Illness  70-year-old male presenting with right-sided back pain and sciatica.  Went on a 3-hour drive recently.  Drove with a wallet in his back left pocket.  Then started having right leg pain.  Has some shooting pain from his hip down to his right ankle.  Denies any weakness in the leg.  Is still active.  Still has good range of motion.  States that it acts up more if he is sitting for extended periods of time.  Previously tried cyclobenzaprine but makes him too drowsy.  Denies any new trauma or injury to the back.      Objective   Vital Signs:  /81 (BP Location: Right arm, Patient Position: Sitting, Cuff Size: Large Adult)   Pulse 92   Temp 97.7 °F (36.5 °C) (Temporal)   Ht 177.8 cm (70\")   Wt 89.1 kg (196 lb 6.4 oz)   SpO2 98%   BMI 28.18 kg/m²   Estimated body mass index is 28.18 kg/m² as calculated from the following:    Height as of this encounter: 177.8 cm (70\").    Weight as of this encounter: 89.1 kg (196 lb 6.4 oz).             Physical Exam  Vitals reviewed.   Constitutional:       Appearance: Normal appearance.   Musculoskeletal:         General: Tenderness present. Normal range of motion.      Cervical back: Normal range of motion.   Skin:     General: Skin is warm and dry.      Capillary Refill: Capillary refill takes less than 2 seconds.   Neurological:      General: No focal deficit present.      Mental Status: He is alert and oriented to person, place, and time.   Psychiatric:         Mood and Affect: Mood normal.         Behavior: Behavior normal.         Thought Content: Thought content normal.         Judgment: Judgment normal.        Result Review :      Common labs          8/7/2024    10:06 2/5/2025    10:14   Common Labs   Glucose 113  120    BUN 17  16    Creatinine 0.99  0.99    Sodium 139  137    Potassium 5.0  4.7    Chloride 104  " 104    Calcium 9.5  9.3    Albumin 4.4  4.2    Total Bilirubin 0.5  0.5    Alkaline Phosphatase 51  55    AST (SGOT) 27  20    ALT (SGPT) 32  22    Total Cholesterol 206  193    Triglycerides 63  52    HDL Cholesterol 50  42    LDL Cholesterol  145  141    Hemoglobin A1C 6.9  6.90    Microalbumin, Urine 10.3     PSA 1.5           Current Outpatient Medications on File Prior to Visit   Medication Sig Dispense Refill    acetaminophen (TYLENOL) 650 MG 8 hr tablet Take 1 tablet by mouth Every 8 (Eight) Hours As Needed for Mild Pain.      betamethasone, augmented, (DIPROLENE) 0.05 % cream Apply  topically to the appropriate area as directed 2 (Two) Times a Day. 15 g 1    Blood Glucose Monitoring Suppl (Contour Blood Glucose System) w/Device kit Use 1 each 2 (Two) Times a Day. E11.9 1 each 0    cetirizine (zyrTEC) 10 MG tablet Take 1 tablet by mouth Daily.      diclofenac (VOLTAREN) 75 MG EC tablet Take 1 tablet by mouth Every 12 (Twelve) Hours As Needed. for pain      glucose blood (Contour Next Test) test strip Use to test blood sugar Once a Day. 200 each 0    glucose blood test strip CHECK BLOOD SUGAR 2 TIMES PER  each 0    guaiFENesin (MUCINEX) 600 MG 12 hr tablet Take 2 tablets by mouth As Needed for Cough.      Lancets (onetouch ultrasoft) lancets Check Blood Sugar two times a day E11.9 200 each 4    Lancets Micro Thin 33G misc Use 1 each 2 (Two) Times a Day. E11.9 200 each 0    losartan (COZAAR) 50 MG tablet Take 1 tablet by mouth Daily. 90 tablet 3    metFORMIN ER (GLUCOPHAGE-XR) 500 MG 24 hr tablet Take 1 tablet by mouth Daily With Breakfast. 90 tablet 3    Microlet Lancets misc Use to test blood sugar once a day. 100 each 0    Multiple Vitamin (ONE-A-DAY 55 PLUS PO) Take 1 tablet by mouth Daily.      Omega 3-6-9 Fatty Acids (OMEGA 3-6-9 COMPLEX PO)       pantoprazole (PROTONIX) 40 MG EC tablet Take 1 tablet by mouth Daily. 90 tablet 1    valACYclovir (VALTREX) 500 MG tablet Take 1 tablet by mouth twice a  day for three days as needed for flare-up. 12 tablet 2    [DISCONTINUED] cyclobenzaprine (FLEXERIL) 10 MG tablet Take 1 tablet by mouth Daily As Needed for Muscle Spasms. 30 tablet 1    [DISCONTINUED] methylPREDNISolone (MEDROL) 4 MG dose pack Take as directed on package instructions. (Patient not taking: Reported on 6/2/2025) 21 tablet 0     No current facility-administered medications on file prior to visit.                Assessment and Plan     Diagnoses and all orders for this visit:    1. Acute right-sided back pain with sciatica (Primary)  -     methocarbamol (ROBAXIN) 500 MG tablet; Take 1 tablet by mouth 3 (Three) Times a Day As Needed for Muscle Spasms.  Dispense: 30 tablet; Refill: 2  -     methylPREDNISolone (MEDROL) 4 MG dose pack; Take as directed on package instructions.  Dispense: 21 tablet; Refill: 0        Patient Instructions   Take methocarbamol as needed. Take steroid as prescribed. Stretch. Increase activity. Stay hydrated with water. Follow-up as scheduled.            Follow Up     Return for Next scheduled follow up.  Patient was given instructions and counseling regarding his condition or for health maintenance advice. Please see specific information pulled into the AVS if appropriate.

## 2025-08-13 ENCOUNTER — OFFICE VISIT (OUTPATIENT)
Dept: INTERNAL MEDICINE | Facility: CLINIC | Age: 70
End: 2025-08-13
Payer: COMMERCIAL

## 2025-08-13 VITALS
HEIGHT: 70 IN | SYSTOLIC BLOOD PRESSURE: 127 MMHG | BODY MASS INDEX: 27.32 KG/M2 | HEART RATE: 71 BPM | OXYGEN SATURATION: 98 % | WEIGHT: 190.8 LBS | TEMPERATURE: 98 F | DIASTOLIC BLOOD PRESSURE: 75 MMHG

## 2025-08-13 DIAGNOSIS — I10 ESSENTIAL HYPERTENSION: ICD-10-CM

## 2025-08-13 DIAGNOSIS — Z00.00 ANNUAL PHYSICAL EXAM: ICD-10-CM

## 2025-08-13 DIAGNOSIS — E11.9 TYPE 2 DIABETES MELLITUS WITHOUT COMPLICATION, WITHOUT LONG-TERM CURRENT USE OF INSULIN: ICD-10-CM

## 2025-08-13 DIAGNOSIS — Z12.5 SCREENING FOR MALIGNANT NEOPLASM OF PROSTATE: Primary | ICD-10-CM

## 2025-08-13 DIAGNOSIS — M79.89 SWOLLEN FINGER: ICD-10-CM

## 2025-08-13 PROCEDURE — 99397 PER PM REEVAL EST PAT 65+ YR: CPT

## 2025-08-14 LAB
ALBUMIN SERPL-MCNC: 4.4 G/DL (ref 3.9–4.9)
ALBUMIN/CREAT UR: 5 MG/G CREAT (ref 0–29)
ALP SERPL-CCNC: 53 IU/L (ref 44–121)
ALT SERPL-CCNC: 25 IU/L (ref 0–44)
APPEARANCE UR: CLEAR
AST SERPL-CCNC: 30 IU/L (ref 0–40)
BASOPHILS # BLD AUTO: 0.1 X10E3/UL (ref 0–0.2)
BASOPHILS NFR BLD AUTO: 1 %
BILIRUB SERPL-MCNC: 0.7 MG/DL (ref 0–1.2)
BILIRUB UR QL STRIP: NEGATIVE
BUN SERPL-MCNC: 21 MG/DL (ref 8–27)
BUN/CREAT SERPL: 22 (ref 10–24)
CALCIUM SERPL-MCNC: 9.2 MG/DL (ref 8.6–10.2)
CHLORIDE SERPL-SCNC: 105 MMOL/L (ref 96–106)
CHOLEST SERPL-MCNC: 193 MG/DL (ref 100–199)
CHOLEST/HDLC SERPL: 4.3 RATIO (ref 0–5)
CO2 SERPL-SCNC: 20 MMOL/L (ref 20–29)
COLOR UR: YELLOW
CREAT SERPL-MCNC: 0.95 MG/DL (ref 0.76–1.27)
CREAT UR-MCNC: 188 MG/DL
EGFRCR SERPLBLD CKD-EPI 2021: 86 ML/MIN/1.73
EOSINOPHIL # BLD AUTO: 0.1 X10E3/UL (ref 0–0.4)
EOSINOPHIL NFR BLD AUTO: 2 %
ERYTHROCYTE [DISTWIDTH] IN BLOOD BY AUTOMATED COUNT: 13.2 % (ref 11.6–15.4)
GLOBULIN SER CALC-MCNC: 2.7 G/DL (ref 1.5–4.5)
GLUCOSE SERPL-MCNC: 123 MG/DL (ref 70–99)
GLUCOSE UR QL STRIP: NEGATIVE
HBA1C MFR BLD: 6.8 % (ref 4.8–5.6)
HCT VFR BLD AUTO: 42.4 % (ref 37.5–51)
HDLC SERPL-MCNC: 45 MG/DL
HGB BLD-MCNC: 13.7 G/DL (ref 13–17.7)
HGB UR QL STRIP: NEGATIVE
IMM GRANULOCYTES # BLD AUTO: 0 X10E3/UL (ref 0–0.1)
IMM GRANULOCYTES NFR BLD AUTO: 0 %
KETONES UR QL STRIP: ABNORMAL
LDLC SERPL CALC-MCNC: 139 MG/DL (ref 0–99)
LEUKOCYTE ESTERASE UR QL STRIP: NEGATIVE
LYMPHOCYTES # BLD AUTO: 1.7 X10E3/UL (ref 0.7–3.1)
LYMPHOCYTES NFR BLD AUTO: 28 %
MCH RBC QN AUTO: 30.7 PG (ref 26.6–33)
MCHC RBC AUTO-ENTMCNC: 32.3 G/DL (ref 31.5–35.7)
MCV RBC AUTO: 95 FL (ref 79–97)
MICRO URNS: ABNORMAL
MICROALBUMIN UR-MCNC: 9.7 UG/ML
MONOCYTES # BLD AUTO: 0.5 X10E3/UL (ref 0.1–0.9)
MONOCYTES NFR BLD AUTO: 8 %
NEUTROPHILS # BLD AUTO: 3.7 X10E3/UL (ref 1.4–7)
NEUTROPHILS NFR BLD AUTO: 61 %
NITRITE UR QL STRIP: NEGATIVE
PH UR STRIP: 6 [PH] (ref 5–7.5)
PLATELET # BLD AUTO: 202 X10E3/UL (ref 150–450)
POTASSIUM SERPL-SCNC: 4.5 MMOL/L (ref 3.5–5.2)
PROT SERPL-MCNC: 7.1 G/DL (ref 6–8.5)
PROT UR QL STRIP: ABNORMAL
PSA SERPL-MCNC: 1 NG/ML (ref 0–4)
RBC # BLD AUTO: 4.46 X10E6/UL (ref 4.14–5.8)
SODIUM SERPL-SCNC: 140 MMOL/L (ref 134–144)
SP GR UR STRIP: 1.03 (ref 1–1.03)
TRIGL SERPL-MCNC: 48 MG/DL (ref 0–149)
TSH SERPL DL<=0.005 MIU/L-ACNC: 1.48 UIU/ML (ref 0.45–4.5)
URATE SERPL-MCNC: 5.1 MG/DL (ref 3.8–8.4)
UROBILINOGEN UR STRIP-MCNC: 0.2 MG/DL (ref 0.2–1)
VLDLC SERPL CALC-MCNC: 9 MG/DL (ref 5–40)
WBC # BLD AUTO: 6.1 X10E3/UL (ref 3.4–10.8)

## (undated) DEVICE — SUT MNCRYL PLS ANTIB UD 4/0 PS2 18IN

## (undated) DEVICE — SOL ANTISTICK CAUTRY ELECTROLUBE LF

## (undated) DEVICE — GLV SURG BIOGEL LTX PF 8 1/2

## (undated) DEVICE — UNDYED BRAIDED (POLYGLACTIN 910), SYNTHETIC ABSORBABLE SUTURE: Brand: COATED VICRYL

## (undated) DEVICE — SKIN PREP TRAY W/CHG: Brand: MEDLINE INDUSTRIES, INC.

## (undated) DEVICE — DRP C/ARM 41X74IN

## (undated) DEVICE — VIOLET BRAIDED (POLYGLACTIN 910), SYNTHETIC ABSORBABLE SUTURE: Brand: COATED VICRYL

## (undated) DEVICE — 3M™ STERI-STRIP™ COMPOUND BENZOIN TINCTURE 40 BAGS/CARTON 4 CARTONS/CASE C1544: Brand: 3M™ STERI-STRIP™

## (undated) DEVICE — CAST PADDING 3"X4 YD KIT: Brand: CARDINAL HEALTH

## (undated) DEVICE — GLV SURG TRIUMPH CLASSIC PF LTX 7.5 STRL

## (undated) DEVICE — BNDG ELAS ELITE V/CLOSE 4IN 5YD LF STRL

## (undated) DEVICE — NDL HYPO PRECISIONGLIDE REG 25G 1 1/2

## (undated) DEVICE — BNDG ELAS ELITE V/CLOSE 6IN 5YD LF STRL

## (undated) DEVICE — TIP COVER ACCESSORY

## (undated) DEVICE — CANNULA SEAL

## (undated) DEVICE — BLADELESS OBTURATOR: Brand: WECK VISTA

## (undated) DEVICE — DRSNG TELFA PAD NONADH STR 1S 3X8IN

## (undated) DEVICE — TUBING, SUCTION, 1/4" X 10', STRAIGHT: Brand: MEDLINE

## (undated) DEVICE — GLV SURG TRIUMPH CLASSIC PF LTX 8.5 STRL

## (undated) DEVICE — MAT FLR ABSORBENT LG 4FT 10 2.5FT

## (undated) DEVICE — SOL NACL 0.9PCT 1000ML

## (undated) DEVICE — 30977 SEE SHARP - ENHANCED INTRAOPERATIVE LAPAROSCOPE CLEANING & DEFOGGING: Brand: 30977 SEE SHARP - ENHANCED INTRAOPERATIVE LAPAROSCOPE CLEANING & DEFOGGING

## (undated) DEVICE — CANN NASL CO2 TRULINK W/O2 A/

## (undated) DEVICE — BNDG ESMARK 4IN 12FT LF STRL BLU

## (undated) DEVICE — VITAL SIGNS™ ADULT ANESTHESIA BREATHING CIRCUIT: Brand: VITAL SIGNS™

## (undated) DEVICE — SUT VIC 2/0 CT2 27IN J269H

## (undated) DEVICE — KT DRN EVAC WND PVC PCH WTROC RND 10F400

## (undated) DEVICE — DUAL CUT SAGITTAL BLADE

## (undated) DEVICE — SUT VICRYL 1 CT1 27IN  JJ40G

## (undated) DEVICE — PAD,ABDOMINAL,8"X10",ST,LF: Brand: MEDLINE

## (undated) DEVICE — SPNG GZ WOVN 4X4IN 12PLY 10/BX STRL

## (undated) DEVICE — COVER,MAYO STAND,STERILE: Brand: MEDLINE

## (undated) DEVICE — UNDERCAST PADDING: Brand: DEROYAL

## (undated) DEVICE — NDL SPINE 22G 31/2IN BLK

## (undated) DEVICE — DRSNG SURESITE WNDW 4X4.5

## (undated) DEVICE — BNDG ADHS PLSTC 1X3IN LF

## (undated) DEVICE — 3M™ STERI-STRIP™ REINFORCED ADHESIVE SKIN CLOSURES, R1547, 1/2 IN X 4 IN (12 MM X 100 MM), 6 STRIPS/ENVELOPE: Brand: 3M™ STERI-STRIP™

## (undated) DEVICE — DRSNG WND GZ CURAD OIL EMULSION 3X3IN STRL

## (undated) DEVICE — APPL CHLORAPREP W/TINT 26ML ORNG

## (undated) DEVICE — PREP SOL POVIDONE/IODINE BT 4OZ

## (undated) DEVICE — SOL NACL 0.9PCT 100ML SGL

## (undated) DEVICE — ARM DRAPE

## (undated) DEVICE — Device: Brand: DEFENDO AIR/WATER/SUCTION AND BIOPSY VALVE

## (undated) DEVICE — LOU GENERAL ROBOT: Brand: MEDLINE INDUSTRIES, INC.

## (undated) DEVICE — DISPOSABLE TOURNIQUET CUFF SINGLE BLADDER, SINGLE PORT AND QUICK CONNECT CONNECTOR: Brand: COLOR CUFF

## (undated) DEVICE — PK KN TOTL 40

## (undated) DEVICE — COLUMN DRAPE

## (undated) DEVICE — PK ORTHO MINOR 40

## (undated) DEVICE — LAPAROSCOPIC SMOKE FILTRATION SYSTEM: Brand: PALL LAPAROSHIELD® PLUS LAPAROSCOPIC SMOKE FILTRATION SYSTEM

## (undated) DEVICE — SOL ISO/ALC RUB 70PCT 4OZ

## (undated) DEVICE — DEV COND GAS LAP INSUFLOW W/LUER CONN

## (undated) DEVICE — THE TORRENT IRRIGATION SCOPE CONNECTOR IS USED WITH THE TORRENT IRRIGATION TUBING TO PROVIDE IRRIGATION FLUIDS SUCH AS STERILE WATER DURING GASTROINTESTINAL ENDOSCOPIC PROCEDURES WHEN USED IN CONJUNCTION WITH AN IRRIGATION PUMP (OR ELECTROSURGICAL UNIT).: Brand: TORRENT

## (undated) DEVICE — SPLNT PLSTR 10 FAST 4X15IN